# Patient Record
Sex: FEMALE | NOT HISPANIC OR LATINO | Employment: FULL TIME | URBAN - METROPOLITAN AREA
[De-identification: names, ages, dates, MRNs, and addresses within clinical notes are randomized per-mention and may not be internally consistent; named-entity substitution may affect disease eponyms.]

---

## 2023-03-23 DIAGNOSIS — Z79.4 TYPE 2 DIABETES MELLITUS WITH HYPERGLYCEMIA, WITH LONG-TERM CURRENT USE OF INSULIN (MULTI): Primary | ICD-10-CM

## 2023-03-23 DIAGNOSIS — E11.65 TYPE 2 DIABETES MELLITUS WITH HYPERGLYCEMIA, WITH LONG-TERM CURRENT USE OF INSULIN (MULTI): Primary | ICD-10-CM

## 2023-03-23 DIAGNOSIS — I10 PRIMARY HYPERTENSION: ICD-10-CM

## 2023-03-27 PROBLEM — E10.9 DIABETES TYPE 1, CONTROLLED (MULTI): Status: ACTIVE | Noted: 2023-03-27

## 2023-03-27 PROBLEM — M48.02 STENOSIS OF CERVICAL SPINE WITH MYELOPATHY (MULTI): Status: ACTIVE | Noted: 2023-03-27

## 2023-03-27 PROBLEM — B00.9 HSV-1 (HERPES SIMPLEX VIRUS 1) INFECTION: Status: ACTIVE | Noted: 2023-03-27

## 2023-03-27 PROBLEM — S32.000A LUMBAR COMPRESSION FRACTURE (MULTI): Status: ACTIVE | Noted: 2023-03-27

## 2023-03-27 PROBLEM — J40 BRONCHITIS: Status: RESOLVED | Noted: 2023-03-27 | Resolved: 2023-03-27

## 2023-03-27 PROBLEM — B34.9 VIRAL ILLNESS: Status: RESOLVED | Noted: 2023-03-27 | Resolved: 2023-03-27

## 2023-03-27 PROBLEM — E78.00 HIGH CHOLESTEROL: Status: ACTIVE | Noted: 2023-03-27

## 2023-03-27 PROBLEM — H91.92 HEARING LOSS, LEFT: Status: ACTIVE | Noted: 2023-03-27

## 2023-03-27 PROBLEM — U07.1 COVID-19 VIRUS INFECTION: Status: RESOLVED | Noted: 2023-03-27 | Resolved: 2023-03-27

## 2023-03-27 PROBLEM — R12 HEARTBURN: Status: ACTIVE | Noted: 2023-03-27

## 2023-03-27 PROBLEM — L08.9 TOE INFECTION: Status: ACTIVE | Noted: 2023-03-27

## 2023-03-27 PROBLEM — E10.29: Status: ACTIVE | Noted: 2023-03-27

## 2023-03-27 PROBLEM — B02.9 HERPES ZOSTER: Status: ACTIVE | Noted: 2023-03-27

## 2023-03-27 PROBLEM — R80.9: Status: ACTIVE | Noted: 2023-03-27

## 2023-03-27 PROBLEM — M54.14 THORACIC RADICULITIS: Status: ACTIVE | Noted: 2023-03-27

## 2023-03-27 PROBLEM — G99.2 STENOSIS OF CERVICAL SPINE WITH MYELOPATHY (MULTI): Status: ACTIVE | Noted: 2023-03-27

## 2023-03-27 PROBLEM — M54.12 CERVICAL RADICULOPATHY: Status: ACTIVE | Noted: 2023-03-27

## 2023-03-27 PROBLEM — I10 BENIGN ESSENTIAL HYPERTENSION: Status: ACTIVE | Noted: 2023-03-27

## 2023-03-27 PROBLEM — M25.50 ARTHRALGIA OF MULTIPLE SITES: Status: ACTIVE | Noted: 2023-03-27

## 2023-03-27 PROBLEM — M48.062 LUMBAR STENOSIS WITH NEUROGENIC CLAUDICATION: Status: ACTIVE | Noted: 2023-03-27

## 2023-03-27 PROBLEM — M79.7 FIBROMYALGIA: Status: ACTIVE | Noted: 2023-03-27

## 2023-03-27 PROBLEM — H90.3 BILATERAL HIGH FREQUENCY SENSORINEURAL HEARING LOSS: Status: ACTIVE | Noted: 2023-03-27

## 2023-03-27 PROBLEM — Z20.822 CLOSE EXPOSURE TO COVID-19 VIRUS: Status: RESOLVED | Noted: 2023-03-27 | Resolved: 2023-03-27

## 2023-03-27 PROBLEM — R10.11 COLICKY RUQ ABDOMINAL PAIN: Status: ACTIVE | Noted: 2023-03-27

## 2023-03-27 PROBLEM — R07.81 RIB PAIN: Status: ACTIVE | Noted: 2023-03-27

## 2023-03-27 PROBLEM — J06.9 ACUTE URI: Status: RESOLVED | Noted: 2023-03-27 | Resolved: 2023-03-27

## 2023-03-27 PROBLEM — E11.21 MICROALBUMINURIC DIABETIC NEPHROPATHY (MULTI): Status: ACTIVE | Noted: 2023-03-27

## 2023-03-27 PROBLEM — M54.6 THORACIC BACK PAIN: Status: ACTIVE | Noted: 2023-03-27

## 2023-03-27 PROBLEM — I25.10 ARTERIOSCLEROTIC CORONARY ARTERY DISEASE: Status: ACTIVE | Noted: 2023-03-27

## 2023-03-27 PROBLEM — J45.909 REACTIVE AIRWAY DISEASE WITH WHEEZING (HHS-HCC): Status: ACTIVE | Noted: 2023-03-27

## 2023-03-27 PROBLEM — E06.3 HASHIMOTO'S THYROIDITIS: Status: ACTIVE | Noted: 2023-03-27

## 2023-03-27 RX ORDER — ATENOLOL 25 MG/1
25 TABLET ORAL DAILY
Qty: 90 TABLET | Refills: 3 | Status: SHIPPED | OUTPATIENT
Start: 2023-03-27 | End: 2024-01-18 | Stop reason: SDUPTHER

## 2023-03-27 RX ORDER — FLASH GLUCOSE SENSOR
KIT MISCELLANEOUS
COMMUNITY
Start: 2022-10-19 | End: 2023-07-24

## 2023-03-27 RX ORDER — LEVOTHYROXINE SODIUM 50 UG/1
50 TABLET ORAL DAILY
COMMUNITY
End: 2024-04-23 | Stop reason: SDUPTHER

## 2023-03-27 RX ORDER — INSULIN LISPRO 100 [IU]/ML
INJECTION, SOLUTION INTRAVENOUS; SUBCUTANEOUS
Qty: 90 ML | Refills: 3 | Status: SHIPPED | OUTPATIENT
Start: 2023-03-27

## 2023-03-27 RX ORDER — ATENOLOL 25 MG/1
1 TABLET ORAL DAILY
COMMUNITY
Start: 2015-03-03 | End: 2023-03-27 | Stop reason: SDUPTHER

## 2023-03-27 RX ORDER — INSULIN LISPRO 100 [IU]/ML
INJECTION, SOLUTION INTRAVENOUS; SUBCUTANEOUS
COMMUNITY
Start: 2013-07-08 | End: 2023-03-27 | Stop reason: SDUPTHER

## 2023-03-27 RX ORDER — INSULIN GLARGINE-YFGN 100 [IU]/ML
INJECTION, SOLUTION SUBCUTANEOUS
COMMUNITY
Start: 2022-03-02

## 2023-03-27 RX ORDER — DULOXETIN HYDROCHLORIDE 30 MG/1
1 CAPSULE, DELAYED RELEASE ORAL NIGHTLY
COMMUNITY
Start: 2020-06-25 | End: 2023-10-23

## 2023-03-27 RX ORDER — NITROGLYCERIN 0.4 MG/1
TABLET SUBLINGUAL
COMMUNITY
Start: 2014-10-10 | End: 2024-05-23 | Stop reason: SDUPTHER

## 2023-03-27 RX ORDER — ACYCLOVIR 800 MG/1
TABLET ORAL
COMMUNITY
Start: 2023-02-24 | End: 2023-05-08 | Stop reason: SDUPTHER

## 2023-03-27 RX ORDER — INSULIN LISPRO 100 [IU]/ML
INJECTION, SOLUTION INTRAVENOUS; SUBCUTANEOUS
Qty: 45 ML | Refills: 0 | Status: SHIPPED | OUTPATIENT
Start: 2023-03-27 | End: 2023-10-18 | Stop reason: SDUPTHER

## 2023-03-27 RX ORDER — LOVASTATIN 20 MG/1
2 TABLET ORAL DAILY
COMMUNITY
Start: 2019-05-21 | End: 2024-02-05 | Stop reason: SDUPTHER

## 2023-03-27 RX ORDER — ASPIRIN 325 MG
1 TABLET ORAL DAILY
COMMUNITY
Start: 2014-02-21

## 2023-03-27 RX ORDER — LISINOPRIL 20 MG/1
2 TABLET ORAL DAILY
COMMUNITY
Start: 2014-10-29 | End: 2023-10-16

## 2023-03-27 RX ORDER — INSULIN LISPRO 100 [IU]/ML
INJECTION, SOLUTION INTRAVENOUS; SUBCUTANEOUS
COMMUNITY
Start: 2022-08-22 | End: 2023-03-27 | Stop reason: SDUPTHER

## 2023-03-27 NOTE — TELEPHONE ENCOUNTER
Patient called about this , says she only has 3 left     Humalog [facility] also a 3 month supply if you can

## 2023-04-25 ENCOUNTER — TELEPHONE (OUTPATIENT)
Dept: PRIMARY CARE | Facility: CLINIC | Age: 63
End: 2023-04-25

## 2023-04-25 DIAGNOSIS — M25.531 WRIST PAIN, ACUTE, RIGHT: Primary | ICD-10-CM

## 2023-04-25 NOTE — TELEPHONE ENCOUNTER
Patient is hospital employee, she is asking for a wrist xray of the right side below thumb     Please order asap

## 2023-05-03 NOTE — RESULT ENCOUNTER NOTE
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 Could see hand surgeon (Dr. Eddy) for injection, if interested.

## 2023-05-05 ENCOUNTER — TELEPHONE (OUTPATIENT)
Dept: PRIMARY CARE | Facility: CLINIC | Age: 63
End: 2023-05-05

## 2023-05-05 DIAGNOSIS — B02.9 HERPES ZOSTER WITHOUT COMPLICATION: ICD-10-CM

## 2023-05-05 DIAGNOSIS — M54.14 THORACIC RADICULITIS: Primary | ICD-10-CM

## 2023-05-05 NOTE — TELEPHONE ENCOUNTER
Patient called asking for a script of acyclovir for her internal shingles. Ramez Patel    Also looking for results of her wrist xray

## 2023-05-08 RX ORDER — ACYCLOVIR 800 MG/1
800 TABLET ORAL
Qty: 35 TABLET | Refills: 1 | Status: SHIPPED | OUTPATIENT
Start: 2023-05-08 | End: 2023-05-08 | Stop reason: SDUPTHER

## 2023-05-08 RX ORDER — ACYCLOVIR 800 MG/1
800 TABLET ORAL
Qty: 35 TABLET | Refills: 1 | Status: SHIPPED | OUTPATIENT
Start: 2023-05-08 | End: 2023-05-15

## 2023-07-17 ENCOUNTER — TELEPHONE (OUTPATIENT)
Dept: PRIMARY CARE | Facility: CLINIC | Age: 63
End: 2023-07-17

## 2023-07-17 NOTE — TELEPHONE ENCOUNTER
Patient called again, asking for an Xray if that would be easier. If you order the MRI- she wants it to be ordered STAT

## 2023-07-17 NOTE — TELEPHONE ENCOUNTER
Patient thinks she tore her rotator cuff. She states she has limited motion in her left arm. She thinks she injured in around 7/8/2023. Wants to know if she should get an MRI. Pain scale 10.

## 2023-10-18 DIAGNOSIS — E11.65 TYPE 2 DIABETES MELLITUS WITH HYPERGLYCEMIA, WITH LONG-TERM CURRENT USE OF INSULIN (MULTI): ICD-10-CM

## 2023-10-18 DIAGNOSIS — Z79.4 TYPE 2 DIABETES MELLITUS WITH HYPERGLYCEMIA, WITH LONG-TERM CURRENT USE OF INSULIN (MULTI): ICD-10-CM

## 2023-10-18 RX ORDER — INSULIN LISPRO 100 [IU]/ML
INJECTION, SOLUTION INTRAVENOUS; SUBCUTANEOUS
Qty: 45 ML | Refills: 3 | Status: SHIPPED | OUTPATIENT
Start: 2023-10-18 | End: 2024-02-05 | Stop reason: WASHOUT

## 2023-10-23 DIAGNOSIS — M54.12 CERVICAL RADICULOPATHY: Primary | ICD-10-CM

## 2023-10-23 DIAGNOSIS — B00.9 HSV-1 (HERPES SIMPLEX VIRUS 1) INFECTION: ICD-10-CM

## 2023-10-23 RX ORDER — DULOXETIN HYDROCHLORIDE 30 MG/1
30 CAPSULE, DELAYED RELEASE ORAL NIGHTLY
Qty: 90 CAPSULE | Refills: 0 | Status: SHIPPED
Start: 2023-10-23 | End: 2024-05-23 | Stop reason: ALTCHOICE

## 2023-11-01 RX ORDER — ACYCLOVIR 400 MG/1
400 TABLET ORAL 2 TIMES DAILY
Qty: 180 TABLET | Refills: 3 | Status: SHIPPED | OUTPATIENT
Start: 2023-11-01 | End: 2024-10-31

## 2023-11-13 ENCOUNTER — APPOINTMENT (OUTPATIENT)
Dept: ENDOCRINOLOGY | Facility: CLINIC | Age: 63
End: 2023-11-13

## 2023-11-24 DIAGNOSIS — E10.9 CONTROLLED DIABETES MELLITUS TYPE 1 WITHOUT COMPLICATIONS (MULTI): ICD-10-CM

## 2023-11-27 ENCOUNTER — TELEPHONE (OUTPATIENT)
Dept: PRIMARY CARE | Facility: CLINIC | Age: 63
End: 2023-11-27

## 2023-11-27 DIAGNOSIS — E10.9 CONTROLLED DIABETES MELLITUS TYPE 1 WITHOUT COMPLICATIONS (MULTI): ICD-10-CM

## 2023-11-27 RX ORDER — FLASH GLUCOSE SENSOR
KIT MISCELLANEOUS
Qty: 6 EACH | Refills: 3 | OUTPATIENT
Start: 2023-11-27

## 2023-11-27 RX ORDER — FLASH GLUCOSE SENSOR
KIT MISCELLANEOUS
Qty: 6 EACH | Refills: 0 | Status: SHIPPED | OUTPATIENT
Start: 2023-11-27 | End: 2024-01-25 | Stop reason: SDUPTHER

## 2023-11-27 NOTE — TELEPHONE ENCOUNTER
Rx Refill Request Telephone Encounter    Name:  Jeanine GISELLE Ghosh  :  [id number]  Medication Name:  FreeStyle Mando 2         3 months supply    Specific Pharmacy location:  [facility]  **Patients insurance will be changing end of month and would like a 3 month supply sent to Pharmacy

## 2024-01-18 ENCOUNTER — TELEPHONE (OUTPATIENT)
Dept: PRIMARY CARE | Facility: CLINIC | Age: 64
End: 2024-01-18
Payer: COMMERCIAL

## 2024-01-18 DIAGNOSIS — I10 PRIMARY HYPERTENSION: ICD-10-CM

## 2024-01-18 RX ORDER — ATENOLOL 25 MG/1
25 TABLET ORAL DAILY
Qty: 90 TABLET | Refills: 0 | Status: SHIPPED | OUTPATIENT
Start: 2024-01-18 | End: 2024-02-05 | Stop reason: WASHOUT

## 2024-01-18 NOTE — TELEPHONE ENCOUNTER
Med Refill   Atenolol 25mg     [facility] pharmacy     3 month supply

## 2024-01-22 ENCOUNTER — TELEPHONE (OUTPATIENT)
Dept: PRIMARY CARE | Facility: CLINIC | Age: 64
End: 2024-01-22
Payer: COMMERCIAL

## 2024-01-22 DIAGNOSIS — E10.9 CONTROLLED DIABETES MELLITUS TYPE 1 WITHOUT COMPLICATIONS (MULTI): ICD-10-CM

## 2024-01-22 NOTE — TELEPHONE ENCOUNTER
Refill on Free  Style Mando sensor system   90 day supply     #6  to [facility] pharmacy [place]

## 2024-01-25 DIAGNOSIS — E10.9 CONTROLLED DIABETES MELLITUS TYPE 1 WITHOUT COMPLICATIONS (MULTI): ICD-10-CM

## 2024-01-25 RX ORDER — FLASH GLUCOSE SENSOR
KIT MISCELLANEOUS
Qty: 6 EACH | Refills: 3 | Status: SHIPPED | OUTPATIENT
Start: 2024-01-25

## 2024-01-30 ENCOUNTER — TELEPHONE (OUTPATIENT)
Dept: ENDOCRINOLOGY | Facility: CLINIC | Age: 64
End: 2024-01-30
Payer: COMMERCIAL

## 2024-01-30 DIAGNOSIS — E10.9 TYPE 1 DIABETES MELLITUS WITHOUT COMPLICATION (MULTI): Primary | ICD-10-CM

## 2024-02-01 NOTE — TELEPHONE ENCOUNTER
Patient called to check on the status of the request.  She states that she will be out of medication soon and would like it sent in ASAP.

## 2024-02-02 RX ORDER — FLASH GLUCOSE SENSOR
KIT MISCELLANEOUS
Qty: 6 EACH | Refills: 0 | Status: SHIPPED | OUTPATIENT
Start: 2024-02-02 | End: 2024-02-08 | Stop reason: WASHOUT

## 2024-02-05 ENCOUNTER — OFFICE VISIT (OUTPATIENT)
Dept: PRIMARY CARE | Facility: CLINIC | Age: 64
End: 2024-02-05
Payer: COMMERCIAL

## 2024-02-05 ENCOUNTER — LAB (OUTPATIENT)
Dept: LAB | Facility: LAB | Age: 64
End: 2024-02-05
Payer: COMMERCIAL

## 2024-02-05 VITALS
BODY MASS INDEX: 28.52 KG/M2 | DIASTOLIC BLOOD PRESSURE: 80 MMHG | RESPIRATION RATE: 16 BRPM | SYSTOLIC BLOOD PRESSURE: 132 MMHG | WEIGHT: 155 LBS | HEIGHT: 62 IN | OXYGEN SATURATION: 98 % | HEART RATE: 66 BPM

## 2024-02-05 DIAGNOSIS — Z78.0 MENOPAUSE: ICD-10-CM

## 2024-02-05 DIAGNOSIS — G99.2 STENOSIS OF CERVICAL SPINE WITH MYELOPATHY (MULTI): ICD-10-CM

## 2024-02-05 DIAGNOSIS — I10 BENIGN ESSENTIAL HYPERTENSION: ICD-10-CM

## 2024-02-05 DIAGNOSIS — M79.7 FIBROMYALGIA: ICD-10-CM

## 2024-02-05 DIAGNOSIS — N18.31 STAGE 3A CHRONIC KIDNEY DISEASE (MULTI): ICD-10-CM

## 2024-02-05 DIAGNOSIS — Z12.11 COLON CANCER SCREENING: ICD-10-CM

## 2024-02-05 DIAGNOSIS — E10.9 CONTROLLED DIABETES MELLITUS TYPE 1 WITHOUT COMPLICATIONS (MULTI): ICD-10-CM

## 2024-02-05 DIAGNOSIS — E06.3 HASHIMOTO'S THYROIDITIS: ICD-10-CM

## 2024-02-05 DIAGNOSIS — J45.20 MILD INTERMITTENT REACTIVE AIRWAY DISEASE WITH WHEEZING WITHOUT COMPLICATION (HHS-HCC): ICD-10-CM

## 2024-02-05 DIAGNOSIS — Z97.4 HEARING AID WORN: ICD-10-CM

## 2024-02-05 DIAGNOSIS — I10 BENIGN ESSENTIAL HYPERTENSION: Primary | ICD-10-CM

## 2024-02-05 DIAGNOSIS — B00.9 HSV-1 (HERPES SIMPLEX VIRUS 1) INFECTION: ICD-10-CM

## 2024-02-05 DIAGNOSIS — M25.50 ARTHRALGIA OF MULTIPLE SITES: ICD-10-CM

## 2024-02-05 DIAGNOSIS — Z12.31 VISIT FOR SCREENING MAMMOGRAM: ICD-10-CM

## 2024-02-05 DIAGNOSIS — R80.9 TYPE 1 DIABETES MELLITUS WITH MICROALBUMINURIA, WITH LONG-TERM CURRENT USE OF INSULIN (MULTI): ICD-10-CM

## 2024-02-05 DIAGNOSIS — E11.21 MICROALBUMINURIC DIABETIC NEPHROPATHY (MULTI): ICD-10-CM

## 2024-02-05 DIAGNOSIS — M48.02 STENOSIS OF CERVICAL SPINE WITH MYELOPATHY (MULTI): ICD-10-CM

## 2024-02-05 DIAGNOSIS — E78.2 MIXED HYPERLIPIDEMIA: ICD-10-CM

## 2024-02-05 DIAGNOSIS — Z00.00 ANNUAL PHYSICAL EXAM: ICD-10-CM

## 2024-02-05 DIAGNOSIS — E10.29 TYPE 1 DIABETES MELLITUS WITH MICROALBUMINURIA, WITH LONG-TERM CURRENT USE OF INSULIN (MULTI): ICD-10-CM

## 2024-02-05 LAB
ALBUMIN SERPL BCP-MCNC: 3.6 G/DL (ref 3.4–5)
ALP SERPL-CCNC: 62 U/L (ref 33–136)
ALT SERPL W P-5'-P-CCNC: 22 U/L (ref 7–45)
ANION GAP SERPL CALC-SCNC: 10 MMOL/L (ref 10–20)
AST SERPL W P-5'-P-CCNC: 22 U/L (ref 9–39)
BASOPHILS # BLD AUTO: 0.05 X10*3/UL (ref 0–0.1)
BASOPHILS NFR BLD AUTO: 0.8 %
BILIRUB SERPL-MCNC: 0.5 MG/DL (ref 0–1.2)
BUN SERPL-MCNC: 25 MG/DL (ref 6–23)
CALCIUM SERPL-MCNC: 8.7 MG/DL (ref 8.6–10.3)
CHLORIDE SERPL-SCNC: 104 MMOL/L (ref 98–107)
CHOLEST SERPL-MCNC: 177 MG/DL (ref 0–199)
CHOLESTEROL/HDL RATIO: 2.6
CO2 SERPL-SCNC: 30 MMOL/L (ref 21–32)
CREAT SERPL-MCNC: 1.26 MG/DL (ref 0.5–1.05)
CREAT UR-MCNC: 102.3 MG/DL (ref 20–320)
EGFRCR SERPLBLD CKD-EPI 2021: 48 ML/MIN/1.73M*2
EOSINOPHIL # BLD AUTO: 0.36 X10*3/UL (ref 0–0.7)
EOSINOPHIL NFR BLD AUTO: 5.5 %
ERYTHROCYTE [DISTWIDTH] IN BLOOD BY AUTOMATED COUNT: 12.9 % (ref 11.5–14.5)
EST. AVERAGE GLUCOSE BLD GHB EST-MCNC: 214 MG/DL
GLUCOSE SERPL-MCNC: 214 MG/DL (ref 74–99)
HBA1C MFR BLD: 9.1 %
HCT VFR BLD AUTO: 42 % (ref 36–46)
HDLC SERPL-MCNC: 68.4 MG/DL
HGB BLD-MCNC: 13.6 G/DL (ref 12–16)
IMM GRANULOCYTES # BLD AUTO: 0.02 X10*3/UL (ref 0–0.7)
IMM GRANULOCYTES NFR BLD AUTO: 0.3 % (ref 0–0.9)
LDLC SERPL CALC-MCNC: 93 MG/DL
LYMPHOCYTES # BLD AUTO: 1.79 X10*3/UL (ref 1.2–4.8)
LYMPHOCYTES NFR BLD AUTO: 27.2 %
MCH RBC QN AUTO: 32.4 PG (ref 26–34)
MCHC RBC AUTO-ENTMCNC: 32.4 G/DL (ref 32–36)
MCV RBC AUTO: 100 FL (ref 80–100)
MICROALBUMIN UR-MCNC: 844 MG/L
MICROALBUMIN/CREAT UR: 825 UG/MG CREAT
MONOCYTES # BLD AUTO: 0.64 X10*3/UL (ref 0.1–1)
MONOCYTES NFR BLD AUTO: 9.7 %
NEUTROPHILS # BLD AUTO: 3.73 X10*3/UL (ref 1.2–7.7)
NEUTROPHILS NFR BLD AUTO: 56.5 %
NON HDL CHOLESTEROL: 109 MG/DL (ref 0–149)
NRBC BLD-RTO: 0 /100 WBCS (ref 0–0)
PLATELET # BLD AUTO: 315 X10*3/UL (ref 150–450)
POTASSIUM SERPL-SCNC: 5 MMOL/L (ref 3.5–5.3)
PROT SERPL-MCNC: 6.1 G/DL (ref 6.4–8.2)
RBC # BLD AUTO: 4.2 X10*6/UL (ref 4–5.2)
SODIUM SERPL-SCNC: 139 MMOL/L (ref 136–145)
T4 FREE SERPL-MCNC: 0.94 NG/DL (ref 0.61–1.12)
TRIGL SERPL-MCNC: 78 MG/DL (ref 0–149)
TSH SERPL-ACNC: 5.31 MIU/L (ref 0.44–3.98)
VLDL: 16 MG/DL (ref 0–40)
WBC # BLD AUTO: 6.6 X10*3/UL (ref 4.4–11.3)

## 2024-02-05 PROCEDURE — 36415 COLL VENOUS BLD VENIPUNCTURE: CPT

## 2024-02-05 PROCEDURE — 83036 HEMOGLOBIN GLYCOSYLATED A1C: CPT

## 2024-02-05 PROCEDURE — 3075F SYST BP GE 130 - 139MM HG: CPT | Performed by: FAMILY MEDICINE

## 2024-02-05 PROCEDURE — 85025 COMPLETE CBC W/AUTO DIFF WBC: CPT

## 2024-02-05 PROCEDURE — 84439 ASSAY OF FREE THYROXINE: CPT

## 2024-02-05 PROCEDURE — 1036F TOBACCO NON-USER: CPT | Performed by: FAMILY MEDICINE

## 2024-02-05 PROCEDURE — 99396 PREV VISIT EST AGE 40-64: CPT | Performed by: FAMILY MEDICINE

## 2024-02-05 PROCEDURE — 80053 COMPREHEN METABOLIC PANEL: CPT

## 2024-02-05 PROCEDURE — 82570 ASSAY OF URINE CREATININE: CPT

## 2024-02-05 PROCEDURE — 4010F ACE/ARB THERAPY RXD/TAKEN: CPT | Performed by: FAMILY MEDICINE

## 2024-02-05 PROCEDURE — 82043 UR ALBUMIN QUANTITATIVE: CPT

## 2024-02-05 PROCEDURE — 3066F NEPHROPATHY DOC TX: CPT | Performed by: FAMILY MEDICINE

## 2024-02-05 PROCEDURE — 3079F DIAST BP 80-89 MM HG: CPT | Performed by: FAMILY MEDICINE

## 2024-02-05 PROCEDURE — 80061 LIPID PANEL: CPT

## 2024-02-05 PROCEDURE — 84443 ASSAY THYROID STIM HORMONE: CPT

## 2024-02-05 RX ORDER — EZETIMIBE 10 MG/1
10 TABLET ORAL DAILY
Qty: 90 TABLET | Refills: 3 | Status: SHIPPED | OUTPATIENT
Start: 2024-02-05 | End: 2025-02-04

## 2024-02-05 ASSESSMENT — PATIENT HEALTH QUESTIONNAIRE - PHQ9
1. LITTLE INTEREST OR PLEASURE IN DOING THINGS: NOT AT ALL
2. FEELING DOWN, DEPRESSED OR HOPELESS: NOT AT ALL
SUM OF ALL RESPONSES TO PHQ9 QUESTIONS 1 AND 2: 0

## 2024-02-05 ASSESSMENT — ENCOUNTER SYMPTOMS
OCCASIONAL FEELINGS OF UNSTEADINESS: 0
DEPRESSION: 0
LOSS OF SENSATION IN FEET: 0

## 2024-02-05 ASSESSMENT — COLUMBIA-SUICIDE SEVERITY RATING SCALE - C-SSRS
2. HAVE YOU ACTUALLY HAD ANY THOUGHTS OF KILLING YOURSELF?: NO
1. IN THE PAST MONTH, HAVE YOU WISHED YOU WERE DEAD OR WISHED YOU COULD GO TO SLEEP AND NOT WAKE UP?: NO
6. HAVE YOU EVER DONE ANYTHING, STARTED TO DO ANYTHING, OR PREPARED TO DO ANYTHING TO END YOUR LIFE?: NO

## 2024-02-05 NOTE — PROGRESS NOTES
Subjective   Jeanine Ghosh is a 63 y.o. female and is here for a comprehensive physical exam. The patient reports no problems. She plans to see Dr. Bagley this week, was on Semglee and Lispro.   Last physical:   Changes no  Concerns no  Dentist no  Vision yes  Hearing Problems yes, wears hearing aids.   Diet yes  Exercise no  Alcohol no  Drugs no  Social History     Tobacco Use   Smoking Status Not on file   Smokeless Tobacco Not on file          Do you take any herbs or supplements that were not prescribed by a doctor? yes Vitamin D, Magnesium   Are you taking calcium supplements? no  Are you taking aspirin daily? yes 325      History:  LMP: No LMP recorded.  Menopause at 55 years  Last pap date: unsure  Abnormal pap? no  : 2  Para: 2    Do you have pain that bothers you in your daily life? yes Peripheral neuropathy.   Review of Systems   All other systems reviewed and are negative.     Weight up ten pounds   Objective   Physical Exam  Vitals reviewed.   Constitutional:       Appearance: Normal appearance.   HENT:      Head: Normocephalic and atraumatic.      Right Ear: Tympanic membrane normal.      Left Ear: Tympanic membrane normal.      Nose: Nose normal.      Mouth/Throat:      Mouth: Mucous membranes are moist.      Pharynx: Oropharynx is clear.   Eyes:      Extraocular Movements: Extraocular movements intact.      Conjunctiva/sclera: Conjunctivae normal.      Pupils: Pupils are equal, round, and reactive to light.   Cardiovascular:      Rate and Rhythm: Normal rate and regular rhythm.      Pulses: Normal pulses.      Heart sounds: Normal heart sounds.   Pulmonary:      Effort: Pulmonary effort is normal.      Breath sounds: Normal breath sounds.   Abdominal:      General: Abdomen is flat. Bowel sounds are normal.      Palpations: Abdomen is soft.   Musculoskeletal:         General: Normal range of motion.      Cervical back: Normal range of motion and neck supple.   Skin:     General: Skin is  warm and dry.      Capillary Refill: Capillary refill takes less than 2 seconds.   Neurological:      General: No focal deficit present.      Mental Status: She is alert and oriented to person, place, and time.   Psychiatric:         Mood and Affect: Mood normal.         Behavior: Behavior normal.         Assessment/Plan   Healthy female exam. She is to see endocrinology soon and will adjust insulin.      1. Needs Pap, Colonoscopy, DEXA, mammogram  2. Patient Counseling:  --Nutrition: Stressed importance of moderation in sodium/caffeine intake, saturated fat and cholesterol, caloric balance, sufficient intake of fresh fruits, vegetables, fiber, calcium, iron, and 1 mg of folate supplement per day (for females capable of pregnancy).  --Discussed the issue of estrogen replacement, calcium supplement, and the daily use of baby aspirin.  --Exercise: Stressed the importance of regular exercise.   --Substance Abuse: Discussed cessation/primary prevention of tobacco, alcohol, or other drug use; driving or other dangerous activities under the influence; availability of treatment for abuse.    --Sexuality: Discussed sexually transmitted diseases, partner selection, use of condoms, avoidance of unintended pregnancy  and contraceptive alternatives.   --Injury prevention: Discussed safety belts, safety helmets, smoke detector, smoking near bedding or upholstery.   --Dental health: Discussed importance of regular tooth brushing, flossing, and dental visits.  --Immunizations reviewed.  --Discussed benefits of screening colonoscopy.  --After hours service discussed with patient  3. Discussed the patient's BMI with her.  The BMI is above average. The patient received discussion because they have an above normal BMI.  4. Follow up in one year

## 2024-02-05 NOTE — PATIENT INSTRUCTIONS
Assessment/Plan   Healthy female exam. She is to see endocrinology soon and will adjust insulin.      1. Needs Pap, Colonoscopy, DEXA, mammogram  2. Patient Counseling:  --Nutrition: Stressed importance of moderation in sodium/caffeine intake, saturated fat and cholesterol, caloric balance, sufficient intake of fresh fruits, vegetables, fiber, calcium, iron, and 1 mg of folate supplement per day (for females capable of pregnancy).  --Discussed the issue of estrogen replacement, calcium supplement, and the daily use of baby aspirin.  --Exercise: Stressed the importance of regular exercise.   --Substance Abuse: Discussed cessation/primary prevention of tobacco, alcohol, or other drug use; driving or other dangerous activities under the influence; availability of treatment for abuse.    --Sexuality: Discussed sexually transmitted diseases, partner selection, use of condoms, avoidance of unintended pregnancy  and contraceptive alternatives.   --Injury prevention: Discussed safety belts, safety helmets, smoke detector, smoking near bedding or upholstery.   --Dental health: Discussed importance of regular tooth brushing, flossing, and dental visits.  --Immunizations reviewed.  --Discussed benefits of screening colonoscopy.  --After hours service discussed with patient  3. Discussed the patient's BMI with her.  The BMI is above average. The patient received discussion because they have an above normal BMI.  4. Follow up in one year    I ordered labwork, colonoscopy, mammogram, DEXA, and would schedule pap with gynecology.

## 2024-02-06 RX ORDER — INSULIN GLARGINE 100 [IU]/ML
INJECTION, SOLUTION SUBCUTANEOUS
Qty: 15 EACH | Refills: 2 | Status: SHIPPED | OUTPATIENT
Start: 2024-02-06 | End: 2024-02-07 | Stop reason: SDUPTHER

## 2024-02-07 DIAGNOSIS — E10.9 TYPE 1 DIABETES MELLITUS WITHOUT COMPLICATION (MULTI): ICD-10-CM

## 2024-02-07 RX ORDER — INSULIN GLARGINE 100 [IU]/ML
INJECTION, SOLUTION SUBCUTANEOUS
Qty: 15 EACH | Refills: 2 | Status: SHIPPED | OUTPATIENT
Start: 2024-02-07

## 2024-02-07 NOTE — RESULT ENCOUNTER NOTE
Urine albumin was very high meaning your kidneys aren't filtering protein that well. We could increase your lisinopril to help or refer to nephrology for more testing, lso would like to increase thyroid to 75, as you still are low! Let me know!

## 2024-02-08 ENCOUNTER — OFFICE VISIT (OUTPATIENT)
Dept: ENDOCRINOLOGY | Facility: CLINIC | Age: 64
End: 2024-02-08
Payer: COMMERCIAL

## 2024-02-08 VITALS
HEART RATE: 60 BPM | SYSTOLIC BLOOD PRESSURE: 142 MMHG | HEIGHT: 62 IN | DIASTOLIC BLOOD PRESSURE: 68 MMHG | BODY MASS INDEX: 28.16 KG/M2 | WEIGHT: 153 LBS

## 2024-02-08 DIAGNOSIS — E06.3 HASHIMOTO'S THYROIDITIS: Primary | ICD-10-CM

## 2024-02-08 DIAGNOSIS — E10.29 TYPE 1 DIABETES MELLITUS WITH MICROALBUMINURIA, WITH LONG-TERM CURRENT USE OF INSULIN (MULTI): ICD-10-CM

## 2024-02-08 DIAGNOSIS — R80.9 TYPE 1 DIABETES MELLITUS WITH MICROALBUMINURIA, WITH LONG-TERM CURRENT USE OF INSULIN (MULTI): ICD-10-CM

## 2024-02-08 PROCEDURE — 95251 CONT GLUC MNTR ANALYSIS I&R: CPT | Performed by: INTERNAL MEDICINE

## 2024-02-08 PROCEDURE — 3077F SYST BP >= 140 MM HG: CPT | Performed by: INTERNAL MEDICINE

## 2024-02-08 PROCEDURE — 3048F LDL-C <100 MG/DL: CPT | Performed by: INTERNAL MEDICINE

## 2024-02-08 PROCEDURE — 1036F TOBACCO NON-USER: CPT | Performed by: INTERNAL MEDICINE

## 2024-02-08 PROCEDURE — 3078F DIAST BP <80 MM HG: CPT | Performed by: INTERNAL MEDICINE

## 2024-02-08 PROCEDURE — 3062F POS MACROALBUMINURIA REV: CPT | Performed by: INTERNAL MEDICINE

## 2024-02-08 PROCEDURE — 99214 OFFICE O/P EST MOD 30 MIN: CPT | Performed by: INTERNAL MEDICINE

## 2024-02-08 PROCEDURE — 4010F ACE/ARB THERAPY RXD/TAKEN: CPT | Performed by: INTERNAL MEDICINE

## 2024-02-08 PROCEDURE — 3046F HEMOGLOBIN A1C LEVEL >9.0%: CPT | Performed by: INTERNAL MEDICINE

## 2024-02-08 RX ORDER — NITROGLYCERIN 0.4 MG/1
TABLET SUBLINGUAL
Qty: 90 TABLET | Status: CANCELLED | OUTPATIENT
Start: 2024-02-08

## 2024-02-08 NOTE — PATIENT INSTRUCTIONS
Thank you for choosing [facility]  for your health care needs.  If you have any questions, concerns or medical needs, please feel free to contact our office at [phone].    Please ensure you complete your blood work one week before the next scheduled appointment.    To continue Basaglar 12 units subcutaneous daily in the morning   To change Humalog 1 unit for every 13 grams of carb; will switch to Insulin Aspart   Please continue an insulin sliding scale as follows:   150-200mg/dL - 2 units   201-250mg/dL - 3 units   251-300 mg/dL - 4 untis   301-350mg/dL - 5 units   >351mg/dL - 6 units  To continue the use of your CGM for the intensive glucose monitoring due to the dynamic nature of insulin requirements, the narrow therapeutic index of insulin and the potentially fatal consequences of treatment  Record all insulin doses in the [facility]   Use Calorie Johnathan lori for carb counts   Counseled that the time in range should be above 70%  To restart Levothyroxine 50mcg po daily   Take levothyroxine on an empty stomach with water alone, 30-60 minutes before eating or taking other medications, 4 hours before any calcium or iron supplement.  See what the  cost of Synthroid will be   Take levothyroxine on an empty stomach with water alone, 30-60 minutes before eating or taking other medications, 4 hours before any calcium or iron supplement.  For follow up in 4 months

## 2024-02-08 NOTE — PROGRESS NOTES
"Subjective   Jeanine Ghosh is a 63 y.o. female who presents for a follow-up evaluation of Type 1 diabetes mellitus. The initial diagnosis of diabetes was made in 1969 .     Since her last appointment,  Lovastatin was discontinued.  She is now on Zetia.      She stopped levothyroxine as it made her fatigued    Known complications due to diabetes included   CAD s/p 2v CABG in 1997 and chronic kidney disease    Cardiovascular risk factors include diabetes mellitus and microalbuminuria. The patient is on an ACE inhibitor or angiotensin II receptor blocker.  The patient has not been previously hospitalized due to diabetic ketoacidosis.     Current symptoms/problems include none. Her clinical course has been stable.     The patient is currently checking the blood glucose multiple times per day.    Patient is using: continuous glucose monitor                                                                  Hypoglycemia frequency: 2%  Hypoglycemia awareness: Yes      Current Medication Regimen  Semglee 12 units SQ daily in the morning   Humalog 1:15g as she is afriad of hypoglycemia     MEALS:   Breakfast - toast with jelly   Lunch - soup, cereal, hamburger   Dinner - meat, vegetables  Snacks - cheetos, fruit, oatmeal with raisins and brown sugars   Beverages- seltzer water, coke zero, tea, coffee     Denies exercise regimen     Objective   /68 (BP Location: Right arm, Patient Position: Sitting, BP Cuff Size: Adult)   Pulse 60   Ht 1.575 m (5' 2\")   Wt 69.4 kg (153 lb)   BMI 27.98 kg/m²   Physical Exam  Vitals and nursing note reviewed.   Constitutional:       General: She is not in acute distress.     Appearance: Normal appearance. She is normal weight.   HENT:      Head: Normocephalic and atraumatic.      Nose: Nose normal.      Mouth/Throat:      Mouth: Mucous membranes are moist.   Eyes:      Extraocular Movements: Extraocular movements intact.   Pulmonary:      Effort: Pulmonary effort is normal. "   Musculoskeletal:         General: Normal range of motion.   Neurological:      Mental Status: She is alert and oriented to person, place, and time.   Psychiatric:         Mood and Affect: Mood normal.         Lab Review  Lab Results   Component Value Date    HGBA1C 9.1 (H) 02/05/2024     Lab Results   Component Value Date    GLUCOSE 214 (H) 02/05/2024    CALCIUM 8.7 02/05/2024     02/05/2024    K 5.0 02/05/2024    CO2 30 02/05/2024     02/05/2024    BUN 25 (H) 02/05/2024    CREATININE 1.26 (H) 02/05/2024     Lab Results   Component Value Date    CHOL 177 02/05/2024    CHOL 188 07/08/2022    CHOL 201 (H) 12/30/2021     Lab Results   Component Value Date    HDL 68.4 02/05/2024    HDL 71.1 07/08/2022    HDL 78.0 12/30/2021     Lab Results   Component Value Date    LDLCALC 93 02/05/2024     Lab Results   Component Value Date    TRIG 78 02/05/2024    TRIG 77 07/08/2022    TRIG 74 12/30/2021     Lab Results   Component Value Date    TSH 5.31 (H) 02/05/2024    THYROIDPAB 400 (A) 07/08/2022       Health Maintenance:   Foot Exam: July 2023  Eye Exam: June 2022  Urine Albumin: February 5, 2024    Assessment/Plan   63-year-old female presents for follow up for type 1 diabetes mellitus.  Her blood pressure is elevated above goal. She is noted to be on an ACE inhibitor. She is noted to be on a statin.     She was found to have Hashimoto's thyroiditis but discontinued Levothyroxine therapy.      Type 1 diabetes mellitus with microalbuminuria, with long-term current use of insulin (CMS/[facility])  To continue Basaglar 12 units subcutaneous daily in the morning   To change Humalog 1 unit for every 13 grams of carb; will switch to Insulin Aspart   Please continue an insulin sliding scale as follows:   150-200mg/dL - 2 units   201-250mg/dL - 3 units   251-300 mg/dL - 4 untis   301-350mg/dL - 5 units   >351mg/dL - 6 units  To continue the use of your CGM for the intensive glucose monitoring due to the dynamic nature of insulin  requirements, the narrow therapeutic index of insulin and the potentially fatal consequences of treatment  Counseled that the goal A1C should be 7% or less  Counseled glycemic control is warranted to prevent microvascular complications  Counseled that the time in range should be >70%  Record all insulin doses in the Mando   Use Calorie Johnathan lori for carb counts     Hashimoto's thyroiditis  To restart Levothyroxine 50mcg po daily   Take levothyroxine on an empty stomach with water alone, 30-60 minutes before eating or taking other medications, 4 hours before any calcium or iron supplement.  See what the  cost of Synthroid will be     For follow up in 4 months

## 2024-02-14 RX ORDER — INSULIN ASPART 100 [IU]/ML
INJECTION, SOLUTION INTRAVENOUS; SUBCUTANEOUS
Qty: 45 ML | Refills: 2 | Status: SHIPPED | OUTPATIENT
Start: 2024-02-14

## 2024-02-14 NOTE — ASSESSMENT & PLAN NOTE
To continue Basaglar 12 units subcutaneous daily in the morning   To change Humalog 1 unit for every 13 grams of carb; will switch to Insulin Aspart   Please continue an insulin sliding scale as follows:   150-200mg/dL - 2 units   201-250mg/dL - 3 units   251-300 mg/dL - 4 untis   301-350mg/dL - 5 units   >351mg/dL - 6 units  To continue the use of your CGM for the intensive glucose monitoring due to the dynamic nature of insulin requirements, the narrow therapeutic index of insulin and the potentially fatal consequences of treatment  Counseled that the goal A1C should be 7% or less  Counseled glycemic control is warranted to prevent microvascular complications  Counseled that the time in range should be >70%  Record all insulin doses in the Mando   Use Calorie Johnathan lori for carb counts

## 2024-02-14 NOTE — ASSESSMENT & PLAN NOTE
To restart Levothyroxine 50mcg po daily   Take levothyroxine on an empty stomach with water alone, 30-60 minutes before eating or taking other medications, 4 hours before any calcium or iron supplement.  See what the  cost of Synthroid will be     For follow up in 4 months

## 2024-02-16 ENCOUNTER — TELEPHONE (OUTPATIENT)
Dept: PRIMARY CARE | Facility: CLINIC | Age: 64
End: 2024-02-16
Payer: COMMERCIAL

## 2024-02-16 DIAGNOSIS — I10 BENIGN ESSENTIAL HYPERTENSION: ICD-10-CM

## 2024-02-16 NOTE — TELEPHONE ENCOUNTER
Needs a refill on her Lisinopril 20 mg takes it 2 times a day please send to [facility] pharmacy for 90 days.

## 2024-02-19 DIAGNOSIS — I10 BENIGN ESSENTIAL HYPERTENSION: ICD-10-CM

## 2024-02-19 RX ORDER — LISINOPRIL 20 MG/1
40 TABLET ORAL DAILY
Qty: 180 TABLET | Refills: 0 | Status: SHIPPED | OUTPATIENT
Start: 2024-02-19

## 2024-03-07 ENCOUNTER — APPOINTMENT (OUTPATIENT)
Dept: PRIMARY CARE | Facility: CLINIC | Age: 64
End: 2024-03-07
Payer: COMMERCIAL

## 2024-04-11 ENCOUNTER — APPOINTMENT (OUTPATIENT)
Dept: ENDOCRINOLOGY | Facility: CLINIC | Age: 64
End: 2024-04-11
Payer: COMMERCIAL

## 2024-04-23 DIAGNOSIS — E06.3 HASHIMOTO'S THYROIDITIS: ICD-10-CM

## 2024-04-23 DIAGNOSIS — I10 BENIGN ESSENTIAL HYPERTENSION: ICD-10-CM

## 2024-04-23 RX ORDER — LEVOTHYROXINE SODIUM 50 UG/1
50 TABLET ORAL DAILY
Qty: 90 TABLET | Refills: 0 | Status: SHIPPED | OUTPATIENT
Start: 2024-04-23

## 2024-04-23 RX ORDER — ATENOLOL 25 MG/1
25 TABLET ORAL DAILY
Qty: 90 TABLET | Refills: 0 | Status: SHIPPED | OUTPATIENT
Start: 2024-04-23

## 2024-04-23 NOTE — TELEPHONE ENCOUNTER
Med Refill   levothyroxine (Synthroid, Levoxyl) 50 mcg tablet [[id number]]   atenolol (Tenormin) 25 mg tablet [[id number]]     [facility] - [address] [address]  Phone: [phone]  Fax: [phone]

## 2024-05-23 ENCOUNTER — OFFICE VISIT (OUTPATIENT)
Dept: PRIMARY CARE | Facility: CLINIC | Age: 64
End: 2024-05-23
Payer: COMMERCIAL

## 2024-05-23 VITALS
RESPIRATION RATE: 14 BRPM | HEIGHT: 62 IN | DIASTOLIC BLOOD PRESSURE: 82 MMHG | BODY MASS INDEX: 27.31 KG/M2 | HEART RATE: 54 BPM | OXYGEN SATURATION: 94 % | WEIGHT: 148.4 LBS | SYSTOLIC BLOOD PRESSURE: 139 MMHG

## 2024-05-23 DIAGNOSIS — I25.10 ARTERIOSCLEROTIC CORONARY ARTERY DISEASE: ICD-10-CM

## 2024-05-23 DIAGNOSIS — M79.7 FIBROMYALGIA: Primary | ICD-10-CM

## 2024-05-23 DIAGNOSIS — I10 BENIGN ESSENTIAL HYPERTENSION: ICD-10-CM

## 2024-05-23 PROBLEM — Z95.1 S/P CABG X 2: Status: ACTIVE | Noted: 2017-07-05

## 2024-05-23 PROBLEM — J06.9 ACUTE URI: Status: RESOLVED | Noted: 2024-05-23 | Resolved: 2024-05-23

## 2024-05-23 PROBLEM — E78.00 PURE HYPERCHOLESTEROLEMIA: Status: RESOLVED | Noted: 2024-05-23 | Resolved: 2024-05-23

## 2024-05-23 PROBLEM — L08.9 INFECTION OF TOE: Status: RESOLVED | Noted: 2024-05-23 | Resolved: 2024-05-23

## 2024-05-23 PROBLEM — E06.3 CHRONIC LYMPHOCYTIC THYROIDITIS: Status: RESOLVED | Noted: 2024-05-23 | Resolved: 2024-05-23

## 2024-05-23 PROBLEM — S46.112A LABRAL TEAR OF LONG HEAD OF BICEPS TENDON, LEFT, INITIAL ENCOUNTER: Status: ACTIVE | Noted: 2023-08-04

## 2024-05-23 PROBLEM — R12 HEARTBURN: Status: RESOLVED | Noted: 2023-03-27 | Resolved: 2024-05-23

## 2024-05-23 PROBLEM — E78.2 MIXED HYPERLIPIDEMIA: Status: RESOLVED | Noted: 2024-05-23 | Resolved: 2024-05-23

## 2024-05-23 PROBLEM — M19.049 OSTEOARTHRITIS OF HAND: Status: ACTIVE | Noted: 2024-05-23

## 2024-05-23 PROBLEM — F41.9 ANXIETY DISORDER: Status: ACTIVE | Noted: 2024-05-23

## 2024-05-23 PROBLEM — M77.8 TENDINITIS OF SHOULDER: Status: ACTIVE | Noted: 2024-05-23

## 2024-05-23 PROBLEM — H91.92 HEARING LOSS OF LEFT EAR: Status: RESOLVED | Noted: 2024-05-23 | Resolved: 2024-05-23

## 2024-05-23 PROBLEM — E10.649: Status: RESOLVED | Noted: 2018-09-05 | Resolved: 2024-05-23

## 2024-05-23 PROBLEM — B00.9 HERPES SIMPLEX TYPE 1 INFECTION: Status: RESOLVED | Noted: 2024-05-23 | Resolved: 2024-05-23

## 2024-05-23 PROBLEM — J40 BRONCHITIS: Status: RESOLVED | Noted: 2024-05-23 | Resolved: 2024-05-23

## 2024-05-23 PROBLEM — B34.9 VIRAL ILLNESS: Status: RESOLVED | Noted: 2024-05-23 | Resolved: 2024-05-23

## 2024-05-23 PROBLEM — S32.000A COMPRESSION FRACTURE OF LUMBAR VERTEBRA (MULTI): Status: RESOLVED | Noted: 2024-05-23 | Resolved: 2024-05-23

## 2024-05-23 PROBLEM — E66.3 OVERWEIGHT WITH BODY MASS INDEX (BMI) 25.0-29.9: Status: ACTIVE | Noted: 2024-05-23

## 2024-05-23 PROBLEM — H25.099 SENILE INCIPIENT CATARACT: Status: ACTIVE | Noted: 2024-05-23

## 2024-05-23 PROBLEM — U07.1 COVID-19 VIRUS INFECTION: Status: RESOLVED | Noted: 2024-05-23 | Resolved: 2024-05-23

## 2024-05-23 PROBLEM — M67.919 DISORDER OF TENDON OF SHOULDER REGION: Status: ACTIVE | Noted: 2024-05-23

## 2024-05-23 PROBLEM — J45.909 REACTIVE AIRWAY DISEASE (HHS-HCC): Status: RESOLVED | Noted: 2024-05-23 | Resolved: 2024-05-23

## 2024-05-23 PROBLEM — J06.9 ACUTE UPPER RESPIRATORY INFECTION: Status: RESOLVED | Noted: 2024-05-23 | Resolved: 2024-05-23

## 2024-05-23 PROBLEM — Z20.822 EXPOSURE TO SEVERE ACUTE RESPIRATORY SYNDROME CORONAVIRUS 2 (SARS-COV-2): Status: RESOLVED | Noted: 2024-05-23 | Resolved: 2024-05-23

## 2024-05-23 PROBLEM — M25.50 ARTHRALGIA OF MULTIPLE JOINTS: Status: RESOLVED | Noted: 2024-05-23 | Resolved: 2024-05-23

## 2024-05-23 PROBLEM — K63.5 BENIGN COLONIC POLYP: Status: ACTIVE | Noted: 2024-05-23

## 2024-05-23 PROBLEM — L08.9 TOE INFECTION: Status: RESOLVED | Noted: 2023-03-27 | Resolved: 2024-05-23

## 2024-05-23 PROCEDURE — 3079F DIAST BP 80-89 MM HG: CPT

## 2024-05-23 PROCEDURE — 3046F HEMOGLOBIN A1C LEVEL >9.0%: CPT

## 2024-05-23 PROCEDURE — 1036F TOBACCO NON-USER: CPT

## 2024-05-23 PROCEDURE — 3062F POS MACROALBUMINURIA REV: CPT

## 2024-05-23 PROCEDURE — 3075F SYST BP GE 130 - 139MM HG: CPT

## 2024-05-23 PROCEDURE — 99213 OFFICE O/P EST LOW 20 MIN: CPT

## 2024-05-23 PROCEDURE — 3048F LDL-C <100 MG/DL: CPT

## 2024-05-23 PROCEDURE — 4010F ACE/ARB THERAPY RXD/TAKEN: CPT

## 2024-05-23 RX ORDER — DULOXETIN HYDROCHLORIDE 20 MG/1
20 CAPSULE, DELAYED RELEASE ORAL 2 TIMES DAILY
Qty: 60 CAPSULE | Refills: 5 | Status: SHIPPED | OUTPATIENT
Start: 2024-05-23 | End: 2024-11-19

## 2024-05-23 RX ORDER — NITROGLYCERIN 0.4 MG/1
0.4 TABLET SUBLINGUAL EVERY 5 MIN PRN
Qty: 90 TABLET | Refills: 1 | Status: SHIPPED | OUTPATIENT
Start: 2024-05-23

## 2024-05-23 ASSESSMENT — ENCOUNTER SYMPTOMS
HEMATOLOGIC/LYMPHATIC NEGATIVE: 1
CARDIOVASCULAR NEGATIVE: 1
RESPIRATORY NEGATIVE: 1
CONSTITUTIONAL NEGATIVE: 1
PSYCHIATRIC NEGATIVE: 1
ALLERGIC/IMMUNOLOGIC NEGATIVE: 1
EYES NEGATIVE: 1
ABDOMINAL PAIN: 1
NEUROLOGICAL NEGATIVE: 1
ENDOCRINE NEGATIVE: 1
MUSCULOSKELETAL NEGATIVE: 1

## 2024-05-23 ASSESSMENT — PAIN SCALES - GENERAL: PAINLEVEL: 4

## 2024-05-23 NOTE — PROGRESS NOTES
"Subjective   Patient ID: Jeanine Ghosh is a 63 y.o. female who presents for Abdominal Pain (Thinks internal shingles takes acyclovir sometimes now also has malaise, also has spot on her left ear, x 2 month), Nose Problem (Spot on tip), and Edema (After flight to [place] had sob and swelling in BLE).    Past Medical, Surgical, and Family History reviewed and updated in chart.     Reviewed all medications by prescribing practitioner or clinical pharmacist (such as prescriptions, OTCs, herbal therapies and supplements) and documented in the medical record.    Abdominal Pain    Edema    Associated symptoms include abdominal pain.      Patient believes she has Internal shingles a nurse practitioner at one point mentioned the pain she is having in her side is possibly from that. Under right breast skin is very sensitive to touch, at times it feels like someone is stabbing her and it is dull at time. Has numbness or tingling. Patient has history of fibromyalgia, has had testing to right upper quadrant before that were unremarkable. Has not been taking duloxetine daily as prescribed.  States takes her self off of it every so often so her body doesn't get dependent on it.     Review of Systems   Constitutional: Negative.    HENT: Negative.     Eyes: Negative.    Respiratory: Negative.     Cardiovascular: Negative.    Gastrointestinal:  Positive for abdominal pain.   Endocrine: Negative.    Genitourinary: Negative.    Musculoskeletal: Negative.    Skin: Negative.    Allergic/Immunologic: Negative.    Neurological: Negative.    Hematological: Negative.    Psychiatric/Behavioral: Negative.     All other systems reviewed and are negative.      Objective   /82 (BP Location: Right arm, Patient Position: Sitting, BP Cuff Size: Adult)   Pulse 54   Resp 14   Ht 1.575 m (5' 2\")   Wt 67.3 kg (148 lb 6.4 oz)   SpO2 94%   BMI 27.14 kg/m²     Physical Exam  Constitutional:       Appearance: Normal appearance.   HENT:      " Head: Normocephalic and atraumatic.      Nose: Nose normal.      Mouth/Throat:      Mouth: Mucous membranes are moist.      Pharynx: Oropharynx is clear.   Eyes:      Pupils: Pupils are equal, round, and reactive to light.   Cardiovascular:      Rate and Rhythm: Normal rate and regular rhythm.      Pulses: Normal pulses.      Heart sounds: Normal heart sounds.   Pulmonary:      Effort: Pulmonary effort is normal.      Breath sounds: Normal breath sounds.   Abdominal:      General: Bowel sounds are normal.      Palpations: Abdomen is soft.   Musculoskeletal:         General: Normal range of motion.      Cervical back: Normal range of motion.   Skin:     General: Skin is warm and dry.      Comments: Skin sensitive to touch right rib area under right breast.  No redness, swelling noted.  Skin clean dry and intact.    Neurological:      General: No focal deficit present.      Mental Status: She is alert and oriented to person, place, and time.   Psychiatric:         Mood and Affect: Mood normal.         Behavior: Behavior normal.         Thought Content: Thought content normal.         Judgment: Judgment normal.         Assessment/Plan   Problem List Items Addressed This Visit       Arteriosclerotic coronary artery disease    Relevant Medications    nitroglycerin (Nitrostat) 0.4 mg SL tablet    Other Relevant Orders    Referral to Cardiology    Benign essential hypertension    Relevant Medications    nitroglycerin (Nitrostat) 0.4 mg SL tablet    Other Relevant Orders    Referral to Cardiology    Fibromyalgia - Primary    Relevant Medications    DULoxetine (Cymbalta) 20 mg DR capsule        Education given regarding duloxetine and fibromyalgia symptoms.  Agreeable to change dosage of duloxetine to 20mg twice daily and to take as prescribed. Follow up to let us know if this helps.

## 2024-05-23 NOTE — PATIENT INSTRUCTIONS
Education given regarding duloxetine and fibromyalgia symptoms.  Agreeable to change dosage of duloxetine to 20mg twice daily and to take as prescribed. Follow up to let us know if this helps.   Assessment/Plan   Problem List Items Addressed This Visit       Arteriosclerotic coronary artery disease    Relevant Medications    nitroglycerin (Nitrostat) 0.4 mg SL tablet    Other Relevant Orders    Referral to Cardiology    Benign essential hypertension    Relevant Medications    nitroglycerin (Nitrostat) 0.4 mg SL tablet    Other Relevant Orders    Referral to Cardiology    Fibromyalgia - Primary    Relevant Medications    DULoxetine (Cymbalta) 20 mg DR capsule

## 2024-06-10 ENCOUNTER — TELEPHONE (OUTPATIENT)
Dept: ENDOCRINOLOGY | Facility: CLINIC | Age: 64
End: 2024-06-10
Payer: COMMERCIAL

## 2024-06-10 NOTE — TELEPHONE ENCOUNTER
Patient is asking if she need more blood work/ urine test before next appointment, appt scheduled for 6/14/2024

## 2024-06-13 ENCOUNTER — APPOINTMENT (OUTPATIENT)
Dept: PRIMARY CARE | Facility: CLINIC | Age: 64
End: 2024-06-13
Payer: COMMERCIAL

## 2024-06-14 ENCOUNTER — APPOINTMENT (OUTPATIENT)
Dept: ENDOCRINOLOGY | Facility: CLINIC | Age: 64
End: 2024-06-14
Payer: COMMERCIAL

## 2024-06-14 VITALS
BODY MASS INDEX: 27.6 KG/M2 | SYSTOLIC BLOOD PRESSURE: 146 MMHG | HEART RATE: 62 BPM | HEIGHT: 62 IN | DIASTOLIC BLOOD PRESSURE: 68 MMHG | WEIGHT: 150 LBS

## 2024-06-14 DIAGNOSIS — E10.9 TYPE 1 DIABETES MELLITUS WITHOUT COMPLICATION (MULTI): ICD-10-CM

## 2024-06-14 DIAGNOSIS — I10 BENIGN ESSENTIAL HYPERTENSION: ICD-10-CM

## 2024-06-14 DIAGNOSIS — E10.29 TYPE 1 DIABETES MELLITUS WITH MICROALBUMINURIA, WITH LONG-TERM CURRENT USE OF INSULIN (MULTI): Primary | ICD-10-CM

## 2024-06-14 DIAGNOSIS — R80.9 TYPE 1 DIABETES MELLITUS WITH MICROALBUMINURIA, WITH LONG-TERM CURRENT USE OF INSULIN (MULTI): Primary | ICD-10-CM

## 2024-06-14 LAB — POC HEMOGLOBIN A1C: 8.6 % (ref 4.2–6.5)

## 2024-06-14 PROCEDURE — 3062F POS MACROALBUMINURIA REV: CPT | Performed by: INTERNAL MEDICINE

## 2024-06-14 PROCEDURE — 3046F HEMOGLOBIN A1C LEVEL >9.0%: CPT | Performed by: INTERNAL MEDICINE

## 2024-06-14 PROCEDURE — 95251 CONT GLUC MNTR ANALYSIS I&R: CPT | Performed by: INTERNAL MEDICINE

## 2024-06-14 PROCEDURE — 83036 HEMOGLOBIN GLYCOSYLATED A1C: CPT | Performed by: INTERNAL MEDICINE

## 2024-06-14 PROCEDURE — 3077F SYST BP >= 140 MM HG: CPT | Performed by: INTERNAL MEDICINE

## 2024-06-14 PROCEDURE — 4010F ACE/ARB THERAPY RXD/TAKEN: CPT | Performed by: INTERNAL MEDICINE

## 2024-06-14 PROCEDURE — 3048F LDL-C <100 MG/DL: CPT | Performed by: INTERNAL MEDICINE

## 2024-06-14 PROCEDURE — 99214 OFFICE O/P EST MOD 30 MIN: CPT | Performed by: INTERNAL MEDICINE

## 2024-06-14 PROCEDURE — 3078F DIAST BP <80 MM HG: CPT | Performed by: INTERNAL MEDICINE

## 2024-06-14 RX ORDER — ATENOLOL 25 MG/1
25 TABLET ORAL DAILY
Qty: 90 TABLET | Refills: 0 | Status: CANCELLED | OUTPATIENT
Start: 2024-06-14

## 2024-06-14 RX ORDER — INSULIN GLARGINE 100 [IU]/ML
INJECTION, SOLUTION SUBCUTANEOUS
Qty: 15 EACH | Refills: 1 | Status: SHIPPED | OUTPATIENT
Start: 2024-06-14

## 2024-06-14 RX ORDER — LISINOPRIL 40 MG/1
40 TABLET ORAL DAILY
Qty: 90 TABLET | Refills: 1 | Status: SHIPPED | OUTPATIENT
Start: 2024-06-14 | End: 2024-12-11

## 2024-06-14 NOTE — PROGRESS NOTES
"Subjective   Jeanine Ghosh is a 63 y.o. female who presents for a follow-up evaluation of Type 1 diabetes mellitus. The initial diagnosis of diabetes was made in 1969 .     She can have RUQ abdominal pain from what she thinks is from intrenal shingles.    She is on Duloxetine an thedose was recently adjusted     Known complications due to diabetes included   CAD s/p 2v CABG in 1997 and chronic kidney disease    Cardiovascular risk factors include diabetes mellitus and microalbuminuria. The patient is on an ACE inhibitor or angiotensin II receptor blocker.  The patient has not been previously hospitalized due to diabetic ketoacidosis.     Current symptoms/problems include none. Her clinical course has been stable.     The patient is currently checking the blood glucose multiple times per day.    Patient is using: continuous glucose monitor                                                          Hypoglycemia frequency: 5% (increased from 2%)  Hypoglycemia awareness: Yes      Current Medication Regimen  Semglee 12 units SQ daily in the morning   Humalog 1:12g      MEALS:   Breakfast - toast with jelly   Lunch - sandwich   Dinner - omelettes yesterday, peaches and milk , leftovers   Snacks - cheetos, fruit, oatmeal with raisins and brown sugars   Beverages- seltzer water, coke zero, tea, coffee with steveia and regular sugar      Denies exercise regimen     Review of Systems   All other systems reviewed and are negative.    Objective   /68 (BP Location: Left arm, Patient Position: Sitting, BP Cuff Size: Adult)   Pulse 62   Ht 1.568 m (5' 1.75\")   Wt 68 kg (150 lb)   BMI 27.66 kg/m²   Physical Exam  Vitals and nursing note reviewed.   Constitutional:       General: She is not in acute distress.     Appearance: Normal appearance. She is normal weight.   HENT:      Head: Normocephalic and atraumatic.      Nose: Nose normal.      Mouth/Throat:      Mouth: Mucous membranes are moist.   Eyes:      Extraocular " Movements: Extraocular movements intact.   Cardiovascular:      Rate and Rhythm: Normal rate and regular rhythm.   Pulmonary:      Effort: Pulmonary effort is normal.      Breath sounds: Normal breath sounds.   Musculoskeletal:         General: Normal range of motion.   Skin:     General: Skin is warm and dry.   Neurological:      Mental Status: She is alert and oriented to person, place, and time.   Psychiatric:         Mood and Affect: Mood normal.         Lab Review  Lab Results   Component Value Date    HGBA1C 8.6 (A) 06/14/2024     Lab Results   Component Value Date    GLUCOSE 214 (H) 02/05/2024    CALCIUM 8.7 02/05/2024     02/05/2024    K 5.0 02/05/2024    CO2 30 02/05/2024     02/05/2024    BUN 25 (H) 02/05/2024    CREATININE 1.26 (H) 02/05/2024     Lab Results   Component Value Date    CHOL 177 02/05/2024    CHOL 188 07/08/2022    CHOL 201 (H) 12/30/2021     Lab Results   Component Value Date    HDL 68.4 02/05/2024    HDL 71.1 07/08/2022    HDL 78.0 12/30/2021     Lab Results   Component Value Date    LDLCALC 93 02/05/2024     Lab Results   Component Value Date    TRIG 78 02/05/2024    TRIG 77 07/08/2022    TRIG 74 12/30/2021     Lab Results   Component Value Date    TSH 5.31 (H) 02/05/2024    THYROIDPAB 400 (A) 07/08/2022       Health Maintenance:   Foot Exam: July 2023  Eye Exam: June 2022  Urine Albumin: February 5, 2024    CGM Interpretation/Plan   14 day CGM download was reviewed in detail as documented above and will be attached to chart.  A minimum of 72 hours of glucose data was used to inform the management plan outlined below.  Only 52% of values is within target range.  She continues to have episodic hypoglycemia.        Assessment/Plan   63-year-old female presents for follow up for type 1 diabetes mellitus.  Her blood pressure is elevated above goal.      She was found to have Hashimoto's thyroiditis.    Type 1 diabetes mellitus with microalbuminuria, with long-term current use of  insulin (Multi)  To continue Basaglar 12 units subcutaneous daily in the morning   To continue Insulin Aspart 1 unit for every 12 grams of carb  Please continue an insulin sliding scale as follows:   150-200mg/dL - 2 units   201-250mg/dL - 3 units   251-300 mg/dL - 4 untis   301-350mg/dL - 5 units   >351mg/dL - 6 units  To continue the use of your CGM for the intensive glucose monitoring due to the dynamic nature of insulin requirements, the narrow therapeutic index of insulin and the potentially fatal consequences of treatment  Record all insulin doses in the Mando   Please check glucose when low alarms occur   Counseled that the time in range should be above 70%  To look into the cost of the Omnipod insulin pump       Hashimoto's thyroiditis  To continue Levothyroxine 50mcg po daily   Take levothyroxine on an empty stomach with water alone, 30-60 minutes before eating or taking other medications, 4 hours before any calcium or iron supplement    For follow up in 4 months

## 2024-06-14 NOTE — PATIENT INSTRUCTIONS
Thank you for choosing [facility]  for your health care needs.  If you have any questions, concerns or medical needs, please feel free to contact our office at [phone].    Please ensure you complete your blood work one week before the next scheduled appointment.    To continue Basaglar 12 units subcutaneous daily in the morning   To continue Insulin Aspart 1 unit for every 12 grams of carb  Please continue an insulin sliding scale as follows:   150-200mg/dL - 2 units   201-250mg/dL - 3 units   251-300 mg/dL - 4 untis   301-350mg/dL - 5 units   >351mg/dL - 6 units  To continue the use of your CGM for the intensive glucose monitoring due to the dynamic nature of insulin requirements, the narrow therapeutic index of insulin and the potentially fatal consequences of treatment  Record all insulin doses in the Mando   Please check glucose when low alarms occur   Counseled that the time in range should be above 70%  To continue Levothyroxine 50mcg po daily   Take levothyroxine on an empty stomach with water alone, 30-60 minutes before eating or taking other medications, 4 hours before any calcium or iron supplement  To look into the cost of the Omnipod insulin pump   For follow up in 4 months

## 2024-06-16 NOTE — ASSESSMENT & PLAN NOTE
To continue Basaglar 12 units subcutaneous daily in the morning   To continue Insulin Aspart 1 unit for every 12 grams of carb  Please continue an insulin sliding scale as follows:   150-200mg/dL - 2 units   201-250mg/dL - 3 units   251-300 mg/dL - 4 untis   301-350mg/dL - 5 units   >351mg/dL - 6 units  To continue the use of your CGM for the intensive glucose monitoring due to the dynamic nature of insulin requirements, the narrow therapeutic index of insulin and the potentially fatal consequences of treatment  Record all insulin doses in the Mando   Please check glucose when low alarms occur   Counseled that the time in range should be above 70%  To look into the cost of the Omnipod insulin pump

## 2024-06-16 NOTE — ASSESSMENT & PLAN NOTE
To continue Levothyroxine 50mcg po daily   Take levothyroxine on an empty stomach with water alone, 30-60 minutes before eating or taking other medications, 4 hours before any calcium or iron supplement    For follow up in 4 months

## 2024-06-20 ENCOUNTER — APPOINTMENT (OUTPATIENT)
Dept: CARDIOLOGY | Facility: CLINIC | Age: 64
End: 2024-06-20
Payer: COMMERCIAL

## 2024-06-27 NOTE — PROGRESS NOTES
Referred by RAÚL Castle for CAD and HTN    Counseling:  The patient was counseled regarding diagnostic results, instructions for management, risk factor reductions, prognosis, patient and family education, impressions, risks and benefits of treatment options and importance of compliance with treatment.       History Of Present Illness:    Jeanine Ghosh is a 64 y.o. female patient whose PMH is significant for CAD s/p CABG x2 in 1997, hyperlipidemia, Hashimoto's thyroiditis, DM type 1, CKD stage 3, anxiety, fibromyalgia, and reactive airways disease. She presents today to establish cardiovascular care for the evaluation and management of CAD and HTN. The patient reports having CABG x2 in 1997. She states that following a flight to [place] in April/May she developed BLE edema and chest discomfort, but her symptoms resolved. She reports intermittent BLE edema, as well as occasional fatigue. She denies any CP, chest discomfort or SOB. The patient reports a history of a heart murmur, but is not aware of the etiology. BP is elevated today. The patient is compliant with her prescribed medications.     Past Surgical History:  She has a past surgical history that includes Other surgical history (06/15/2022); Other surgical history (08/26/2019); Other surgical history (08/26/2019); Other surgical history (08/26/2019); Other surgical history (08/26/2019); Coronary artery bypass graft (03/13/2018); and Other surgical history (07/07/2022).      Social History:  She reports that she has never smoked. She has never been exposed to tobacco smoke. She has never used smokeless tobacco. She reports that she does not currently use alcohol. She reports that she does not currently use drugs.    Family History:  No family history on file.     Allergies:  Animal dander and Morphine    Outpatient Medications:  Current Outpatient Medications   Medication Instructions    acyclovir (ZOVIRAX) 400 mg, oral, 2 times daily     "aspirin 325 mg tablet 1 tablet, oral, Daily    atenolol (TENORMIN) 25 mg, oral, Daily, as directed    Basaglar KwikPen U-100 Insulin 100 unit/mL (3 mL) pen Inject 12 units in the morning    DULoxetine (CYMBALTA) 20 mg, oral, 2 times daily, Do not crush or chew.    ezetimibe (ZETIA) 10 mg, oral, Daily    FreeStyle Mando sensor system (FreeStyle Mando 2 Sensor) kit For continuous glucose monitoring.  Cool every 14 days    insulin aspart (NovoLOG) 100 unit/mL (3 mL) pen Inject 1 unit for every 13 grams of carb.  Max of 40 units per day    insulin lispro (HumaLOG U-100 Insulin) 100 unit/mL injection To be used wt insulin pump;max of 100 units per day    levothyroxine (SYNTHROID, LEVOXYL) 50 mcg, oral, Daily, Take on an empty stomach.    lisinopril 40 mg, oral, Daily    Semglee,insulin glarg-yfgn,Pen 100 unit/mL (3 mL) insulin pen 10 UNITS BEFORE BREAKFAST AND 6 UNITS AT BED TIME        Last Recorded Vitals:  Vitals:    06/28/24 0849 06/28/24 0916   BP: 160/68 160/70   BP Location: Left arm    Pulse: 63    Weight: 67.6 kg (149 lb)    Height: 1.575 m (5' 2\")        Review of Systems   Constitutional: Positive for malaise/fatigue.   Cardiovascular:  Positive for leg swelling.   All other systems reviewed and are negative.     Physical Exam:  Constitutional:       Appearance: Healthy appearance. Not in distress.   Neck:      Vascular: No JVR. JVD normal.   Pulmonary:      Effort: Pulmonary effort is normal.      Breath sounds: Normal breath sounds. No wheezing. No rhonchi. No rales.   Chest:      Chest wall: Not tender to palpatation.   Cardiovascular:      PMI at left midclavicular line. Normal rate. Regular rhythm. Normal S1. Normal S2.       Murmurs: There is a grade 2/4 diastolic murmur at the URSB.      No gallop.  No click. No rub.   Pulses:     Intact distal pulses.   Edema:     Peripheral edema absent.   Abdominal:      General: Bowel sounds are normal.      Palpations: Abdomen is soft.      Tenderness: There is " no abdominal tenderness.   Musculoskeletal: Normal range of motion.         General: No tenderness. Skin:     General: Skin is warm and dry.   Neurological:      General: No focal deficit present.      Mental Status: Alert and oriented to person, place and time.            Last Labs:  CBC -  Lab Results   Component Value Date    WBC 6.6 02/05/2024    HGB 13.6 02/05/2024    HCT 42.0 02/05/2024     02/05/2024     02/05/2024       CMP -  Lab Results   Component Value Date    CALCIUM 8.7 02/05/2024    PROT 6.1 (L) 02/05/2024    ALBUMIN 3.6 02/05/2024    AST 22 02/05/2024    ALT 22 02/05/2024    ALKPHOS 62 02/05/2024    BILITOT 0.5 02/05/2024       LIPID PANEL -   Lab Results   Component Value Date    CHOL 177 02/05/2024    TRIG 78 02/05/2024    HDL 68.4 02/05/2024    CHHDL 2.6 02/05/2024    LDLF 102 (H) 07/08/2022    VLDL 16 02/05/2024    NHDL 109 02/05/2024       RENAL FUNCTION PANEL -   Lab Results   Component Value Date    GLUCOSE 214 (H) 02/05/2024     02/05/2024    K 5.0 02/05/2024     02/05/2024    CO2 30 02/05/2024    ANIONGAP 10 02/05/2024    BUN 25 (H) 02/05/2024    CREATININE 1.26 (H) 02/05/2024    CALCIUM 8.7 02/05/2024    ALBUMIN 3.6 02/05/2024        Lab Results   Component Value Date    HGBA1C 8.6 (A) 06/14/2024       Last Cardiology Tests:  09/13/2019 - Stress Test  Normal exercise stress myocardial perfusion imaging.    Lab review: I have personally reviewed the laboratory result(s).    Assessment/Plan   1) CAD s/p Remote CABG x2 in 1997  On  mg daily, atenolol 25 mg daily, Zetia 10 mg daily, lisinopril 40 mg daily   Stress 09/2019 negative for ischemia  Lipid panel 02/05/2024 with total cholesterol and LDL of 177 and 93 respectively   Developed BLE edema and chest discomfort following a flight to [place] in April/May - resolved after rest  Reports intermittent BLE edema  Reports occasional fatigue  Denies CP, chest discomfort or SOB  H/O heart murmur - unknown  etiology  2/6 diastolic murmur RUSB heard on exam today  Check echo  Check stress - patient states that she would like to try a treadmill stress as she would prefer that over a chemical stress, although she does recall having to stop her prior treadmill stress s/t hip pain.  F/U after testing    2) HTN  Elevated  On atenolol 25 mg daily, lisinopril 40 mg daily       Scribe Attestation  By signing my name below, I, Yecenia Murray Scralma delia   attest that this documentation has been prepared under the direction and in the presence of Surya Brewer MD.

## 2024-06-28 ENCOUNTER — APPOINTMENT (OUTPATIENT)
Dept: CARDIOLOGY | Facility: CLINIC | Age: 64
End: 2024-06-28
Payer: COMMERCIAL

## 2024-06-28 VITALS
WEIGHT: 149 LBS | HEIGHT: 62 IN | BODY MASS INDEX: 27.42 KG/M2 | DIASTOLIC BLOOD PRESSURE: 70 MMHG | HEART RATE: 63 BPM | SYSTOLIC BLOOD PRESSURE: 160 MMHG

## 2024-06-28 DIAGNOSIS — R01.1 MURMUR, HEART: Primary | ICD-10-CM

## 2024-06-28 DIAGNOSIS — I25.10 ARTERIOSCLEROTIC CORONARY ARTERY DISEASE: ICD-10-CM

## 2024-06-28 DIAGNOSIS — I10 BENIGN ESSENTIAL HYPERTENSION: ICD-10-CM

## 2024-06-28 PROCEDURE — 3078F DIAST BP <80 MM HG: CPT | Performed by: INTERNAL MEDICINE

## 2024-06-28 PROCEDURE — 4010F ACE/ARB THERAPY RXD/TAKEN: CPT | Performed by: INTERNAL MEDICINE

## 2024-06-28 PROCEDURE — 93000 ELECTROCARDIOGRAM COMPLETE: CPT | Performed by: INTERNAL MEDICINE

## 2024-06-28 PROCEDURE — 3048F LDL-C <100 MG/DL: CPT | Performed by: INTERNAL MEDICINE

## 2024-06-28 PROCEDURE — 99203 OFFICE O/P NEW LOW 30 MIN: CPT | Performed by: INTERNAL MEDICINE

## 2024-06-28 PROCEDURE — 1036F TOBACCO NON-USER: CPT | Performed by: INTERNAL MEDICINE

## 2024-06-28 PROCEDURE — 3046F HEMOGLOBIN A1C LEVEL >9.0%: CPT | Performed by: INTERNAL MEDICINE

## 2024-06-28 PROCEDURE — 3062F POS MACROALBUMINURIA REV: CPT | Performed by: INTERNAL MEDICINE

## 2024-06-28 PROCEDURE — 3077F SYST BP >= 140 MM HG: CPT | Performed by: INTERNAL MEDICINE

## 2024-06-28 ASSESSMENT — ENCOUNTER SYMPTOMS
DEPRESSION: 0
OCCASIONAL FEELINGS OF UNSTEADINESS: 0
LOSS OF SENSATION IN FEET: 0

## 2024-06-28 NOTE — PATIENT INSTRUCTIONS
Continue all current medications as prescribed.  Dr. Brewer has ordered an echocardiogram (ultrasound of the heart) to evaluate your heart function and structure.  Dr. Brewer has also ordered a stress test to ensure your heart is getting adequate blood flow and there is no evidence of any blockages within the heart arteries.    Followup with Dr. Brewer after the above tests.    If you have any questions or cardiac concerns, please call our office at [phone].

## 2024-07-15 DIAGNOSIS — E10.65 CONTROLLED TYPE 1 DIABETES MELLITUS WITH HYPERGLYCEMIA (MULTI): Primary | ICD-10-CM

## 2024-07-18 ENCOUNTER — TELEPHONE (OUTPATIENT)
Dept: PRIMARY CARE | Facility: CLINIC | Age: 64
End: 2024-07-18
Payer: COMMERCIAL

## 2024-07-18 ENCOUNTER — APPOINTMENT (OUTPATIENT)
Dept: CARDIOLOGY | Facility: HOSPITAL | Age: 64
End: 2024-07-18
Payer: COMMERCIAL

## 2024-07-18 DIAGNOSIS — I10 BENIGN ESSENTIAL HYPERTENSION: ICD-10-CM

## 2024-07-18 RX ORDER — ATENOLOL 25 MG/1
25 TABLET ORAL DAILY
Qty: 90 TABLET | Refills: 0 | Status: SHIPPED | OUTPATIENT
Start: 2024-07-18

## 2024-07-22 ENCOUNTER — APPOINTMENT (OUTPATIENT)
Dept: RADIOLOGY | Facility: HOSPITAL | Age: 64
End: 2024-07-22
Payer: COMMERCIAL

## 2024-07-22 ENCOUNTER — APPOINTMENT (OUTPATIENT)
Dept: CARDIOLOGY | Facility: HOSPITAL | Age: 64
End: 2024-07-22
Payer: COMMERCIAL

## 2024-07-24 ENCOUNTER — HOSPITAL ENCOUNTER (OUTPATIENT)
Dept: RADIOLOGY | Facility: HOSPITAL | Age: 64
Discharge: HOME | End: 2024-07-24
Payer: COMMERCIAL

## 2024-07-24 ENCOUNTER — APPOINTMENT (OUTPATIENT)
Dept: CARDIOLOGY | Facility: HOSPITAL | Age: 64
End: 2024-07-24
Payer: COMMERCIAL

## 2024-07-24 ENCOUNTER — HOSPITAL ENCOUNTER (OUTPATIENT)
Dept: CARDIOLOGY | Facility: HOSPITAL | Age: 64
Discharge: HOME | End: 2024-07-24
Payer: COMMERCIAL

## 2024-07-24 ENCOUNTER — TELEPHONE (OUTPATIENT)
Dept: CARDIOLOGY | Facility: CLINIC | Age: 64
End: 2024-07-24

## 2024-07-24 DIAGNOSIS — R07.9 CHEST PAIN, UNSPECIFIED: ICD-10-CM

## 2024-07-24 DIAGNOSIS — R01.1 MURMUR, HEART: ICD-10-CM

## 2024-07-24 DIAGNOSIS — I25.10 ARTERIOSCLEROTIC CORONARY ARTERY DISEASE: ICD-10-CM

## 2024-07-24 DIAGNOSIS — I10 BENIGN ESSENTIAL HYPERTENSION: ICD-10-CM

## 2024-07-24 PROCEDURE — 93018 CV STRESS TEST I&R ONLY: CPT | Performed by: INTERNAL MEDICINE

## 2024-07-24 PROCEDURE — 93016 CV STRESS TEST SUPVJ ONLY: CPT | Performed by: INTERNAL MEDICINE

## 2024-07-24 PROCEDURE — 93017 CV STRESS TEST TRACING ONLY: CPT

## 2024-07-24 PROCEDURE — 78452 HT MUSCLE IMAGE SPECT MULT: CPT | Performed by: INTERNAL MEDICINE

## 2024-07-24 PROCEDURE — 2500000004 HC RX 250 GENERAL PHARMACY W/ HCPCS (ALT 636 FOR OP/ED): Performed by: INTERNAL MEDICINE

## 2024-07-24 PROCEDURE — A9502 TC99M TETROFOSMIN: HCPCS | Performed by: INTERNAL MEDICINE

## 2024-07-24 PROCEDURE — 3430000001 HC RX 343 DIAGNOSTIC RADIOPHARMACEUTICALS: Performed by: INTERNAL MEDICINE

## 2024-07-24 PROCEDURE — 78452 HT MUSCLE IMAGE SPECT MULT: CPT

## 2024-07-24 RX ORDER — REGADENOSON 0.08 MG/ML
0.4 INJECTION, SOLUTION INTRAVENOUS ONCE
Status: COMPLETED | OUTPATIENT
Start: 2024-07-24 | End: 2024-07-24

## 2024-07-24 NOTE — TELEPHONE ENCOUNTER
Patient called SHARMIN HEBERT , was told that the note from the provider didn't indicate that the ECHO was warranted according to patient, SHARMIN HEBERT is requesting a letter of medical necessity as well as the results of patients nuclear stress test she had today     Ref # [id number]  Danny representative patient talked to  [phone]

## 2024-07-25 NOTE — TELEPHONE ENCOUNTER
7/25/24  1416  Called results to patient with patient verbalizing understanding.      ----- Message from Judith Soares sent at 7/24/2024  1:29 PM EDT -----  Regarding: abnormal stress test  Balwinder Meyer,    This patient's EKG portion of stress test was abnormal due to chest pain and borderline EKG changes. Waiting for the nuclear portion. Should be read by the day. Can you let patient know results when nuclear portion is done?    Shawna

## 2024-07-31 ENCOUNTER — APPOINTMENT (OUTPATIENT)
Dept: CARDIOLOGY | Facility: HOSPITAL | Age: 64
End: 2024-07-31
Payer: COMMERCIAL

## 2024-08-05 ENCOUNTER — TELEPHONE (OUTPATIENT)
Dept: CARDIOLOGY | Facility: HOSPITAL | Age: 64
End: 2024-08-05
Payer: COMMERCIAL

## 2024-08-06 ENCOUNTER — HOSPITAL ENCOUNTER (OUTPATIENT)
Dept: CARDIOLOGY | Facility: HOSPITAL | Age: 64
Discharge: HOME | End: 2024-08-06
Payer: COMMERCIAL

## 2024-08-06 ENCOUNTER — APPOINTMENT (OUTPATIENT)
Dept: CARDIOLOGY | Facility: CLINIC | Age: 64
End: 2024-08-06
Payer: COMMERCIAL

## 2024-08-06 VITALS
DIASTOLIC BLOOD PRESSURE: 78 MMHG | WEIGHT: 150 LBS | HEIGHT: 62 IN | OXYGEN SATURATION: 98 % | SYSTOLIC BLOOD PRESSURE: 148 MMHG | HEART RATE: 83 BPM | BODY MASS INDEX: 27.6 KG/M2

## 2024-08-06 DIAGNOSIS — I25.10 ARTERIOSCLEROTIC CORONARY ARTERY DISEASE: ICD-10-CM

## 2024-08-06 DIAGNOSIS — R06.02 SHORTNESS OF BREATH: ICD-10-CM

## 2024-08-06 DIAGNOSIS — R01.1 MURMUR, HEART: ICD-10-CM

## 2024-08-06 DIAGNOSIS — I10 BENIGN ESSENTIAL HYPERTENSION: ICD-10-CM

## 2024-08-06 LAB
AORTIC VALVE MEAN GRADIENT: 6 MMHG
AORTIC VALVE PEAK VELOCITY: 1.52 M/S
AV PEAK GRADIENT: 9.2 MMHG
AVA (PEAK VEL): 1.8 CM2
AVA (VTI): 1.81 CM2
EJECTION FRACTION APICAL 4 CHAMBER: 66.7
EJECTION FRACTION: 59 %
LEFT ATRIUM VOLUME AREA LENGTH INDEX BSA: 19.2 ML/M2
LEFT VENTRICLE INTERNAL DIMENSION DIASTOLE: 3.9 CM (ref 3.5–6)
LEFT VENTRICULAR OUTFLOW TRACT DIAMETER: 2 CM
LV EJECTION FRACTION BIPLANE: 59 %
MITRAL VALVE E/A RATIO: 1.42
MITRAL VALVE E/E' RATIO: 10.8
RIGHT VENTRICLE PEAK SYSTOLIC PRESSURE: 38.8 MMHG
TRICUSPID ANNULAR PLANE SYSTOLIC EXCURSION: 2.1 CM

## 2024-08-06 PROCEDURE — 3048F LDL-C <100 MG/DL: CPT | Performed by: INTERNAL MEDICINE

## 2024-08-06 PROCEDURE — 93306 TTE W/DOPPLER COMPLETE: CPT

## 2024-08-06 PROCEDURE — 3008F BODY MASS INDEX DOCD: CPT | Performed by: INTERNAL MEDICINE

## 2024-08-06 PROCEDURE — 3062F POS MACROALBUMINURIA REV: CPT | Performed by: INTERNAL MEDICINE

## 2024-08-06 PROCEDURE — 4010F ACE/ARB THERAPY RXD/TAKEN: CPT | Performed by: INTERNAL MEDICINE

## 2024-08-06 PROCEDURE — 3046F HEMOGLOBIN A1C LEVEL >9.0%: CPT | Performed by: INTERNAL MEDICINE

## 2024-08-06 PROCEDURE — 3077F SYST BP >= 140 MM HG: CPT | Performed by: INTERNAL MEDICINE

## 2024-08-06 PROCEDURE — 3078F DIAST BP <80 MM HG: CPT | Performed by: INTERNAL MEDICINE

## 2024-08-06 PROCEDURE — 93306 TTE W/DOPPLER COMPLETE: CPT | Performed by: INTERNAL MEDICINE

## 2024-08-06 PROCEDURE — 99212 OFFICE O/P EST SF 10 MIN: CPT | Performed by: INTERNAL MEDICINE

## 2024-08-06 PROCEDURE — 2500000004 HC RX 250 GENERAL PHARMACY W/ HCPCS (ALT 636 FOR OP/ED): Performed by: INTERNAL MEDICINE

## 2024-08-06 NOTE — LETTER
-SBP<180  -PRN hydralazine  -Increased Amlodopine to 5mg - 10mg 1/13     August 6, 2024     Judy Diggs MD  [address] [employer] [facility] [address]  [facility] [address]    Patient: Jeanine Ghosh   YOB: 1960   Date of Visit: 8/6/2024       Dear Dr. Judy Diggs MD:    Thank you for referring Jeanine Ghosh to me for evaluation. Below are my notes for this consultation.  If you have questions, please do not hesitate to call me. I look forward to following your patient along with you.       Sincerely,     Surya Brewer MD      CC: No Recipients  ______________________________________________________________________________________    Counseling:  The patient was counseled regarding diagnostic results, instructions for management, risk factor reductions, prognosis, patient and family education, impressions, risks and benefits of treatment options and importance of compliance with treatment.      Chief Complaint:  The patient presents today for 6-week followup of LE edema and fatigue s/p stress test and echocardiogram.      History Of Present Illness:    Jeanine Ghosh is a 64 y.o. female patient who presents today for 6-week followup of LE edema and fatigue s/p stress test. Her PMH is significant for CAD s/p CABG x2 in 1997, hyperlipidemia, Hashimoto's thyroiditis, DM type 1, CKD stage 3, anxiety, fibromyalgia, and reactive airways disease. Stress testing performed 07/24/2024 was negative for ischemia. Echocardiogram performed today, 08/06/2024, demonstrated normal LV systolic function and trace to mild aortic regurgitation. Today, the patient states that she is feeling well with no new cardiac complaints.      Past Surgical History:  She has a past surgical history that includes Other surgical history (06/15/2022); Other surgical history (08/26/2019); Other surgical history (08/26/2019); Other surgical history (08/26/2019); Other surgical history (08/26/2019); Coronary artery bypass graft (03/13/2018); and Other surgical history (07/07/2022).    "   Social History:  She reports that she has never smoked. She has never been exposed to tobacco smoke. She has never used smokeless tobacco. She reports that she does not currently use alcohol. She reports that she does not currently use drugs.    Family History:  No family history on file.     Allergies:  Animal dander and Morphine    Outpatient Medications:  Current Outpatient Medications   Medication Instructions   • acyclovir (ZOVIRAX) 400 mg, oral, 2 times daily   • aspirin 325 mg tablet 1 tablet, oral, Daily   • atenolol (TENORMIN) 25 mg, oral, Daily, as directed   • Basaglar KwikPen U-100 Insulin 100 unit/mL (3 mL) pen Inject 12 units in the morning   • DULoxetine (CYMBALTA) 20 mg, oral, 2 times daily, Do not crush or chew.   • ezetimibe (ZETIA) 10 mg, oral, Daily   • FreeStyle Mando sensor system (FreeStyle Mando 2 Sensor) kit For continuous glucose monitoring.  Cool every 14 days   • insulin aspart (NovoLOG) 100 unit/mL (3 mL) pen Inject 1 unit for every 13 grams of carb.  Max of 40 units per day   • insulin lispro (HumaLOG U-100 Insulin) 100 unit/mL injection To be used wtih insulin pump;max of 100 units per day   • levothyroxine (SYNTHROID, LEVOXYL) 50 mcg, oral, Daily, Take on an empty stomach.   • lisinopril 40 mg, oral, Daily   • Semglee,insulin glarg-yfgn,Pen 100 unit/mL (3 mL) insulin pen 10 UNITS BEFORE BREAKFAST AND 6 UNITS AT BED TIME        Last Recorded Vitals:  Vitals:    08/06/24 1126   BP: 148/78   BP Location: Left arm   Pulse: 83   SpO2: 98%   Weight: 68 kg (150 lb)   Height: 1.575 m (5' 2\")         Review of Systems   Constitutional: Positive for malaise/fatigue.   Cardiovascular:  Positive for leg swelling.   All other systems reviewed and are negative.     Physical Exam:  Constitutional:       Appearance: Healthy appearance. Not in distress.   Neck:      Vascular: No JVR. JVD normal.   Pulmonary:      Effort: Pulmonary effort is normal.      Breath sounds: Normal breath sounds. No " wheezing. No rhonchi. No rales.   Chest:      Chest wall: Not tender to palpatation.   Cardiovascular:      PMI at left midclavicular line. Normal rate. Regular rhythm. Normal S1. Normal S2.       Murmurs: There is a grade 2/4 diastolic murmur at the URSB.      No gallop.  No click. No rub.   Pulses:     Intact distal pulses.   Edema:     Peripheral edema absent.   Abdominal:      General: Bowel sounds are normal.      Palpations: Abdomen is soft.      Tenderness: There is no abdominal tenderness.   Musculoskeletal: Normal range of motion.         General: No tenderness. Skin:     General: Skin is warm and dry.   Neurological:      General: No focal deficit present.      Mental Status: Alert and oriented to person, place and time.            Last Labs:  CBC -  Lab Results   Component Value Date    WBC 6.6 02/05/2024    HGB 13.6 02/05/2024    HCT 42.0 02/05/2024     02/05/2024     02/05/2024       CMP -  Lab Results   Component Value Date    CALCIUM 8.7 02/05/2024    PROT 6.1 (L) 02/05/2024    ALBUMIN 3.6 02/05/2024    AST 22 02/05/2024    ALT 22 02/05/2024    ALKPHOS 62 02/05/2024    BILITOT 0.5 02/05/2024       LIPID PANEL -   Lab Results   Component Value Date    CHOL 177 02/05/2024    TRIG 78 02/05/2024    HDL 68.4 02/05/2024    CHHDL 2.6 02/05/2024    LDLF 102 (H) 07/08/2022    VLDL 16 02/05/2024    NHDL 109 02/05/2024       RENAL FUNCTION PANEL -   Lab Results   Component Value Date    GLUCOSE 214 (H) 02/05/2024     02/05/2024    K 5.0 02/05/2024     02/05/2024    CO2 30 02/05/2024    ANIONGAP 10 02/05/2024    BUN 25 (H) 02/05/2024    CREATININE 1.26 (H) 02/05/2024    CALCIUM 8.7 02/05/2024    ALBUMIN 3.6 02/05/2024        Lab Results   Component Value Date    HGBA1C 8.6 (A) 06/14/2024       Last Cardiology Tests:  08/06/2024 - TTE  1. The left ventricular systolic function is normal, with a León's biplane calculated ejection fraction of 59%.  2. There is normal right ventricular  global systolic function.    07/24/2024 - Stress Test  1. Normal stress myocardial perfusion imaging in response to pharmacologic stress. Mild attenuation of the anterolateral segments seen on the supine images that improves with prone imaging most likely soft tissue attenuation. Well-maintained left ventricular function.   2. Abnormal due to chest pain and borderline EKG abnormalities.     09/13/2019 - Stress Test  Normal exercise stress myocardial perfusion imaging.    Diagnostic review: I have personally reviewed the result(s) of the Stress Test.     Assessment/Plan  1) CAD s/p Remote CABG x2 in 1997  On  mg daily, atenolol 25 mg daily, Zetia 10 mg daily, lisinopril 40 mg daily   Stress 09/2019 negative for ischemia  Lipid panel 02/05/2024 with total cholesterol and LDL of 177 and 93 respectively   Developed BLE edema and chest discomfort following a flight to [place] in April/May - resolved after rest  Reports intermittent BLE edema  Reports occasional fatigue  Denies CP, chest discomfort or SOB  H/O heart murmur - unknown etiology  2/6 diastolic murmur RUSB   Stress 07/24/2024 negative for ischemia  TTE 08/06/2024 with LVEF 59%, trace-mild aortic regurgitation   Continue current medical Rx   F/U 1 year     2) HTN  Stable  On atenolol 25 mg daily, lisinopril 40 mg daily   Continue current medical Rx  F/U 1 year        Scribe Attestation  By signing my name below, IYecenia Scribe   attest that this documentation has been prepared under the direction and in the presence of Surya Brewer MD.

## 2024-08-06 NOTE — PROGRESS NOTES
Counseling:  The patient was counseled regarding diagnostic results, instructions for management, risk factor reductions, prognosis, patient and family education, impressions, risks and benefits of treatment options and importance of compliance with treatment.      Chief Complaint:  The patient presents today for 6-week followup of LE edema and fatigue s/p stress test and echocardiogram.      History Of Present Illness:    Jeanine Ghosh is a 64 y.o. female patient who presents today for 6-week followup of LE edema and fatigue s/p stress test. Her PMH is significant for CAD s/p CABG x2 in 1997, hyperlipidemia, Hashimoto's thyroiditis, DM type 1, CKD stage 3, anxiety, fibromyalgia, and reactive airways disease. Stress testing performed 07/24/2024 was negative for ischemia. Echocardiogram performed today, 08/06/2024, demonstrated normal LV systolic function and trace to mild aortic regurgitation. Today, the patient states that she is feeling well with no new cardiac complaints.      Past Surgical History:  She has a past surgical history that includes Other surgical history (06/15/2022); Other surgical history (08/26/2019); Other surgical history (08/26/2019); Other surgical history (08/26/2019); Other surgical history (08/26/2019); Coronary artery bypass graft (03/13/2018); and Other surgical history (07/07/2022).      Social History:  She reports that she has never smoked. She has never been exposed to tobacco smoke. She has never used smokeless tobacco. She reports that she does not currently use alcohol. She reports that she does not currently use drugs.    Family History:  No family history on file.     Allergies:  Animal dander and Morphine    Outpatient Medications:  Current Outpatient Medications   Medication Instructions    acyclovir (ZOVIRAX) 400 mg, oral, 2 times daily    aspirin 325 mg tablet 1 tablet, oral, Daily    atenolol (TENORMIN) 25 mg, oral, Daily, as directed    Basaglar KwikPen U-100 Insulin  "100 unit/mL (3 mL) pen Inject 12 units in the morning    DULoxetine (CYMBALTA) 20 mg, oral, 2 times daily, Do not crush or chew.    ezetimibe (ZETIA) 10 mg, oral, Daily    FreeStyle Mando sensor system (FreeStyle Mando 2 Sensor) kit For continuous glucose monitoring.  Cool every 14 days    insulin aspart (NovoLOG) 100 unit/mL (3 mL) pen Inject 1 unit for every 13 grams of carb.  Max of 40 units per day    insulin lispro (HumaLOG U-100 Insulin) 100 unit/mL injection To be used wt insulin pump;max of 100 units per day    levothyroxine (SYNTHROID, LEVOXYL) 50 mcg, oral, Daily, Take on an empty stomach.    lisinopril 40 mg, oral, Daily    Semglee,insulin glarg-yfgn,Pen 100 unit/mL (3 mL) insulin pen 10 UNITS BEFORE BREAKFAST AND 6 UNITS AT BED TIME        Last Recorded Vitals:  Vitals:    08/06/24 1126   BP: 148/78   BP Location: Left arm   Pulse: 83   SpO2: 98%   Weight: 68 kg (150 lb)   Height: 1.575 m (5' 2\")         Review of Systems   Constitutional: Positive for malaise/fatigue.   Cardiovascular:  Positive for leg swelling.   All other systems reviewed and are negative.     Physical Exam:  Constitutional:       Appearance: Healthy appearance. Not in distress.   Neck:      Vascular: No JVR. JVD normal.   Pulmonary:      Effort: Pulmonary effort is normal.      Breath sounds: Normal breath sounds. No wheezing. No rhonchi. No rales.   Chest:      Chest wall: Not tender to palpatation.   Cardiovascular:      PMI at left midclavicular line. Normal rate. Regular rhythm. Normal S1. Normal S2.       Murmurs: There is a grade 2/4 diastolic murmur at the URSB.      No gallop.  No click. No rub.   Pulses:     Intact distal pulses.   Edema:     Peripheral edema absent.   Abdominal:      General: Bowel sounds are normal.      Palpations: Abdomen is soft.      Tenderness: There is no abdominal tenderness.   Musculoskeletal: Normal range of motion.         General: No tenderness. Skin:     General: Skin is warm and dry. "   Neurological:      General: No focal deficit present.      Mental Status: Alert and oriented to person, place and time.            Last Labs:  CBC -  Lab Results   Component Value Date    WBC 6.6 02/05/2024    HGB 13.6 02/05/2024    HCT 42.0 02/05/2024     02/05/2024     02/05/2024       CMP -  Lab Results   Component Value Date    CALCIUM 8.7 02/05/2024    PROT 6.1 (L) 02/05/2024    ALBUMIN 3.6 02/05/2024    AST 22 02/05/2024    ALT 22 02/05/2024    ALKPHOS 62 02/05/2024    BILITOT 0.5 02/05/2024       LIPID PANEL -   Lab Results   Component Value Date    CHOL 177 02/05/2024    TRIG 78 02/05/2024    HDL 68.4 02/05/2024    CHHDL 2.6 02/05/2024    LDLF 102 (H) 07/08/2022    VLDL 16 02/05/2024    NHDL 109 02/05/2024       RENAL FUNCTION PANEL -   Lab Results   Component Value Date    GLUCOSE 214 (H) 02/05/2024     02/05/2024    K 5.0 02/05/2024     02/05/2024    CO2 30 02/05/2024    ANIONGAP 10 02/05/2024    BUN 25 (H) 02/05/2024    CREATININE 1.26 (H) 02/05/2024    CALCIUM 8.7 02/05/2024    ALBUMIN 3.6 02/05/2024        Lab Results   Component Value Date    HGBA1C 8.6 (A) 06/14/2024       Last Cardiology Tests:  08/06/2024 - TTE  1. The left ventricular systolic function is normal, with a León's biplane calculated ejection fraction of 59%.  2. There is normal right ventricular global systolic function.    07/24/2024 - Stress Test  1. Normal stress myocardial perfusion imaging in response to pharmacologic stress. Mild attenuation of the anterolateral segments seen on the supine images that improves with prone imaging most likely soft tissue attenuation. Well-maintained left ventricular function.   2. Abnormal due to chest pain and borderline EKG abnormalities.     09/13/2019 - Stress Test  Normal exercise stress myocardial perfusion imaging.    Diagnostic review: I have personally reviewed the result(s) of the Stress Test.     Assessment/Plan   1) CAD s/p Remote CABG x2 in 1997  On ASA  325 mg daily, atenolol 25 mg daily, Zetia 10 mg daily, lisinopril 40 mg daily   Stress 09/2019 negative for ischemia  Lipid panel 02/05/2024 with total cholesterol and LDL of 177 and 93 respectively   Developed BLE edema and chest discomfort following a flight to [place] in April/May - resolved after rest  Reports intermittent BLE edema  Reports occasional fatigue  Denies CP, chest discomfort or SOB  H/O heart murmur - unknown etiology  2/6 diastolic murmur RUSB   Stress 07/24/2024 negative for ischemia  TTE 08/06/2024 with LVEF 59%, trace-mild aortic regurgitation   Continue current medical Rx   F/U 1 year     2) HTN  Stable  On atenolol 25 mg daily, lisinopril 40 mg daily   Continue current medical Rx  F/U 1 year        Scribe Attestation  By signing my name below, IYecenia Scribe   attest that this documentation has been prepared under the direction and in the presence of Surya Brewer MD.

## 2024-08-06 NOTE — PATIENT INSTRUCTIONS
Continue all current medications as prescribed.  Followup with Dr. Brewer in 1 year, sooner should any issues or concerns arise before then.     If you have any questions or cardiac concerns, please call our office at [phone].

## 2024-08-12 ENCOUNTER — TELEPHONE (OUTPATIENT)
Dept: PRIMARY CARE | Facility: CLINIC | Age: 64
End: 2024-08-12
Payer: COMMERCIAL

## 2024-08-12 DIAGNOSIS — E06.3 HASHIMOTO'S THYROIDITIS: ICD-10-CM

## 2024-08-12 RX ORDER — LEVOTHYROXINE SODIUM 50 UG/1
50 TABLET ORAL DAILY
Qty: 90 TABLET | Refills: 0 | Status: SHIPPED | OUTPATIENT
Start: 2024-08-12

## 2024-08-12 NOTE — TELEPHONE ENCOUNTER
Med Refill   levothyroxine (Synthroid, Levoxyl) 50 mcg tablet [[id number]]     [facility] - [address] [address]  Phone: [phone]  Fax: [phone]  HARJIT #: --     LOV: 5/23/24    NOV: 2/5/25

## 2024-09-20 ENCOUNTER — TELEPHONE (OUTPATIENT)
Dept: PRIMARY CARE | Facility: CLINIC | Age: 64
End: 2024-09-20
Payer: COMMERCIAL

## 2024-09-20 NOTE — TELEPHONE ENCOUNTER
patient calling regarding mammogram  Would like to know if you wanted screening or diagnostic  Test done due to sensitivity from fibromyalgia

## 2024-10-14 ENCOUNTER — APPOINTMENT (OUTPATIENT)
Dept: ENDOCRINOLOGY | Facility: CLINIC | Age: 64
End: 2024-10-14
Payer: COMMERCIAL

## 2024-10-17 ENCOUNTER — TELEPHONE (OUTPATIENT)
Dept: PRIMARY CARE | Facility: CLINIC | Age: 64
End: 2024-10-17
Payer: COMMERCIAL

## 2024-10-17 NOTE — TELEPHONE ENCOUNTER
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    Cell # [phone]

## 2024-10-18 ENCOUNTER — HOSPITAL ENCOUNTER (OUTPATIENT)
Dept: RADIOLOGY | Facility: HOSPITAL | Age: 64
Discharge: HOME | End: 2024-10-18
Payer: COMMERCIAL

## 2024-10-18 VITALS — WEIGHT: 148 LBS | BODY MASS INDEX: 27.23 KG/M2 | HEIGHT: 62 IN

## 2024-10-18 DIAGNOSIS — Z12.31 SCREENING MAMMOGRAM FOR BREAST CANCER: ICD-10-CM

## 2024-10-18 PROCEDURE — 77067 SCR MAMMO BI INCL CAD: CPT

## 2024-10-21 DIAGNOSIS — E10.9 CONTROLLED DIABETES MELLITUS TYPE 1 WITHOUT COMPLICATIONS: ICD-10-CM

## 2024-10-21 RX ORDER — FLASH GLUCOSE SENSOR
KIT MISCELLANEOUS
Qty: 6 EACH | Refills: 3 | Status: SHIPPED | OUTPATIENT
Start: 2024-10-21

## 2024-10-21 NOTE — TELEPHONE ENCOUNTER
Patient requesting refill on Mando sensors to [facility]. She has rescheduled her appointment for 2/3/2025.  Pended for approval.

## 2024-10-22 DIAGNOSIS — M79.7 FIBROMYALGIA: ICD-10-CM

## 2024-10-22 DIAGNOSIS — I10 BENIGN ESSENTIAL HYPERTENSION: ICD-10-CM

## 2024-10-22 RX ORDER — ATENOLOL 25 MG/1
25 TABLET ORAL DAILY
Qty: 90 TABLET | Refills: 3 | Status: SHIPPED | OUTPATIENT
Start: 2024-10-22

## 2024-10-22 RX ORDER — DULOXETIN HYDROCHLORIDE 20 MG/1
20 CAPSULE, DELAYED RELEASE ORAL 2 TIMES DAILY
Qty: 90 CAPSULE | Refills: 3 | Status: SHIPPED | OUTPATIENT
Start: 2024-10-22 | End: 2025-04-20

## 2024-10-22 NOTE — TELEPHONE ENCOUNTER
Patient called to request medication refill.    Last appointment with our providers: 05/23/2024  Next appointment with our providers: 02/05/2025  Name of Medication: atenolol (Tenormin) 25 mg tablet     Pharmacy: [facility]

## 2024-11-04 DIAGNOSIS — E06.3 HASHIMOTO'S THYROIDITIS: ICD-10-CM

## 2024-11-04 RX ORDER — LEVOTHYROXINE SODIUM 50 UG/1
50 TABLET ORAL DAILY
Qty: 90 TABLET | Refills: 3 | Status: SHIPPED | OUTPATIENT
Start: 2024-11-04

## 2024-12-02 DIAGNOSIS — R80.9 TYPE 1 DIABETES MELLITUS WITH MICROALBUMINURIA, WITH LONG-TERM CURRENT USE OF INSULIN (MULTI): ICD-10-CM

## 2024-12-02 DIAGNOSIS — E10.29 TYPE 1 DIABETES MELLITUS WITH MICROALBUMINURIA, WITH LONG-TERM CURRENT USE OF INSULIN (MULTI): ICD-10-CM

## 2024-12-02 RX ORDER — LISINOPRIL 40 MG/1
40 TABLET ORAL DAILY
Qty: 90 TABLET | Refills: 1 | Status: SHIPPED | OUTPATIENT
Start: 2024-12-02

## 2024-12-06 ENCOUNTER — LAB (OUTPATIENT)
Dept: LAB | Facility: LAB | Age: 64
End: 2024-12-06
Payer: COMMERCIAL

## 2024-12-06 DIAGNOSIS — E10.65 CONTROLLED TYPE 1 DIABETES MELLITUS WITH HYPERGLYCEMIA: ICD-10-CM

## 2024-12-06 LAB
CREAT UR-MCNC: 82.9 MG/DL (ref 20–320)
MICROALBUMIN UR-MCNC: 498.2 MG/L
MICROALBUMIN/CREAT UR: 601 UG/MG CREAT

## 2024-12-06 PROCEDURE — 82043 UR ALBUMIN QUANTITATIVE: CPT

## 2024-12-06 PROCEDURE — 82570 ASSAY OF URINE CREATININE: CPT

## 2024-12-13 ENCOUNTER — TELEPHONE (OUTPATIENT)
Dept: PRIMARY CARE | Facility: CLINIC | Age: 64
End: 2024-12-13
Payer: COMMERCIAL

## 2024-12-13 DIAGNOSIS — M79.7 FIBROMYALGIA: ICD-10-CM

## 2024-12-13 NOTE — TELEPHONE ENCOUNTER
Her Duloxetine was increased to 2 daily & it is still being sent in for #90 ---should be #180 for 3 month supply    To [facility]

## 2024-12-14 RX ORDER — DULOXETIN HYDROCHLORIDE 20 MG/1
20 CAPSULE, DELAYED RELEASE ORAL 2 TIMES DAILY
Qty: 180 CAPSULE | Refills: 0 | Status: SHIPPED | OUTPATIENT
Start: 2024-12-14 | End: 2025-03-14

## 2024-12-23 DIAGNOSIS — E10.29 TYPE 1 DIABETES MELLITUS WITH MICROALBUMINURIA, WITH LONG-TERM CURRENT USE OF INSULIN (MULTI): Primary | ICD-10-CM

## 2024-12-23 DIAGNOSIS — R80.9 TYPE 1 DIABETES MELLITUS WITH MICROALBUMINURIA, WITH LONG-TERM CURRENT USE OF INSULIN (MULTI): Primary | ICD-10-CM

## 2024-12-23 RX ORDER — DAPAGLIFLOZIN 5 MG/1
5 TABLET, FILM COATED ORAL DAILY
Qty: 90 TABLET | Refills: 3 | Status: SHIPPED | OUTPATIENT
Start: 2024-12-23 | End: 2025-12-23

## 2025-01-07 ENCOUNTER — TELEPHONE (OUTPATIENT)
Dept: CARDIOLOGY | Facility: HOSPITAL | Age: 65
End: 2025-01-07
Payer: COMMERCIAL

## 2025-01-07 NOTE — TELEPHONE ENCOUNTER
"Patient called in with symptoms of \"squeezing\" routed call to nurse to assess care plan of when to schedule.     Thank you!  Miller   "

## 2025-01-07 NOTE — TELEPHONE ENCOUNTER
"Rn called pt at this time regarding gets right tight and \"squeezing\" on her left side chest. Pt states it happens \"more than not\". No other symptoms noted and goes away at rest. RN recommended if symptoms return and are lasting more than a minute and not going away, you develop any other symptoms as well like SOB/nausea/fainting. Pt verbalized understanding and RN tasked the front to get her scheduled.  "

## 2025-01-08 ENCOUNTER — TELEPHONE (OUTPATIENT)
Dept: CARDIOLOGY | Facility: HOSPITAL | Age: 65
End: 2025-01-08
Payer: COMMERCIAL

## 2025-01-08 NOTE — TELEPHONE ENCOUNTER
Patient called office noting feelings of tightness in her side. Patient spoke with nurse MARVIN Moreau and was advised to be seen. Contacted patient and a f/up was booked.

## 2025-01-09 NOTE — TELEPHONE ENCOUNTER
Pt stopped by the  with reoccurring symptoms of squeezing in her chest and shoulder that was noticeable to her when walking into work this morning. RN advised her to go to the ER due to her history of bypass and pt refused. RN went over risks and just wanted an appointment with SARA tomorrow that she was offered.

## 2025-01-10 ENCOUNTER — OFFICE VISIT (OUTPATIENT)
Dept: CARDIOLOGY | Facility: HOSPITAL | Age: 65
End: 2025-01-10
Payer: COMMERCIAL

## 2025-01-10 VITALS
WEIGHT: 154 LBS | HEIGHT: 62 IN | SYSTOLIC BLOOD PRESSURE: 122 MMHG | DIASTOLIC BLOOD PRESSURE: 68 MMHG | HEART RATE: 74 BPM | OXYGEN SATURATION: 98 % | BODY MASS INDEX: 28.34 KG/M2

## 2025-01-10 DIAGNOSIS — I10 BENIGN ESSENTIAL HYPERTENSION: ICD-10-CM

## 2025-01-10 DIAGNOSIS — I25.10 ARTERIOSCLEROTIC CORONARY ARTERY DISEASE: ICD-10-CM

## 2025-01-10 DIAGNOSIS — R07.89 OTHER CHEST PAIN: Primary | ICD-10-CM

## 2025-01-10 DIAGNOSIS — Z95.1 S/P CABG X 2: ICD-10-CM

## 2025-01-10 LAB
ATRIAL RATE: 63 BPM
P AXIS: 59 DEGREES
P OFFSET: 202 MS
P ONSET: 141 MS
PR INTERVAL: 170 MS
Q ONSET: 226 MS
QRS COUNT: 10 BEATS
QRS DURATION: 54 MS
QT INTERVAL: 398 MS
QTC CALCULATION(BAZETT): 407 MS
QTC FREDERICIA: 404 MS
R AXIS: -10 DEGREES
T AXIS: 29 DEGREES
T OFFSET: 425 MS
VENTRICULAR RATE: 63 BPM

## 2025-01-10 PROCEDURE — 99214 OFFICE O/P EST MOD 30 MIN: CPT | Performed by: PHYSICIAN ASSISTANT

## 2025-01-10 PROCEDURE — 1036F TOBACCO NON-USER: CPT | Performed by: PHYSICIAN ASSISTANT

## 2025-01-10 PROCEDURE — 3074F SYST BP LT 130 MM HG: CPT | Performed by: PHYSICIAN ASSISTANT

## 2025-01-10 PROCEDURE — 3078F DIAST BP <80 MM HG: CPT | Performed by: PHYSICIAN ASSISTANT

## 2025-01-10 PROCEDURE — 93005 ELECTROCARDIOGRAM TRACING: CPT | Performed by: PHYSICIAN ASSISTANT

## 2025-01-10 PROCEDURE — 3008F BODY MASS INDEX DOCD: CPT | Performed by: PHYSICIAN ASSISTANT

## 2025-01-10 PROCEDURE — 4010F ACE/ARB THERAPY RXD/TAKEN: CPT | Performed by: PHYSICIAN ASSISTANT

## 2025-01-10 RX ORDER — ISOSORBIDE MONONITRATE 30 MG/1
30 TABLET, EXTENDED RELEASE ORAL DAILY
Qty: 30 TABLET | Refills: 1 | Status: SHIPPED | OUTPATIENT
Start: 2025-01-10 | End: 2025-03-11

## 2025-01-10 RX ORDER — ATENOLOL 50 MG/1
50 TABLET ORAL DAILY
Qty: 30 TABLET | Refills: 1 | Status: SHIPPED | OUTPATIENT
Start: 2025-01-10 | End: 2025-03-11

## 2025-01-10 NOTE — PROGRESS NOTES
Cardiology Follow Up  Chief Complaint:   Complaints of CP.  Was advised to go to ER but refused.     History Of Present Illness:    Jeanine Ghosh is a 64 y.o. female patient who presents today for 6-week followup of LE edema and fatigue s/p stress test. Her PMH is significant for CAD s/p CABG x2 in 1997, hyperlipidemia, Hashimoto's thyroiditis, DM type 1, CKD stage 3, anxiety, fibromyalgia, and reactive airways disease. Stress testing performed 07/24/2024 was negative for ischemia. Echocardiogram performed today, 08/06/2024, demonstrated normal LV systolic function and trace to mild aortic regurgitation. Today, the patient states that she is feeling well with no new cardiac complaints.    1-10-25: This is a 64-year-old female patient known to Dr. Brewer.  Presents with her  today for follow-up of ongoing symptoms of exertional chest pain.  She had called the office and was referred to the emergency department but did not want to go so she presents today for follow-up.  Most of her episodes are tightness in her upper chest toward the left shoulder similar to what she had had prior to having bypass surgery and had disclosed this to Dr. Brewer even back at a visit earlier this year.  Stress testing was done and during maximal infusion of regadenoson she did have some EKG changes and the same type of chest pain however her perfusion imaging did not show any obvious ischemia.  Her echocardiogram has shown normal LV systolic function.  I discussed with her the possibility of proceeding with a heart catheterization but she does have chronic kidney disease and this was concerning to her so at this time she is agreeable to increase medical therapy with doubling of her atenolol dosing and we will add isosorbide.  Patient does understand that if she has intractable discomfort not related to any particular activity to please call 911 or go to the emergency department and she verbalizes understanding.     Last Recorded  "Vitals:  Vitals:    01/10/25 1422   BP: 122/68   BP Location: Left arm   Patient Position: Sitting   BP Cuff Size: Adult   Pulse: 74   SpO2: 98%   Weight: 69.9 kg (154 lb)   Height: 1.575 m (5' 2\")       Past Medical History:  She has a past medical history of Acute URI (03/27/2023), Acute URI (05/23/2024), Anxiety disorder, unspecified (11/24/2013), Arteriosclerosis of coronary artery (05/23/2024), Atherosclerosis of coronary artery (07/20/2009), Bronchitis (05/23/2024), Bronchitis (05/23/2024), Chronic lymphocytic thyroiditis (05/23/2024), COVID-19 virus infection (03/27/2023), COVID-19 virus infection (05/23/2024), Encounter for screening for malignant neoplasm of colon (06/03/2016), Exposure to severe acute respiratory syndrome coronavirus 2 (SARS-CoV-2) (05/23/2024), Hearing loss of left ear (05/23/2024), Herpesviral infection, unspecified (12/07/2021), Left upper quadrant pain (02/06/2017), Low back pain, unspecified (11/24/2013), Mixed hyperlipidemia (05/23/2024), Other age-related incipient cataract, unspecified eye, Other enthesopathies, not elsewhere classified (11/24/2013), Pain in unspecified joint (12/09/2021), Pain in unspecified joint (01/06/2017), Polyp of colon (11/24/2013), Primary osteoarthritis, unspecified hand (11/24/2013), Proteinuria, unspecified (02/21/2014), Pure hypercholesterolemia (05/23/2024), Radiculopathy, cervical region (07/30/2015), Reactive airway disease ([facility]) (05/23/2024), Spinal stenosis, cervical region (07/30/2015), Type 1 diabetes mellitus without complications (08/22/2022), Viral illness (03/27/2023), and Viral illness (05/23/2024).    Past Surgical History:  She has a past surgical history that includes Other surgical history (06/15/2022); Other surgical history (08/26/2019); Other surgical history (08/26/2019); Other surgical history (08/26/2019); Other surgical history (08/26/2019); Coronary artery bypass graft (03/13/2018); and Other surgical history (07/07/2022).   "    Social History:  She reports that she has never smoked. She has never been exposed to tobacco smoke. She has never used smokeless tobacco. She reports that she does not currently use alcohol. She reports that she does not currently use drugs.    Family History:  No family history on file.     Allergies:  Animal dander and Morphine    Outpatient Medications:  Current Outpatient Medications   Medication Instructions    aspirin 325 mg tablet 1 tablet, Daily    atenolol (TENORMIN) 25 mg, oral, Daily, as directed    Basaglar KwikPen U-100 Insulin 100 unit/mL (3 mL) pen Inject 12 units in the morning    dapagliflozin propanediol (FARXIGA) 5 mg, oral, Daily    DULoxetine (CYMBALTA) 20 mg, oral, 2 times daily, Do not crush or chew.    ezetimibe (ZETIA) 10 mg, oral, Daily    flash glucose sensor kit (FreeStyle Mando 2 Sensor) kit For continuous glucose monitoring.  Cool every 14 days    insulin aspart (NovoLOG) 100 unit/mL (3 mL) pen Inject 1 unit for every 13 grams of carb.  Max of 40 units per day    insulin lispro (HumaLOG U-100 Insulin) 100 unit/mL injection To be used wt insulin pump;max of 100 units per day    levothyroxine (SYNTHROID, LEVOXYL) 50 mcg, oral, Daily, Take on an empty stomach.    lisinopril 40 mg, oral, Daily    Semglee,insulin glarg-yfgn,Pen 100 unit/mL (3 mL) insulin pen 10 UNITS BEFORE BREAKFAST AND 6 UNITS AT BED TIME     ROS   Physical Exam  Constitutional:       General: She is not in acute distress.     Appearance: Normal appearance.   HENT:      Mouth/Throat:      Mouth: Mucous membranes are moist.   Neck:      Comments: Flat neck veins  Cardiovascular:      Rate and Rhythm: Normal rate and regular rhythm.      Heart sounds: Murmur heard.      Gallop: faint AI murmur.   Pulmonary:      Effort: Pulmonary effort is normal.      Breath sounds: Normal breath sounds.   Abdominal:      General: Bowel sounds are normal.      Palpations: Abdomen is soft.      Tenderness: There is no abdominal  tenderness.   Musculoskeletal:      Right lower leg: No edema.      Left lower leg: No edema.   Skin:     General: Skin is warm and dry.   Neurological:      Mental Status: She is alert and oriented to person, place, and time.   Psychiatric:         Behavior: Behavior is cooperative.           Last Labs:  CBC -  Lab Results   Component Value Date    WBC 6.6 02/05/2024    HGB 13.6 02/05/2024    HCT 42.0 02/05/2024     02/05/2024     02/05/2024       CMP -  Lab Results   Component Value Date    CALCIUM 8.7 02/05/2024    PROT 6.1 (L) 02/05/2024    ALBUMIN 3.6 02/05/2024    AST 22 02/05/2024    ALT 22 02/05/2024    ALKPHOS 62 02/05/2024    BILITOT 0.5 02/05/2024       LIPID PANEL -   Lab Results   Component Value Date    CHOL 177 02/05/2024    TRIG 78 02/05/2024    HDL 68.4 02/05/2024    CHHDL 2.6 02/05/2024    LDLF 102 (H) 07/08/2022    VLDL 16 02/05/2024    NHDL 109 02/05/2024       RENAL FUNCTION PANEL -   Lab Results   Component Value Date    GLUCOSE 214 (H) 02/05/2024     02/05/2024    K 5.0 02/05/2024     02/05/2024    CO2 30 02/05/2024    ANIONGAP 10 02/05/2024    BUN 25 (H) 02/05/2024    CREATININE 1.26 (H) 02/05/2024    CALCIUM 8.7 02/05/2024    ALBUMIN 3.6 02/05/2024        Lab Results   Component Value Date    HGBA1C 8.6 (A) 06/14/2024       Last Cardiology Tests:    Echo:  Transthoracic Echo (TTE) Complete 08/06/2024--CONCLUSIONS:   1. The left ventricular systolic function is normal, with a León's biplane calculated ejection fraction of 59%.   2. There is normal right ventricular global systolic function.  Ejection Fractions:  EF   Date/Time Value Ref Range Status   08/06/2024 08:18 AM 59 %      Stress Test:  Nuclear Stress Test 07/24/2024--IMPRESSION:  1. Normal stress myocardial perfusion imaging in response to  pharmacologic stress. Mild attenuation of the anterolateral segments  seen on the supine images that improves with prone imaging most  likely soft tissue  attenuation.  2. Well-maintained left ventricular function.      Lab review: I have personally reviewed the laboratory result(s).    Assessment/Plan   Problem List Items Addressed This Visit             ICD-10-CM       Cardiac and Vasculature    Arteriosclerotic coronary artery disease I25.10    Benign essential hypertension I10    S/P CABG x 2 Z95.1    Other chest pain - Primary R07.89    Relevant Orders    ECG 12 lead (Clinic Performed) (Completed)   Chest pain--history of bypass surgery in 1997 by Dr. Dwo at  in [place].  She had a LIMA graft and a left radial artery graft.  Again recent stress testing showed some EKG changes and did have chest discomfort but her nuclear imaging did not show any large areas of ischemia.  Her echocardiogram is normal with no specific wall motion abnormalities.  At this time considering her CKD she would like to be conservative and maximize medical therapy so we will increase her atenolol to 50 mg daily and add isosorbide.  She will continue normal activities but if ongoing episodes of chest discomfort that are exertional in nature she will contact the office and at that point time we will likely have to proceed with a heart catheterization.  Will review with Dr. Brewer.  Her EKG today shows sinus rhythm with nonspecific T wave flattening which is really unchanged from her last EKG.  Hypertension--she tells me that her systolic blood pressures at home have been elevated so increase in the atenolol and isosorbide should help her blood pressure as well.  Hyperlipidemia--patient intolerant to statins and does admit that she had stopped taking the Zetia with an LDL cholesterol of 93.  Will get her back on the Zetia and she is to resume that.  Overall patient is stable but having these concerning episodes of exertional chest discomfort with prior history of coronary disease.  Although her nuclear imaging did not show obvious ischemia she did have EKG changes during maximal infusion  of the regadenoson.  She is agreeable to maximize medical therapies we will double her beta-blocker and add isosorbide.  If she continues to have symptoms she will contact the office and at that point in time she will likely need heart catheterization.  Will review with Dr. Brewer.    Addendum--Case and EKG reviewed with Dr. Brewer.  At this time he is in agreement of increasing medical therapy with the understanding that if we have failure medical therapy that she would need a heart catheterization.      Mckinley Cortes PA-C  1/10/2025  2:30 PM

## 2025-01-10 NOTE — PATIENT INSTRUCTIONS
Due to her complaints and concerns with chest discomfort we will try increasing medical therapy including increasing beta-blockers which is your atenolol to 50 mg once daily and add long-acting nitro which is called Imdur 30 mg once a day.  Okay to take the atenolol and nighttime but prefer the isosorbide in the morning with food as it may cause a little bit of a headache but we wanted in your system through the daytime when you are more active.  Continue your other cardiac medications.  Although your EKG shows nothing acute today if you continue to have exertional concerns and chest pain after addition and adjustment of these medications please call Dr. Brewer's office as we would likely need to proceed with a heart catheterization.

## 2025-01-13 ENCOUNTER — TELEPHONE (OUTPATIENT)
Dept: ENDOCRINOLOGY | Facility: CLINIC | Age: 65
End: 2025-01-13
Payer: COMMERCIAL

## 2025-01-14 ENCOUNTER — TELEPHONE (OUTPATIENT)
Dept: CARDIOLOGY | Facility: HOSPITAL | Age: 65
End: 2025-01-14
Payer: COMMERCIAL

## 2025-01-14 ENCOUNTER — APPOINTMENT (OUTPATIENT)
Dept: CARDIOLOGY | Facility: HOSPITAL | Age: 65
End: 2025-01-14
Payer: COMMERCIAL

## 2025-01-14 NOTE — TELEPHONE ENCOUNTER
Patient called into office asking to send a message to her care team that her newly prescribed isosorbide mononitrate ER (Imdur) 30 mg 24 hr tablet [[id number]] is working very well for her. Patient does state that she is retaining water and would like to talk with a provider. Patient confirmed number on file is correct.

## 2025-01-14 NOTE — TELEPHONE ENCOUNTER
1/14/25  1153  Returned call to patient who requested a call back to inform her she was tolerating her Imdur and to discuss water retention.    Patient reports that her hands and feet were retaining water; patient also made reference to diuretics.    Recommended to patient to:  1) Watch sodium intake  2)  Elevated affected extremities when not up  3) Ensure adequate hydration.    Patient verbalized understanding.

## 2025-01-24 ENCOUNTER — TELEPHONE (OUTPATIENT)
Dept: CARDIOLOGY | Facility: HOSPITAL | Age: 65
End: 2025-01-24
Payer: COMMERCIAL

## 2025-01-27 ENCOUNTER — TELEPHONE (OUTPATIENT)
Dept: CARDIOLOGY | Facility: HOSPITAL | Age: 65
End: 2025-01-27
Payer: COMMERCIAL

## 2025-01-27 DIAGNOSIS — R07.9 CHEST PAIN, UNSPECIFIED TYPE: Primary | ICD-10-CM

## 2025-01-27 NOTE — TELEPHONE ENCOUNTER
RN called and notified patient of Dr. Brewer's order for heart cath, patient verbalized understanding and agreeable to cath

## 2025-01-27 NOTE — TELEPHONE ENCOUNTER
RN called and spoke with patient, per patient she is feeling 80% better. Patient states when bending over and placing the sheets on her bed she did have an episode of chest discomfort, she also had similar episodes when bending down and looking under the couch. Patient reports being winded at times, no shoulder pain, but mild chest tightness at times. Since last appointment reports 6 episodes. Patient also concerned with low blood sugars since starting the medications.

## 2025-01-28 ENCOUNTER — TELEPHONE (OUTPATIENT)
Dept: CARDIOLOGY | Facility: HOSPITAL | Age: 65
End: 2025-01-28
Payer: COMMERCIAL

## 2025-01-28 PROBLEM — R07.9 CHEST PAIN: Status: ACTIVE | Noted: 2025-01-27

## 2025-01-28 NOTE — TELEPHONE ENCOUNTER
Spoke with patient and scheduled their LHC on 2/11/2025 arrival time of 8:30 am and a procedure time of 9:30 am.     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     Routed to the Lizzeth RN for instructions.      Thank you!   Miller MCGINNIS

## 2025-01-29 ENCOUNTER — TELEPHONE (OUTPATIENT)
Dept: CARDIOLOGY | Facility: HOSPITAL | Age: 65
End: 2025-01-29
Payer: COMMERCIAL

## 2025-01-29 NOTE — TELEPHONE ENCOUNTER
Patient LVM about needing to reschedule their LHC.    Rescheduled and spoke with patient.  LHC procedure is 7:30 and arrival is 6:30.    Routing to RN for support     Thank you!  Miller MCGINNIS

## 2025-01-30 DIAGNOSIS — R80.9 TYPE 1 DIABETES MELLITUS WITH MICROALBUMINURIA, WITH LONG-TERM CURRENT USE OF INSULIN (MULTI): Primary | ICD-10-CM

## 2025-01-30 DIAGNOSIS — E10.29 TYPE 1 DIABETES MELLITUS WITH MICROALBUMINURIA, WITH LONG-TERM CURRENT USE OF INSULIN (MULTI): Primary | ICD-10-CM

## 2025-01-30 NOTE — TELEPHONE ENCOUNTER
(Next appt 2/3/2025)    Patient insurance no longer cover novolog aspart, preferred is insulin lispro humalog

## 2025-01-31 ENCOUNTER — TELEPHONE (OUTPATIENT)
Dept: CARDIOLOGY | Facility: HOSPITAL | Age: 65
End: 2025-01-31
Payer: COMMERCIAL

## 2025-01-31 NOTE — TELEPHONE ENCOUNTER
PT requested I call back Monday for instructions.        2/14/2025 @ 7:30 am procedure time and arrival time of 6:30 am.     Called cath instructions to patient including review of date and arrival time.  Verified no need for dye prep.  Labs reviewed.  Nothing to eat or drink after midnight except atenolol/ASA with a sip of water the morning of the cath.  You will need a .  Bring your ID, insurance cards and either a perfect list of the medications you are swallowing or the medications in original bottles.  Wear comfortable clothing.  There is a possibility of staying over night for observation so pack a bag with necessities for that.  Patient correctly repeated instructions, verbalized understanding and is agreeable to the plan.

## 2025-01-31 NOTE — TELEPHONE ENCOUNTER
----- Message from Thao MOLINA sent at 1/29/2025  3:40 PM EST -----  Regarding: ## AG [facility] 2/14/2025 PRE CATH INSTRUCTIONS ##  Hello,     This patient called in and needed to reschedule her 2/11 [facility].     She has Ambetter and they are rescheduled for 2/14/2025 @ 7:30 am procedure time and arrival time of 6:30 am.    She stated that no one had called her with pre cath instructions yet.  (she is also a diabetic)     Can you please call her with her pre cath instructions.    Thank you!  Miller MCGINNIS

## 2025-02-03 ENCOUNTER — APPOINTMENT (OUTPATIENT)
Dept: ENDOCRINOLOGY | Facility: CLINIC | Age: 65
End: 2025-02-03
Payer: COMMERCIAL

## 2025-02-03 VITALS
WEIGHT: 154.8 LBS | DIASTOLIC BLOOD PRESSURE: 64 MMHG | BODY MASS INDEX: 28.49 KG/M2 | SYSTOLIC BLOOD PRESSURE: 122 MMHG | HEART RATE: 77 BPM | HEIGHT: 62 IN

## 2025-02-03 DIAGNOSIS — E10.29 TYPE 1 DIABETES MELLITUS WITH MICROALBUMINURIA, WITH LONG-TERM CURRENT USE OF INSULIN (MULTI): Primary | ICD-10-CM

## 2025-02-03 DIAGNOSIS — E06.3 HASHIMOTO'S THYROIDITIS: ICD-10-CM

## 2025-02-03 DIAGNOSIS — R80.9 TYPE 1 DIABETES MELLITUS WITH MICROALBUMINURIA, WITH LONG-TERM CURRENT USE OF INSULIN (MULTI): Primary | ICD-10-CM

## 2025-02-03 PROCEDURE — 4010F ACE/ARB THERAPY RXD/TAKEN: CPT | Performed by: INTERNAL MEDICINE

## 2025-02-03 PROCEDURE — 1036F TOBACCO NON-USER: CPT | Performed by: INTERNAL MEDICINE

## 2025-02-03 PROCEDURE — 3078F DIAST BP <80 MM HG: CPT | Performed by: INTERNAL MEDICINE

## 2025-02-03 PROCEDURE — 95251 CONT GLUC MNTR ANALYSIS I&R: CPT | Performed by: INTERNAL MEDICINE

## 2025-02-03 PROCEDURE — 99214 OFFICE O/P EST MOD 30 MIN: CPT | Performed by: INTERNAL MEDICINE

## 2025-02-03 PROCEDURE — 3074F SYST BP LT 130 MM HG: CPT | Performed by: INTERNAL MEDICINE

## 2025-02-03 PROCEDURE — 3008F BODY MASS INDEX DOCD: CPT | Performed by: INTERNAL MEDICINE

## 2025-02-03 RX ORDER — INSULIN GLARGINE-YFGN 100 [IU]/ML
12 INJECTION, SOLUTION SUBCUTANEOUS EVERY MORNING
Qty: 15 ML | Refills: 5 | Status: SHIPPED | OUTPATIENT
Start: 2025-02-03 | End: 2025-08-02

## 2025-02-03 NOTE — PATIENT INSTRUCTIONS
Thank you for choosing [facility]  for your health care needs.  If you have any questions, concerns or medical needs, please feel free to contact our office at [phone].    Please ensure you complete your blood work one week before the next scheduled appointment.    To change Semglee 12 units subcutaneous daily in the morning   To change Insulin Aspart to 1 unit for every 10 grams of carb  Please change an insulin sliding scale as follows:   150-200mg/dL - 2 units   201-250mg/dL - 4 units   251-300 mg/dL - 6 untis   301-350mg/dL - 8 units   >351mg/dL - 10 units  To continue the use of your CGM for the intensive glucose monitoring due to the dynamic nature of insulin requirements, the narrow therapeutic index of insulin and the potentially fatal consequences of treatment  Counseled that the time in range should be above 70%  To continue Levothyroxine 50mcg po daily   Take levothyroxine on an empty stomach with water alone, 30-60 minutes before eating or taking other medications, 4 hours before any calcium or iron supplement  Farxiga can lead to DKA in a type 1 diabetic   For follow up in 4 months

## 2025-02-03 NOTE — PROGRESS NOTES
"Subjective   Jeanine Ghosh is a 64 y.o. female who presents for a follow-up evaluation of Type 1 diabetes mellitus. The initial diagnosis of diabetes was made in 1969 .     She has been having left shoulder pain and a squeezing sensation to her chest.  She is for a cardiac cath on 2/14.      Known complications due to diabetes included   CAD s/p 2v CABG in 1997 and chronic kidney disease    Cardiovascular risk factors include diabetes mellitus and microalbuminuria. The patient is on an ACE inhibitor or angiotensin II receptor blocker.  The patient has not been previously hospitalized due to diabetic ketoacidosis.     Current symptoms/problems include none. Her clinical course has been stable.     The patient is currently checking the blood glucose multiple times per day.    Patient is using: continuous glucose monitor                                                                Hypoglycemia frequency: 3% (decreased from 5%)  Hypoglycemia awareness: Yes     Current Medication Regimen  Semglee 12 units SQ daily in the morning   Novolog 1:15g, though she was recommended to do 1:12g      Exercise regimen - trampoline     Review of Systems   Cardiovascular:  Positive for chest pain.   Musculoskeletal:  Positive for arthralgias.   All other systems reviewed and are negative.    Objective   /64   Pulse 77   Ht 1.575 m (5' 2\")   Wt 70.2 kg (154 lb 12.8 oz)   BMI 28.31 kg/m²   Physical Exam  Vitals and nursing note reviewed.   Constitutional:       General: She is not in acute distress.     Appearance: Normal appearance. She is normal weight.   HENT:      Head: Normocephalic and atraumatic.      Nose: Nose normal.      Mouth/Throat:      Mouth: Mucous membranes are moist.   Eyes:      Extraocular Movements: Extraocular movements intact.   Cardiovascular:      Rate and Rhythm: Normal rate and regular rhythm.      Comments: Healed sternotomy scar   Pulmonary:      Effort: Pulmonary effort is normal.      Breath " sounds: Normal breath sounds.   Musculoskeletal:         General: Normal range of motion.   Skin:     General: Skin is warm and dry.   Neurological:      Mental Status: She is alert and oriented to person, place, and time.   Psychiatric:         Mood and Affect: Mood normal.         Lab Review  Lab Results   Component Value Date    HGBA1C 8.6 (A) 06/14/2024     Lab Results   Component Value Date    GLUCOSE 214 (H) 02/05/2024    CALCIUM 8.7 02/05/2024     02/05/2024    K 5.0 02/05/2024    CO2 30 02/05/2024     02/05/2024    BUN 25 (H) 02/05/2024    CREATININE 1.26 (H) 02/05/2024     Lab Results   Component Value Date    CHOL 177 02/05/2024    CHOL 188 07/08/2022    CHOL 201 (H) 12/30/2021     Lab Results   Component Value Date    HDL 68.4 02/05/2024    HDL 71.1 07/08/2022    HDL 78.0 12/30/2021     Lab Results   Component Value Date    LDLCALC 93 02/05/2024     Lab Results   Component Value Date    TRIG 78 02/05/2024    TRIG 77 07/08/2022    TRIG 74 12/30/2021     Lab Results   Component Value Date    TSH 5.31 (H) 02/05/2024    THYROIDPAB 400 (A) 07/08/2022       Health Maintenance:   Foot Exam: July 2023  Eye Exam: June 2022  Urine Albumin: February 5, 2024    CGM Interpretation  14 day CGM download was reviewed in detail as documented above and will be attached to chart.  A minimum of 72 hours of glucose data was used to inform the management plan outlined below.    Sufficient data to analyze:  Yes; CGM active 94% of the time  57% of values is above target range, which is above goal.    40% of values is spent in target range, and thus is below goal   3% of values is spent with hypoglycemia, and thus at goal   She is hyperglycemic from 9am until midnight       Assessment/Plan   64-year-old female presents for follow up for type 1 diabetes mellitus.  Her blood pressure is at goal.      She was found to have Hashimoto's thyroiditis.    Type 1 diabetes mellitus with microalbuminuria, with long-term current  use of insulin (Multi)  To change Semglee 12 units subcutaneous daily in the morning   To change Insulin Aspart to 1 unit for every 10 grams of carb  Please change an insulin sliding scale as follows:   150-200mg/dL - 2 units   201-250mg/dL - 4 units   251-300 mg/dL - 6 untis   301-350mg/dL - 8 units   >351mg/dL - 10 units  To continue the use of your CGM for the intensive glucose monitoring due to the dynamic nature of insulin requirements, the narrow therapeutic index of insulin and the potentially fatal consequences of treatment  Counseled that the time in range should be above 70%  Farxiga can lead to DKA in a type 1 diabetic (5mg ordered by PCP)  To obtain blood tests     Hashimoto's thyroiditis  To continue Levothyroxine 50mcg po daily   Take levothyroxine on an empty stomach with water alone, 30-60 minutes before eating or taking other medications, 4 hours before any calcium or iron supplement  To obtain blood tests      For follow up in 4 months

## 2025-02-04 ENCOUNTER — TELEPHONE (OUTPATIENT)
Dept: CARDIOLOGY | Facility: HOSPITAL | Age: 65
End: 2025-02-04
Payer: COMMERCIAL

## 2025-02-04 DIAGNOSIS — I25.10 ARTERIOSCLEROTIC CORONARY ARTERY DISEASE: ICD-10-CM

## 2025-02-04 DIAGNOSIS — I10 BENIGN ESSENTIAL HYPERTENSION: ICD-10-CM

## 2025-02-04 DIAGNOSIS — R07.9 CHEST PAIN, UNSPECIFIED TYPE: Primary | ICD-10-CM

## 2025-02-04 NOTE — TELEPHONE ENCOUNTER
Heart Cath  2/14/2025     Arrival time: 0630  Procedure time: 7:30       Called cath instructions to patient including review of date and arrival time.  Verified no need for dye prep.  Labs ordered to be drawn.  Nothing to eat or drink after midnight except atenolol and aspirin (per patient takes at HS)  with a sip of water the morning of the cath.  Please hold your farxiga for 3 days prior to your cath (Patient states not taking). Have your ordering provider adjust your insulin for the procedure. You will need a .  Bring your ID, insurance cards and either a perfect list of the medications you are swallowing or the medications in original bottles.  Wear comfortable clothing.  There is a possibility of staying over night for observation so pack a bag with necessities for that.  Patient verbalized understanding and is agreeable to the plan. Questions answered.

## 2025-02-04 NOTE — TELEPHONE ENCOUNTER
RN called patient, no answer, left message for patient to return call at [phone] to review heart cath instructions.

## 2025-02-05 ENCOUNTER — APPOINTMENT (OUTPATIENT)
Dept: PRIMARY CARE | Facility: CLINIC | Age: 65
End: 2025-02-05
Payer: COMMERCIAL

## 2025-02-05 VITALS
DIASTOLIC BLOOD PRESSURE: 72 MMHG | BODY MASS INDEX: 28.85 KG/M2 | WEIGHT: 156.8 LBS | HEART RATE: 84 BPM | OXYGEN SATURATION: 98 % | RESPIRATION RATE: 16 BRPM | HEIGHT: 62 IN | SYSTOLIC BLOOD PRESSURE: 122 MMHG

## 2025-02-05 DIAGNOSIS — R80.9 TYPE 1 DIABETES MELLITUS WITH MICROALBUMINURIA, WITH LONG-TERM CURRENT USE OF INSULIN (MULTI): ICD-10-CM

## 2025-02-05 DIAGNOSIS — I25.10 ARTERIOSCLEROTIC CORONARY ARTERY DISEASE: ICD-10-CM

## 2025-02-05 DIAGNOSIS — Z00.00 ANNUAL PHYSICAL EXAM: ICD-10-CM

## 2025-02-05 DIAGNOSIS — G99.2 STENOSIS OF CERVICAL SPINE WITH MYELOPATHY: ICD-10-CM

## 2025-02-05 DIAGNOSIS — E10.29 TYPE 1 DIABETES MELLITUS WITH MICROALBUMINURIA, WITH LONG-TERM CURRENT USE OF INSULIN (MULTI): ICD-10-CM

## 2025-02-05 DIAGNOSIS — E06.3 HASHIMOTO'S THYROIDITIS: ICD-10-CM

## 2025-02-05 DIAGNOSIS — J45.20 MILD INTERMITTENT REACTIVE AIRWAY DISEASE WITH WHEEZING WITHOUT COMPLICATION (HHS-HCC): ICD-10-CM

## 2025-02-05 DIAGNOSIS — Z95.1 S/P CABG X 2: ICD-10-CM

## 2025-02-05 DIAGNOSIS — N18.31 STAGE 3A CHRONIC KIDNEY DISEASE (MULTI): Primary | ICD-10-CM

## 2025-02-05 DIAGNOSIS — M48.02 STENOSIS OF CERVICAL SPINE WITH MYELOPATHY: ICD-10-CM

## 2025-02-05 DIAGNOSIS — M79.7 FIBROMYALGIA: ICD-10-CM

## 2025-02-05 DIAGNOSIS — I10 BENIGN ESSENTIAL HYPERTENSION: ICD-10-CM

## 2025-02-05 PROCEDURE — 3008F BODY MASS INDEX DOCD: CPT | Performed by: FAMILY MEDICINE

## 2025-02-05 PROCEDURE — 3078F DIAST BP <80 MM HG: CPT | Performed by: FAMILY MEDICINE

## 2025-02-05 PROCEDURE — 3074F SYST BP LT 130 MM HG: CPT | Performed by: FAMILY MEDICINE

## 2025-02-05 PROCEDURE — 4010F ACE/ARB THERAPY RXD/TAKEN: CPT | Performed by: FAMILY MEDICINE

## 2025-02-05 PROCEDURE — 99396 PREV VISIT EST AGE 40-64: CPT | Performed by: FAMILY MEDICINE

## 2025-02-05 PROCEDURE — 1036F TOBACCO NON-USER: CPT | Performed by: FAMILY MEDICINE

## 2025-02-05 RX ORDER — DULOXETIN HYDROCHLORIDE 20 MG/1
20 CAPSULE, DELAYED RELEASE ORAL 2 TIMES DAILY
Qty: 180 CAPSULE | Refills: 3 | Status: SHIPPED | OUTPATIENT
Start: 2025-02-05 | End: 2026-01-31

## 2025-02-05 RX ORDER — INSULIN ASPART 100 [IU]/ML
INJECTION, SOLUTION INTRAVENOUS; SUBCUTANEOUS
Qty: 30 ML | Refills: 5 | Status: SHIPPED | OUTPATIENT
Start: 2025-02-05

## 2025-02-05 ASSESSMENT — PATIENT HEALTH QUESTIONNAIRE - PHQ9
SUM OF ALL RESPONSES TO PHQ9 QUESTIONS 1 AND 2: 0
2. FEELING DOWN, DEPRESSED OR HOPELESS: NOT AT ALL
1. LITTLE INTEREST OR PLEASURE IN DOING THINGS: NOT AT ALL

## 2025-02-05 ASSESSMENT — ANXIETY QUESTIONNAIRES
6. BECOMING EASILY ANNOYED OR IRRITABLE: NOT AT ALL
3. WORRYING TOO MUCH ABOUT DIFFERENT THINGS: NOT AT ALL
1. FEELING NERVOUS, ANXIOUS, OR ON EDGE: NOT AT ALL
4. TROUBLE RELAXING: NOT AT ALL
GAD7 TOTAL SCORE: 0
IF YOU CHECKED OFF ANY PROBLEMS ON THIS QUESTIONNAIRE, HOW DIFFICULT HAVE THESE PROBLEMS MADE IT FOR YOU TO DO YOUR WORK, TAKE CARE OF THINGS AT HOME, OR GET ALONG WITH OTHER PEOPLE: NOT DIFFICULT AT ALL
5. BEING SO RESTLESS THAT IT IS HARD TO SIT STILL: NOT AT ALL
2. NOT BEING ABLE TO STOP OR CONTROL WORRYING: NOT AT ALL
7. FEELING AFRAID AS IF SOMETHING AWFUL MIGHT HAPPEN: NOT AT ALL

## 2025-02-05 ASSESSMENT — ENCOUNTER SYMPTOMS
LOSS OF SENSATION IN FEET: 0
OCCASIONAL FEELINGS OF UNSTEADINESS: 0
ARTHRALGIAS: 1
DEPRESSION: 0

## 2025-02-05 ASSESSMENT — COLUMBIA-SUICIDE SEVERITY RATING SCALE - C-SSRS
6. HAVE YOU EVER DONE ANYTHING, STARTED TO DO ANYTHING, OR PREPARED TO DO ANYTHING TO END YOUR LIFE?: NO
1. IN THE PAST MONTH, HAVE YOU WISHED YOU WERE DEAD OR WISHED YOU COULD GO TO SLEEP AND NOT WAKE UP?: NO
2. HAVE YOU ACTUALLY HAD ANY THOUGHTS OF KILLING YOURSELF?: NO

## 2025-02-05 NOTE — PATIENT INSTRUCTIONS
It was good to see you  Will await results of cath and labwork that has been ordered.   Go see the eye doctor.

## 2025-02-05 NOTE — PROGRESS NOTES
Subjective   Jeanine Ghosh is a 64 y.o. female and is here for a comprehensive physical exam. The patient reports no problems.  Last physical: one year. She has seen cardiology and had stress test, was having some heart issues, started Imdur, with improvement, and they then recommended a heart cath.   Sees Cardiology, [facility], and Endo Dr. Bagley. Last A1C 8.6.   Plans to   Changes no  Concerns no  Dentist yes  Vision no  Hearing Problems no  Diet yes  Exercise no  Alcohol no  Drugs no  Social History     Tobacco Use   Smoking Status Never    Passive exposure: Never   Smokeless Tobacco Never          Do you take any herbs or supplements that were not prescribed by a doctor? yes  Are you taking calcium supplements? no  Are you taking aspirin daily? yes 325      History:  LMP: No LMP recorded. Patient is postmenopausal.  Menopause at 55 years    Do you have pain that bothers you in your daily life? no  Review of Systems   HENT:  Positive for hearing loss.         Wears hearing aids.         Objective   Physical Exam  Vitals reviewed.   Constitutional:       Appearance: Normal appearance.   HENT:      Head: Normocephalic and atraumatic.      Right Ear: Tympanic membrane normal.      Left Ear: Tympanic membrane normal.      Nose: Nose normal.      Mouth/Throat:      Mouth: Mucous membranes are moist.      Pharynx: Oropharynx is clear.   Eyes:      Extraocular Movements: Extraocular movements intact.      Conjunctiva/sclera: Conjunctivae normal.      Pupils: Pupils are equal, round, and reactive to light.   Cardiovascular:      Rate and Rhythm: Normal rate and regular rhythm.      Pulses: Normal pulses.      Heart sounds: Normal heart sounds.   Pulmonary:      Effort: Pulmonary effort is normal.      Breath sounds: Normal breath sounds.   Abdominal:      General: Abdomen is flat. Bowel sounds are normal.      Palpations: Abdomen is soft.   Musculoskeletal:         General: Normal range of motion.      Cervical back:  Normal range of motion and neck supple.   Skin:     General: Skin is warm and dry.      Capillary Refill: Capillary refill takes less than 2 seconds.   Neurological:      General: No focal deficit present.      Mental Status: She is alert and oriented to person, place, and time.   Psychiatric:         Mood and Affect: Mood normal.         Behavior: Behavior normal.         Assessment/Plan   Healthy female exam. She is managing diabetes and coronary artery disease. To have cath 2/14/2025. Due for labwork now, will watch for results.     1. Diabetes- seeing endo, cardiology, and doing well.     2. Patient Counseling:  --Nutrition: Stressed importance of moderation in sodium/caffeine intake, saturated fat and cholesterol, caloric balance, sufficient intake of fresh fruits, vegetables, fiber, calcium, iron, and 1 mg of folate supplement per day (for females capable of pregnancy).  --Discussed the issue of estrogen replacement, calcium supplement, and the daily use of baby aspirin.  --Exercise: Stressed the importance of regular exercise.   --Substance Abuse: Discussed cessation/primary prevention of tobacco, alcohol, or other drug use; driving or other dangerous activities under the influence; availability of treatment for abuse.    --Sexuality: Discussed sexually transmitted diseases, partner selection, use of condoms, avoidance of unintended pregnancy  and contraceptive alternatives.   --Injury prevention: Discussed safety belts, safety helmets, smoke detector, smoking near bedding or upholstery.   --Dental health: Discussed importance of regular tooth brushing, flossing, and dental visits.  --Immunizations reviewed.  --Discussed benefits of screening colonoscopy.  --After hours service discussed with patient  3. Discussed the patient's BMI with her.  The BMI is in the acceptable range.  4. Follow up in one year

## 2025-02-06 NOTE — ASSESSMENT & PLAN NOTE
To change Semglee 12 units subcutaneous daily in the morning   To change Insulin Aspart to 1 unit for every 10 grams of carb  Please change an insulin sliding scale as follows:   150-200mg/dL - 2 units   201-250mg/dL - 4 units   251-300 mg/dL - 6 untis   301-350mg/dL - 8 units   >351mg/dL - 10 units  To continue the use of your CGM for the intensive glucose monitoring due to the dynamic nature of insulin requirements, the narrow therapeutic index of insulin and the potentially fatal consequences of treatment  Counseled that the time in range should be above 70%  Farxiga can lead to DKA in a type 1 diabetic (5mg ordered by PCP)  To obtain blood tests

## 2025-02-06 NOTE — ASSESSMENT & PLAN NOTE
To continue Levothyroxine 50mcg po daily   Take levothyroxine on an empty stomach with water alone, 30-60 minutes before eating or taking other medications, 4 hours before any calcium or iron supplement  To obtain blood tests

## 2025-02-06 NOTE — TELEPHONE ENCOUNTER
(Next appt 6/27/2025)    Patient is asking for a 90 day supply of semglee vials to be sent to [facility] pharmacy, she state she is now taking 15 units.

## 2025-02-11 ENCOUNTER — TELEPHONE (OUTPATIENT)
Dept: CARDIOLOGY | Facility: HOSPITAL | Age: 65
End: 2025-02-11
Payer: COMMERCIAL

## 2025-02-11 LAB
ALBUMIN SERPL-MCNC: 3.7 G/DL (ref 3.6–5.1)
ALP SERPL-CCNC: 60 U/L (ref 37–153)
ALT SERPL-CCNC: 17 U/L (ref 6–29)
ANION GAP SERPL CALCULATED.4IONS-SCNC: 6 MMOL/L (CALC) (ref 7–17)
ANION GAP SERPL CALCULATED.4IONS-SCNC: 8 MMOL/L (CALC) (ref 7–17)
AST SERPL-CCNC: 16 U/L (ref 10–35)
BILIRUB SERPL-MCNC: 0.5 MG/DL (ref 0.2–1.2)
BUN SERPL-MCNC: 39 MG/DL (ref 7–25)
BUN SERPL-MCNC: 40 MG/DL (ref 7–25)
BUN/CREAT SERPL: 29 (CALC) (ref 6–22)
CALCIUM SERPL-MCNC: 8.7 MG/DL (ref 8.6–10.4)
CALCIUM SERPL-MCNC: 9 MG/DL (ref 8.6–10.4)
CHLORIDE SERPL-SCNC: 107 MMOL/L (ref 98–110)
CHLORIDE SERPL-SCNC: 107 MMOL/L (ref 98–110)
CHOLEST SERPL-MCNC: 213 MG/DL
CHOLEST/HDLC SERPL: 3.2 (CALC)
CO2 SERPL-SCNC: 24 MMOL/L (ref 20–32)
CO2 SERPL-SCNC: 27 MMOL/L (ref 20–32)
CREAT SERPL-MCNC: 1.24 MG/DL (ref 0.5–1.05)
CREAT SERPL-MCNC: 1.38 MG/DL (ref 0.5–1.05)
EGFRCR SERPLBLD CKD-EPI 2021: 43 ML/MIN/1.73M2
EGFRCR SERPLBLD CKD-EPI 2021: 49 ML/MIN/1.73M2
ERYTHROCYTE [DISTWIDTH] IN BLOOD BY AUTOMATED COUNT: 12.5 % (ref 11–15)
EST. AVERAGE GLUCOSE BLD GHB EST-MCNC: 214 MG/DL
EST. AVERAGE GLUCOSE BLD GHB EST-SCNC: 11.9 MMOL/L
GLUCOSE SERPL-MCNC: 130 MG/DL (ref 65–99)
GLUCOSE SERPL-MCNC: 131 MG/DL (ref 65–99)
HBA1C MFR BLD: 9.1 % OF TOTAL HGB
HCT VFR BLD AUTO: 40.5 % (ref 35–45)
HDLC SERPL-MCNC: 67 MG/DL
HGB BLD-MCNC: 13.2 G/DL (ref 11.7–15.5)
LDLC SERPL CALC-MCNC: 127 MG/DL (CALC)
MCH RBC QN AUTO: 31.2 PG (ref 27–33)
MCHC RBC AUTO-ENTMCNC: 32.6 G/DL (ref 32–36)
MCV RBC AUTO: 95.7 FL (ref 80–100)
NONHDLC SERPL-MCNC: 146 MG/DL (CALC)
PLATELET # BLD AUTO: 362 THOUSAND/UL (ref 140–400)
PMV BLD REES-ECKER: 9.9 FL (ref 7.5–12.5)
POTASSIUM SERPL-SCNC: 5 MMOL/L (ref 3.5–5.3)
POTASSIUM SERPL-SCNC: 5 MMOL/L (ref 3.5–5.3)
PROT SERPL-MCNC: 6.1 G/DL (ref 6.1–8.1)
RBC # BLD AUTO: 4.23 MILLION/UL (ref 3.8–5.1)
SODIUM SERPL-SCNC: 139 MMOL/L (ref 135–146)
SODIUM SERPL-SCNC: 140 MMOL/L (ref 135–146)
TRIGL SERPL-MCNC: 90 MG/DL
TSH SERPL-ACNC: 2.75 MIU/L (ref 0.4–4.5)
VIT B12 SERPL-MCNC: 589 PG/ML (ref 200–1100)
WBC # BLD AUTO: 4.6 THOUSAND/UL (ref 3.8–10.8)

## 2025-02-11 NOTE — TELEPHONE ENCOUNTER
----- Message from Surya Brewer sent at 2/11/2025  3:32 PM EST -----  Needs 3 hr hydration pre cath

## 2025-02-13 ENCOUNTER — TELEPHONE (OUTPATIENT)
Dept: CARDIOLOGY | Facility: HOSPITAL | Age: 65
End: 2025-02-13
Payer: COMMERCIAL

## 2025-02-13 RX ORDER — INSULIN GLARGINE-YFGN 100 [IU]/ML
INJECTION, SOLUTION SUBCUTANEOUS EVERY 24 HOURS
Status: ON HOLD | COMMUNITY
End: 2025-03-05 | Stop reason: SDUPTHER

## 2025-02-13 NOTE — TELEPHONE ENCOUNTER
Called her back to let her know to bring an extra glucose monitoring button to apply post procedure.  She verbalized understanding.

## 2025-02-13 NOTE — TELEPHONE ENCOUNTER
She is calling to verify her instructions for upcoming procedure: left heart catheterization    She is concerned about having such a late procedure and having an issue with hypoglycemia.  Discussed with cath lab nurses.  They will monitor her glucose and can give her IV glucose.  Reviewed complete instructions again.    Reviewed date 2/14/25 and arrival time 9:30.        Labs up to date? Labs have been reviewed - will be getting 3 hours hydration.    Should hydration be considered or has it been ordered? (GFR <50, elderly, DDM)    Does patient require dye prep?  Patient does not require dye prep.  Has had dye in the past with no reaction.    Check for medications that need to be held:    Anticoagulation: Patient denies taking.    Diabetic medications:   Jardiance is on her medication list but she says she has not started that and does not intend to until consulting with nephrology.  She is asking to have this taken off her list.  I did let her know that would cancel her prescription and she would need to get a new on e if she is going to start taking it.    Insulin - take half dose of your long-acting insulin - she takes it in the morning.   Hold your short acting insulin the morning of the procedure.    Weight loss medications: Patient denies taking.    Nothing to eat or drink after midnight except isosorbide and Cymbalta with a sip of water the morning of the cath.    You will need a responsible adult .    Bring your photo ID, insurance cards and either a perfect list of the medications you are swallowing or the medications in original bottles.    Wear loose, comfortable clothing.    There is a possibility of staying over night for observation so make arrangements and pack a bag with necessities for that just incase.      Patient correctly repeated instructions, verbalized understanding and is agreeable to the plan.

## 2025-02-13 NOTE — TELEPHONE ENCOUNTER
Called patient at this time and LVM about procedure time of 12:30 am and an arrival time of 9:30am due to three hour hydration for 2/14.      Thank you!  Miller MCGINNIS

## 2025-02-14 ENCOUNTER — APPOINTMENT (OUTPATIENT)
Dept: CARDIOLOGY | Facility: HOSPITAL | Age: 65
End: 2025-02-14
Payer: COMMERCIAL

## 2025-02-14 ENCOUNTER — HOSPITAL ENCOUNTER (OUTPATIENT)
Facility: HOSPITAL | Age: 65
Setting detail: OBSERVATION
Discharge: HOME | End: 2025-02-15
Attending: INTERNAL MEDICINE | Admitting: INTERNAL MEDICINE
Payer: COMMERCIAL

## 2025-02-14 DIAGNOSIS — R07.9 CHEST PAIN, UNSPECIFIED TYPE: Primary | ICD-10-CM

## 2025-02-14 DIAGNOSIS — I20.9 ANGINA PECTORIS, UNSPECIFIED: ICD-10-CM

## 2025-02-14 PROBLEM — I20.89 STABLE ANGINA (CMS-HCC): Status: ACTIVE | Noted: 2025-02-14

## 2025-02-14 LAB — GLUCOSE BLD MANUAL STRIP-MCNC: 203 MG/DL (ref 74–99)

## 2025-02-14 PROCEDURE — 93459 L HRT ART/GRFT ANGIO: CPT | Performed by: INTERNAL MEDICINE

## 2025-02-14 PROCEDURE — 2780000003 HC OR 278 NO HCPCS: Performed by: INTERNAL MEDICINE

## 2025-02-14 PROCEDURE — C1894 INTRO/SHEATH, NON-LASER: HCPCS | Performed by: INTERNAL MEDICINE

## 2025-02-14 PROCEDURE — 99153 MOD SED SAME PHYS/QHP EA: CPT | Performed by: INTERNAL MEDICINE

## 2025-02-14 PROCEDURE — 99152 MOD SED SAME PHYS/QHP 5/>YRS: CPT | Performed by: INTERNAL MEDICINE

## 2025-02-14 PROCEDURE — 2500000002 HC RX 250 W HCPCS SELF ADMINISTERED DRUGS (ALT 637 FOR MEDICARE OP, ALT 636 FOR OP/ED): Performed by: INTERNAL MEDICINE

## 2025-02-14 PROCEDURE — 96374 THER/PROPH/DIAG INJ IV PUSH: CPT | Mod: 59

## 2025-02-14 PROCEDURE — 2500000004 HC RX 250 GENERAL PHARMACY W/ HCPCS (ALT 636 FOR OP/ED): Performed by: INTERNAL MEDICINE

## 2025-02-14 PROCEDURE — 2500000004 HC RX 250 GENERAL PHARMACY W/ HCPCS (ALT 636 FOR OP/ED): Performed by: NURSE PRACTITIONER

## 2025-02-14 PROCEDURE — 93005 ELECTROCARDIOGRAM TRACING: CPT | Mod: 59

## 2025-02-14 PROCEDURE — 82947 ASSAY GLUCOSE BLOOD QUANT: CPT

## 2025-02-14 PROCEDURE — 99222 1ST HOSP IP/OBS MODERATE 55: CPT | Performed by: STUDENT IN AN ORGANIZED HEALTH CARE EDUCATION/TRAINING PROGRAM

## 2025-02-14 PROCEDURE — 96361 HYDRATE IV INFUSION ADD-ON: CPT | Mod: 59

## 2025-02-14 PROCEDURE — C1760 CLOSURE DEV, VASC: HCPCS | Performed by: INTERNAL MEDICINE

## 2025-02-14 PROCEDURE — 2720000007 HC OR 272 NO HCPCS: Performed by: INTERNAL MEDICINE

## 2025-02-14 PROCEDURE — G0378 HOSPITAL OBSERVATION PER HR: HCPCS

## 2025-02-14 PROCEDURE — 2550000001 HC RX 255 CONTRASTS: Performed by: INTERNAL MEDICINE

## 2025-02-14 PROCEDURE — 2500000002 HC RX 250 W HCPCS SELF ADMINISTERED DRUGS (ALT 637 FOR MEDICARE OP, ALT 636 FOR OP/ED): Performed by: STUDENT IN AN ORGANIZED HEALTH CARE EDUCATION/TRAINING PROGRAM

## 2025-02-14 PROCEDURE — 2500000001 HC RX 250 WO HCPCS SELF ADMINISTERED DRUGS (ALT 637 FOR MEDICARE OP)

## 2025-02-14 PROCEDURE — 2500000001 HC RX 250 WO HCPCS SELF ADMINISTERED DRUGS (ALT 637 FOR MEDICARE OP): Performed by: INTERNAL MEDICINE

## 2025-02-14 RX ORDER — ATENOLOL 25 MG/1
50 TABLET ORAL DAILY
Status: DISCONTINUED | OUTPATIENT
Start: 2025-02-15 | End: 2025-02-15 | Stop reason: HOSPADM

## 2025-02-14 RX ORDER — DEXTROSE 50 % IN WATER (D50W) INTRAVENOUS SYRINGE
25
Status: DISCONTINUED | OUTPATIENT
Start: 2025-02-14 | End: 2025-02-15 | Stop reason: HOSPADM

## 2025-02-14 RX ORDER — DULOXETIN HYDROCHLORIDE 20 MG/1
20 CAPSULE, DELAYED RELEASE ORAL 2 TIMES DAILY
Status: DISCONTINUED | OUTPATIENT
Start: 2025-02-14 | End: 2025-02-15 | Stop reason: HOSPADM

## 2025-02-14 RX ORDER — ACETAMINOPHEN 160 MG/5ML
650 SOLUTION ORAL EVERY 6 HOURS PRN
Status: DISCONTINUED | OUTPATIENT
Start: 2025-02-14 | End: 2025-02-14

## 2025-02-14 RX ORDER — LEVOTHYROXINE SODIUM 50 UG/1
50 TABLET ORAL DAILY
Status: DISCONTINUED | OUTPATIENT
Start: 2025-02-14 | End: 2025-02-15 | Stop reason: HOSPADM

## 2025-02-14 RX ORDER — SODIUM CHLORIDE 9 MG/ML
100 INJECTION, SOLUTION INTRAVENOUS CONTINUOUS
Status: ACTIVE | OUTPATIENT
Start: 2025-02-14 | End: 2025-02-14

## 2025-02-14 RX ORDER — INSULIN GLARGINE 100 [IU]/ML
12 INJECTION, SOLUTION SUBCUTANEOUS EVERY MORNING
Status: DISCONTINUED | OUTPATIENT
Start: 2025-02-15 | End: 2025-02-15 | Stop reason: HOSPADM

## 2025-02-14 RX ORDER — ONDANSETRON HYDROCHLORIDE 2 MG/ML
4 INJECTION, SOLUTION INTRAVENOUS EVERY 4 HOURS PRN
Status: DISCONTINUED | OUTPATIENT
Start: 2025-02-14 | End: 2025-02-15 | Stop reason: HOSPADM

## 2025-02-14 RX ORDER — LISINOPRIL 20 MG/1
40 TABLET ORAL DAILY
Status: DISCONTINUED | OUTPATIENT
Start: 2025-02-14 | End: 2025-02-15 | Stop reason: HOSPADM

## 2025-02-14 RX ORDER — ISOSORBIDE MONONITRATE 30 MG/1
30 TABLET, EXTENDED RELEASE ORAL DAILY
Status: DISCONTINUED | OUTPATIENT
Start: 2025-02-15 | End: 2025-02-15 | Stop reason: HOSPADM

## 2025-02-14 RX ORDER — LIDOCAINE HYDROCHLORIDE 20 MG/ML
INJECTION, SOLUTION INFILTRATION; PERINEURAL AS NEEDED
Status: DISCONTINUED | OUTPATIENT
Start: 2025-02-14 | End: 2025-02-14 | Stop reason: HOSPADM

## 2025-02-14 RX ORDER — ASPIRIN 325 MG
325 TABLET ORAL DAILY
Status: DISCONTINUED | OUTPATIENT
Start: 2025-02-14 | End: 2025-02-15 | Stop reason: HOSPADM

## 2025-02-14 RX ORDER — HYDRALAZINE HYDROCHLORIDE 20 MG/ML
INJECTION INTRAMUSCULAR; INTRAVENOUS AS NEEDED
Status: DISCONTINUED | OUTPATIENT
Start: 2025-02-14 | End: 2025-02-14 | Stop reason: HOSPADM

## 2025-02-14 RX ORDER — INSULIN LISPRO 100 [IU]/ML
0-10 INJECTION, SOLUTION INTRAVENOUS; SUBCUTANEOUS
Status: DISCONTINUED | OUTPATIENT
Start: 2025-02-14 | End: 2025-02-15 | Stop reason: HOSPADM

## 2025-02-14 RX ORDER — FENTANYL CITRATE 50 UG/ML
INJECTION, SOLUTION INTRAMUSCULAR; INTRAVENOUS AS NEEDED
Status: DISCONTINUED | OUTPATIENT
Start: 2025-02-14 | End: 2025-02-14 | Stop reason: HOSPADM

## 2025-02-14 RX ORDER — MIDAZOLAM HYDROCHLORIDE 1 MG/ML
INJECTION, SOLUTION INTRAMUSCULAR; INTRAVENOUS AS NEEDED
Status: DISCONTINUED | OUTPATIENT
Start: 2025-02-14 | End: 2025-02-14 | Stop reason: HOSPADM

## 2025-02-14 RX ORDER — SODIUM CHLORIDE 9 MG/ML
100 INJECTION, SOLUTION INTRAVENOUS CONTINUOUS
Status: DISCONTINUED | OUTPATIENT
Start: 2025-02-14 | End: 2025-02-15 | Stop reason: HOSPADM

## 2025-02-14 RX ORDER — ACETAMINOPHEN 650 MG/1
650 SUPPOSITORY RECTAL EVERY 6 HOURS PRN
Status: DISCONTINUED | OUTPATIENT
Start: 2025-02-14 | End: 2025-02-14

## 2025-02-14 RX ORDER — ATENOLOL 25 MG/1
50 TABLET ORAL DAILY
Status: DISCONTINUED | OUTPATIENT
Start: 2025-02-14 | End: 2025-02-14

## 2025-02-14 RX ORDER — EZETIMIBE 10 MG/1
10 TABLET ORAL DAILY
Status: DISCONTINUED | OUTPATIENT
Start: 2025-02-14 | End: 2025-02-15 | Stop reason: HOSPADM

## 2025-02-14 RX ORDER — ACETAMINOPHEN 325 MG/1
650 TABLET ORAL EVERY 4 HOURS PRN
Status: DISCONTINUED | OUTPATIENT
Start: 2025-02-14 | End: 2025-02-15 | Stop reason: HOSPADM

## 2025-02-14 RX ORDER — DEXTROSE 50 % IN WATER (D50W) INTRAVENOUS SYRINGE
12.5
Status: DISCONTINUED | OUTPATIENT
Start: 2025-02-14 | End: 2025-02-15 | Stop reason: HOSPADM

## 2025-02-14 RX ORDER — ACETAMINOPHEN 325 MG/1
650 TABLET ORAL EVERY 6 HOURS PRN
Status: DISCONTINUED | OUTPATIENT
Start: 2025-02-14 | End: 2025-02-14

## 2025-02-14 RX ADMIN — ASPIRIN 325 MG: 325 TABLET ORAL at 21:19

## 2025-02-14 RX ADMIN — ACETAMINOPHEN 650 MG: 325 TABLET ORAL at 18:44

## 2025-02-14 RX ADMIN — SODIUM CHLORIDE 100 ML/HR: 9 INJECTION, SOLUTION INTRAVENOUS at 10:16

## 2025-02-14 RX ADMIN — INSULIN LISPRO 4 UNITS: 100 INJECTION, SOLUTION INTRAVENOUS; SUBCUTANEOUS at 21:28

## 2025-02-14 RX ADMIN — LISINOPRIL 40 MG: 20 TABLET ORAL at 21:19

## 2025-02-14 RX ADMIN — SODIUM CHLORIDE 100 ML/HR: 9 INJECTION, SOLUTION INTRAVENOUS at 22:45

## 2025-02-14 RX ADMIN — ONDANSETRON 4 MG: 2 INJECTION INTRAMUSCULAR; INTRAVENOUS at 18:50

## 2025-02-14 RX ADMIN — LEVOTHYROXINE SODIUM 50 MCG: 0.05 TABLET ORAL at 21:19

## 2025-02-14 RX ADMIN — DULOXETINE 20 MG: 20 CAPSULE, DELAYED RELEASE ORAL at 21:19

## 2025-02-14 RX ADMIN — EZETIMIBE 10 MG: 10 TABLET ORAL at 21:19

## 2025-02-14 SDOH — SOCIAL STABILITY: SOCIAL INSECURITY
WITHIN THE LAST YEAR, HAVE YOU BEEN RAPED OR FORCED TO HAVE ANY KIND OF SEXUAL ACTIVITY BY YOUR PARTNER OR EX-PARTNER?: NO

## 2025-02-14 SDOH — HEALTH STABILITY: PHYSICAL HEALTH
HOW OFTEN DO YOU NEED TO HAVE SOMEONE HELP YOU WHEN YOU READ INSTRUCTIONS, PAMPHLETS, OR OTHER WRITTEN MATERIAL FROM YOUR DOCTOR OR PHARMACY?: NEVER

## 2025-02-14 SDOH — SOCIAL STABILITY: SOCIAL INSECURITY: ARE THERE ANY APPARENT SIGNS OF INJURIES/BEHAVIORS THAT COULD BE RELATED TO ABUSE/NEGLECT?: NO

## 2025-02-14 SDOH — SOCIAL STABILITY: SOCIAL INSECURITY: DOES ANYONE TRY TO KEEP YOU FROM HAVING/CONTACTING OTHER FRIENDS OR DOING THINGS OUTSIDE YOUR HOME?: NO

## 2025-02-14 SDOH — ECONOMIC STABILITY: HOUSING INSECURITY: DO YOU FEEL UNSAFE GOING BACK TO THE PLACE WHERE YOU LIVE?: NO

## 2025-02-14 SDOH — SOCIAL STABILITY: SOCIAL INSECURITY: HAVE YOU HAD ANY THOUGHTS OF HARMING ANYONE ELSE?: NO

## 2025-02-14 SDOH — SOCIAL STABILITY: SOCIAL INSECURITY: HAS ANYONE EVER THREATENED TO HURT YOUR FAMILY OR YOUR PETS?: NO

## 2025-02-14 SDOH — ECONOMIC STABILITY: FOOD INSECURITY: WITHIN THE PAST 12 MONTHS, THE FOOD YOU BOUGHT JUST DIDN'T LAST AND YOU DIDN'T HAVE MONEY TO GET MORE.: NEVER TRUE

## 2025-02-14 SDOH — SOCIAL STABILITY: SOCIAL INSECURITY: HAVE YOU HAD THOUGHTS OF HARMING ANYONE ELSE?: NO

## 2025-02-14 SDOH — ECONOMIC STABILITY: INCOME INSECURITY: IN THE PAST 12 MONTHS HAS THE ELECTRIC, GAS, OIL, OR WATER COMPANY THREATENED TO SHUT OFF SERVICES IN YOUR HOME?: NO

## 2025-02-14 SDOH — SOCIAL STABILITY: SOCIAL INSECURITY: DO YOU FEEL UNSAFE GOING BACK TO THE PLACE WHERE YOU ARE LIVING?: NO

## 2025-02-14 SDOH — ECONOMIC STABILITY: FOOD INSECURITY: WITHIN THE PAST 12 MONTHS, YOU WORRIED THAT YOUR FOOD WOULD RUN OUT BEFORE YOU GOT THE MONEY TO BUY MORE.: NEVER TRUE

## 2025-02-14 SDOH — SOCIAL STABILITY: SOCIAL INSECURITY: DO YOU FEEL ANYONE HAS EXPLOITED OR TAKEN ADVANTAGE OF YOU FINANCIALLY OR OF YOUR PERSONAL PROPERTY?: NO

## 2025-02-14 SDOH — ECONOMIC STABILITY: HOUSING INSECURITY: AT ANY TIME IN THE PAST 12 MONTHS, WERE YOU HOMELESS OR LIVING IN A SHELTER (INCLUDING NOW)?: NO

## 2025-02-14 SDOH — ECONOMIC STABILITY: TRANSPORTATION INSECURITY: IN THE PAST 12 MONTHS, HAS LACK OF TRANSPORTATION KEPT YOU FROM MEDICAL APPOINTMENTS OR FROM GETTING MEDICATIONS?: NO

## 2025-02-14 SDOH — SOCIAL STABILITY: SOCIAL INSECURITY: WITHIN THE LAST YEAR, HAVE YOU BEEN AFRAID OF YOUR PARTNER OR EX-PARTNER?: NO

## 2025-02-14 SDOH — SOCIAL STABILITY: SOCIAL INSECURITY: WITHIN THE LAST YEAR, HAVE YOU BEEN HUMILIATED OR EMOTIONALLY ABUSED IN OTHER WAYS BY YOUR PARTNER OR EX-PARTNER?: NO

## 2025-02-14 SDOH — SOCIAL STABILITY: SOCIAL INSECURITY
WITHIN THE LAST YEAR, HAVE YOU BEEN KICKED, HIT, SLAPPED, OR OTHERWISE PHYSICALLY HURT BY YOUR PARTNER OR EX-PARTNER?: NO

## 2025-02-14 SDOH — SOCIAL STABILITY: SOCIAL INSECURITY
ASK PARENT OR GUARDIAN: ARE THERE TIMES WHEN YOU, YOUR CHILD(REN), OR ANY MEMBER OF YOUR HOUSEHOLD FEEL UNSAFE, HARMED, OR THREATENED AROUND PERSONS WITH WHOM YOU KNOW OR LIVE?: NO

## 2025-02-14 SDOH — ECONOMIC STABILITY: FOOD INSECURITY: HOW HARD IS IT FOR YOU TO PAY FOR THE VERY BASICS LIKE FOOD, HOUSING, MEDICAL CARE, AND HEATING?: NOT HARD AT ALL

## 2025-02-14 SDOH — ECONOMIC STABILITY: HOUSING INSECURITY: IN THE LAST 12 MONTHS, WAS THERE A TIME WHEN YOU WERE NOT ABLE TO PAY THE MORTGAGE OR RENT ON TIME?: NO

## 2025-02-14 SDOH — SOCIAL STABILITY: SOCIAL INSECURITY: ABUSE: ADULT

## 2025-02-14 SDOH — ECONOMIC STABILITY: HOUSING INSECURITY: IN THE PAST 12 MONTHS, HOW MANY TIMES HAVE YOU MOVED WHERE YOU WERE LIVING?: 1

## 2025-02-14 SDOH — SOCIAL STABILITY: SOCIAL INSECURITY: ARE YOU OR HAVE YOU BEEN THREATENED OR ABUSED PHYSICALLY, EMOTIONALLY, OR SEXUALLY BY ANYONE?: NO

## 2025-02-14 SDOH — SOCIAL STABILITY: SOCIAL INSECURITY: WERE YOU ABLE TO COMPLETE ALL THE BEHAVIORAL HEALTH SCREENINGS?: YES

## 2025-02-14 ASSESSMENT — ACTIVITIES OF DAILY LIVING (ADL)
DRESSING YOURSELF: INDEPENDENT
PATIENT'S MEMORY ADEQUATE TO SAFELY COMPLETE DAILY ACTIVITIES?: YES
HEARING - RIGHT EAR: FUNCTIONAL
LACK_OF_TRANSPORTATION: NO
GROOMING: INDEPENDENT
HEARING - LEFT EAR: FUNCTIONAL
JUDGMENT_ADEQUATE_SAFELY_COMPLETE_DAILY_ACTIVITIES: YES
FEEDING YOURSELF: INDEPENDENT
LACK_OF_TRANSPORTATION: NO
TOILETING: INDEPENDENT
ADEQUATE_TO_COMPLETE_ADL: YES
WALKS IN HOME: INDEPENDENT
BATHING: INDEPENDENT

## 2025-02-14 ASSESSMENT — COGNITIVE AND FUNCTIONAL STATUS - GENERAL
DAILY ACTIVITIY SCORE: 24
PATIENT BASELINE BEDBOUND: NO
MOBILITY SCORE: 24

## 2025-02-14 ASSESSMENT — PAIN SCALES - GENERAL
PAINLEVEL_OUTOF10: 0 - NO PAIN
PAINLEVEL_OUTOF10: 2

## 2025-02-14 ASSESSMENT — LIFESTYLE VARIABLES
HOW MANY STANDARD DRINKS CONTAINING ALCOHOL DO YOU HAVE ON A TYPICAL DAY: PATIENT DOES NOT DRINK
HOW OFTEN DO YOU HAVE A DRINK CONTAINING ALCOHOL: NEVER
HOW OFTEN DO YOU HAVE 6 OR MORE DRINKS ON ONE OCCASION: NEVER
AUDIT-C TOTAL SCORE: 0
AUDIT-C TOTAL SCORE: 0
SKIP TO QUESTIONS 9-10: 1

## 2025-02-14 ASSESSMENT — PATIENT HEALTH QUESTIONNAIRE - PHQ9
2. FEELING DOWN, DEPRESSED OR HOPELESS: NOT AT ALL
SUM OF ALL RESPONSES TO PHQ9 QUESTIONS 1 & 2: 0
1. LITTLE INTEREST OR PLEASURE IN DOING THINGS: NOT AT ALL

## 2025-02-14 ASSESSMENT — PAIN - FUNCTIONAL ASSESSMENT
PAIN_FUNCTIONAL_ASSESSMENT: 0-10
PAIN_FUNCTIONAL_ASSESSMENT: 0-10

## 2025-02-14 NOTE — PRE-SEDATION DOCUMENTATION
Patient: Jeanine Ghosh  MRN: [id number]    Pre-sedation Evaluation:  History of Present Illness:      Past Medical History:   Diagnosis Date    Acute URI 03/27/2023    Acute URI 05/23/2024    Anxiety disorder, unspecified 11/24/2013    Anxiety disorder    Arteriosclerosis of coronary artery 05/23/2024    Atherosclerosis of coronary artery 07/20/2009    Bronchitis 05/23/2024    Bronchitis 05/23/2024    Chronic lymphocytic thyroiditis 05/23/2024    COVID-19 virus infection 03/27/2023    COVID-19 virus infection 05/23/2024    Encounter for screening for malignant neoplasm of colon 06/03/2016    Screening for colorectal cancer    Exposure to severe acute respiratory syndrome coronavirus 2 (SARS-CoV-2) 05/23/2024    Hearing loss of left ear 05/23/2024    Herpesviral infection, unspecified 12/07/2021    HSV-1 (herpes simplex virus 1) infection    Left upper quadrant pain 02/06/2017    Left upper quadrant abdominal pain of unknown etiology    Low back pain, unspecified 11/24/2013    Lumbago    Mixed hyperlipidemia 05/23/2024    Other age-related incipient cataract, unspecified eye     Other age-related incipient cataract    Other enthesopathies, not elsewhere classified 11/24/2013    Tendonitis of shoulder    Pain in unspecified joint 12/09/2021    Arthralgia of multiple sites    Pain in unspecified joint 01/06/2017    Arthralgia, unspecified joint    Polyp of colon 11/24/2013    Benign colonic polyp    Primary osteoarthritis, unspecified hand 11/24/2013    Osteoarthritis, hand    Proteinuria, unspecified 02/21/2014    Proteinuria    Pure hypercholesterolemia 05/23/2024    Comment on above: Added by Problem List Migration; 2013-11-24;    Radiculopathy, cervical region 07/30/2015    Cervical radiculopathy    Reactive airway disease ([facility]-HCC) 05/23/2024    Spinal stenosis, cervical region 07/30/2015    Stenosis of cervical spine with myelopathy    Type 1 diabetes mellitus without complications 08/22/2022    Diabetes type 1,  controlled    Viral illness 03/27/2023    Viral illness 05/23/2024       Principle problems:  Patient Active Problem List    Diagnosis Date Noted    Other chest pain 01/10/2025    Anxiety disorder 05/23/2024    Benign colonic polyp 05/23/2024    Osteoarthritis of hand 05/23/2024    Overweight with body mass index (BMI) of 28 to 28.9 in adult 05/23/2024    Senile incipient cataract 05/23/2024    Disorder of tendon of shoulder region 05/23/2024    Tendinitis of shoulder 05/23/2024    Stage 3a chronic kidney disease (Multi) 02/05/2024    Labral tear of long head of biceps tendon, left, initial encounter 08/04/2023    Arteriosclerotic coronary artery disease 03/27/2023    Arthralgia of multiple sites 03/27/2023    Benign essential hypertension 03/27/2023    Bilateral high frequency sensorineural hearing loss 03/27/2023    Cervical radiculopathy 03/27/2023    Stenosis of cervical spine with myelopathy 03/27/2023    Lumbar stenosis with neurogenic claudication 03/27/2023    Colicky RUQ abdominal pain 03/27/2023    Fibromyalgia 03/27/2023    Hashimoto's thyroiditis 03/27/2023    Hearing loss, left 03/27/2023    Herpes zoster 03/27/2023    High cholesterol 03/27/2023    HSV-1 (herpes simplex virus 1) infection 03/27/2023    Lumbar compression fracture (Multi) 03/27/2023    Microalbuminuric diabetic nephropathy (Multi) 03/27/2023    Rib pain 03/27/2023    Reactive airway disease with wheezing ([facility]) 03/27/2023    Thoracic radiculitis 03/27/2023    Thoracic back pain 03/27/2023    Diabetes type 1, controlled (Multi) 03/27/2023    Type 1 diabetes mellitus with microalbuminuria, with long-term current use of insulin (Multi) 03/27/2023    S/P CABG x 2 07/05/2017    Chest pain 01/27/2025     Allergies:  Allergies   Allergen Reactions    Animal Dander Shortness of breath     especially cats    Morphine Itching and Swelling     PTA/Current Medications:  Medications Prior to Admission   Medication Sig Dispense Refill Last  Dose/Taking    aspirin 325 mg tablet Take 1 tablet (325 mg) by mouth once daily.   2/13/2025    atenolol (Tenormin) 50 mg tablet Take 1 tablet (50 mg) by mouth once daily. as directed 30 tablet 1 2/13/2025    DULoxetine (Cymbalta) 20 mg DR capsule Take 1 capsule (20 mg) by mouth 2 times a day. Do not crush or chew. 180 capsule 3 2/13/2025    ezetimibe (Zetia) 10 mg tablet Take 1 tablet (10 mg) by mouth once daily. 90 tablet 3 2/13/2025    flash glucose sensor kit (FreeStyle Mando 2 Sensor) kit For continuous glucose monitoring.  Cool every 14 days 6 each 3 2/14/2025 Morning    insulin aspart (NovoLOG) 100 unit/mL (3 mL) pen Inject 1 unit for every 10 grams of carb.  Max of 60 units per day 30 mL 5 2/13/2025    insulin glargine-yfgn (Semglee,insulin glargine-yfgn,) 100 unit/mL vial Inject under the skin once every 24 hours. Take as directed per insulin instructions.   2/14/2025 Morning    isosorbide mononitrate ER (Imdur) 30 mg 24 hr tablet Take 1 tablet (30 mg) by mouth once daily. Do not crush or chew. 30 tablet 1 2/14/2025 Morning    levothyroxine (Synthroid, Levoxyl) 50 mcg tablet Take 1 tablet (50 mcg) by mouth once daily. Take on an empty stomach. 90 tablet 3 2/13/2025    lisinopril 40 mg tablet TAKE ONE TABLET BY MOUTH EVERY DAY 90 tablet 1 2/13/2025    Semglee,insulin glarg-yfgn,Pen 100 unit/mL (3 mL) Pen Inject 12 Units under the skin once daily in the morning. 15 mL 5 2/13/2025     Current Facility-Administered Medications   Medication Dose Route Frequency Provider Last Rate Last Admin    oxygen (O2) therapy   inhalation Continuous PRN - O2/gases Judith Soares, APRN-CNP         Past Surgical History:   has a past surgical history that includes Other surgical history (06/15/2022); Other surgical history (08/26/2019); Other surgical history (08/26/2019); Other surgical history (08/26/2019); Other surgical history (08/26/2019); Coronary artery bypass graft (03/13/2018); and Other surgical history  (07/07/2022).    Physical Exam    Airway  Mallampati: II     Cardiovascular    Dental    Pulmonary        Plan

## 2025-02-14 NOTE — POST-PROCEDURE NOTE
Physician Transition of Care Summary  Invasive Cardiovascular Lab    Procedure Date: 2/14/2025  Attending:    * Surya Brewer - Primary  Resident/Fellow/Other Assistant: Surgeons and Role:  * No surgeons found with a matching role *    Indications:   Pre-op Diagnosis      * Chest pain, unspecified type [R07.9]    Post-procedure diagnosis:   Post-op Diagnosis     * Chest pain, unspecified type [R07.9]    Procedure(s):   Left Heart Cath  22392 - ID CATH PLMT L HRT & ARTS W/NJX & ANGIO IMG S&I        Procedure Findings:   Left heart cath  LVEDP - 18mmHg  Right dominant system  Severe calcific distal right stenosis - would need rotablation - for staged PCI in the main campus   Diffuse LAD stenosis, Lcx- diffuse moderate disease    Grafts  LIMA - occluded  Radial graft to OM - patent     Description of the Procedure:   RFA  6Fr angioseal     Complications:   Nil    Stents/Implants:   Implants       No implant documentation for this case.            Anticoagulation/Antiplatelet Plan:   Nil     Estimated Blood Loss:   5 mL    Anesthesia: Moderate Sedation Anesthesia Staff: No anesthesia staff entered.    Any Specimen(s) Removed:   No specimens collected during this procedure.    Disposition:   [facility]  Dc tomorrow      Electronically signed by: Moises Aranda MD, 2/14/2025 4:43 PM

## 2025-02-14 NOTE — CONSULTS
"Inpatient consult to Medicine  Consult performed by: Radha Marquez PA-C  Consult ordered by: Surya Brewer MD        [facility] General Medicine Consultation Note    ASSESSMENT and PLAN:     Jeanine Ghosh is a 64 y.o. female with past medical history s/f HTN, HLD, CAD s/p CABG (1997), T1DM, CKD, hypothyroidism, anxiety, hearing loss (wears hearing aids), who was admitted after undergoing heart cath for evaluation of chest pain on 02/14/2025. Medicine was consulted for medical management.     Chest pain with h/o CABG s/p CABG (1997) now s/p heart cath (02/14/2025)   -Cath findings:    \"LVEDP - 18mmHg  Right dominant system  Severe calcific distal right stenosis - would need rotablation - for staged PCI in the main campus   Diffuse LAD stenosis, Lcx- diffuse moderate disease     Grafts  LIMA - occluded  Radial graft to OM - patent\"   -NVS: Intact  -Continue current therapies   -Monitor on tele   -DVT Prophylaxis: as per primary service    HTN, HLD   -Continued on home therapies   -Adjust as needed    IDDM   -Continue home basal insulin at current dosage (takes in AM)   -Continue with SSI   -Continue with accucheks, hypoglycemic protocol   -Monitor and adjust as needed      CKD   -On admission, near/at baseline   -Continue to monitor while admitted      Hypothyroidism   -Continue home synthroid     Anxiety   -Continue home duloxetine     Other:   -I have reviewed the patient's chart and documentation/notes from primary care, cardiology, and endocrinology in the inpatient and/or outpatient setting   -I have reviewed the patient's most recent lab values in the chart  -I have reconciled the patient's outside information and chart during this consultation, including: allergies, medications (confirmed at bedside as well), prior diagnoses, and immunizations.        Radha Marquez PA-C  General Inpatient Medicine (\"IMS\") / Hospitalist Service  Available via "[employer]"t [phone]. Outside of these hours, please " contact providers assigned to the team and/or via the Medicine Acute Pager.  I spent a total of 65 minutes in the overall professional care of this patient on this date of service.    Dragon dictation software was used to dictate this note and thus there may be minor errors in translation/transcription including garbled speech or misspellings. Please contact for clarification if needed.    HISTORY OF PRESENT ILLNESS:     History Of Present Illness:    Jeanine Ghosh is a 64 y.o. female with a significant past medical history of HTN, HLD, CAD s/p CABG (1997), T1DM, CKD, hypothyroidism, anxiety, hearing loss (wears hearing aids), who was admitted after undergoing heart cath for evaluation of chest pain on 02/14/2025. Medicine was consulted for medical management. Patient seen and examined, overall no specific complaints presently. Plan is for discharge in the morning per cardiology and continued home therapies (atenolol, Imdur) with further follow-up as directed (?CMC). Overall doing well post-cath. Denies current cp/pressure, palpitations, diaphoresis, SOB, MARCOS, dizziness / lightheadedness, syncope or near syncope, HA, vision changes, f/c/n/v/d/abd pain. Aware of hospitalist service presence in house overnight if questions or concerns arise.     Review of Systems:   I performed a complete 12 point review of systems and it is negative except as noted in HPI or above. All other systems have been reviewed and are negative.    PAST HISTORIES:     Past Medical History:  She has a past medical history of Acute URI (03/27/2023), Acute URI (05/23/2024), Anxiety disorder, unspecified (11/24/2013), Arteriosclerosis of coronary artery (05/23/2024), Atherosclerosis of coronary artery (07/20/2009), Bronchitis (05/23/2024), Bronchitis (05/23/2024), Chronic lymphocytic thyroiditis (05/23/2024), COVID-19 virus infection (03/27/2023), COVID-19 virus infection (05/23/2024), Encounter for screening for malignant neoplasm of colon  "(06/03/2016), Exposure to severe acute respiratory syndrome coronavirus 2 (SARS-CoV-2) (05/23/2024), Hearing loss of left ear (05/23/2024), Herpesviral infection, unspecified (12/07/2021), Left upper quadrant pain (02/06/2017), Low back pain, unspecified (11/24/2013), Mixed hyperlipidemia (05/23/2024), Other age-related incipient cataract, unspecified eye, Other enthesopathies, not elsewhere classified (11/24/2013), Pain in unspecified joint (12/09/2021), Pain in unspecified joint (01/06/2017), Polyp of colon (11/24/2013), Primary osteoarthritis, unspecified hand (11/24/2013), Proteinuria, unspecified (02/21/2014), Pure hypercholesterolemia (05/23/2024), Radiculopathy, cervical region (07/30/2015), Reactive airway disease ([facility]) (05/23/2024), Spinal stenosis, cervical region (07/30/2015), Type 1 diabetes mellitus without complications (08/22/2022), Viral illness (03/27/2023), and Viral illness (05/23/2024).    Past Surgical History:  She has a past surgical history that includes Other surgical history (06/15/2022); Other surgical history (08/26/2019); Other surgical history (08/26/2019); Other surgical history (08/26/2019); Other surgical history (08/26/2019); Coronary artery bypass graft (03/13/2018); and Other surgical history (07/07/2022).      Social History:  She reports that she has never smoked. She has never been exposed to tobacco smoke. She has never used smokeless tobacco. She reports that she does not currently use alcohol. She reports that she does not currently use drugs.    Family History:  Family History   Problem Relation Name Age of Onset    COPD Brother          Allergies:  Animal dander and Morphine    OBJECTIVE:     Last Recorded Vitals:  Vitals:    02/14/25 1019 02/14/25 1602 02/14/25 1638   BP: 151/71     Pulse: 56     Resp: 18     Temp: 36.9 °C (98.4 °F)     TempSrc: Temporal     SpO2: 98% 100% 100%   Weight: 70.3 kg (155 lb)     Height: 1.575 m (5' 2.01\")       /71   Pulse 56   " "Temp 36.9 °C (98.4 °F) (Temporal)   Resp 18   Ht 1.575 m (5' 2.01\")   Wt 70.3 kg (155 lb)   SpO2 100%   BMI 28.34 kg/m²      Physical Exam:  Vital signs and nursing notes reviewed.   Constitutional: Pleasant and cooperative. Laying in bed in no acute distress. Conversant.   Skin: Warm and dry; no obvious lesions, rashes, pallor, or jaundice.   Eyes: EOMI. Anicteric sclera.   ENT: Mucous membranes moist; no obvious injury or deformity appreciated.   Head and Neck: Normocephalic, atraumatic. ROM preserved. Trachea midline.   Respiratory: Nonlabored on RA. Lungs clear to auscultation bilaterally without obvious adventitious sounds. Chest rise is equal.  Cardiovascular: RRR. No gross murmur, gallop, or rub. Extremities are warm and well-perfused. No chest wall tenderness.   GI: Abdomen soft, nontender, nondistended.   : No CVA tenderness.   MSK: No gross abnormalities appreciated. No limitations to AROM/PROM appreciated.   Extremities: No cyanosis, edema, or clubbing evident. Neurovascularly intact.   Neuro: A&Ox3. CN 2-12 grossly intact. Able to respond to questions appropriately and clearly. No acute focal neurologic deficits appreciated.  Psych: Appropriate mood and behavior.    Inpatient Medications:  aspirin, 325 mg, oral, Daily  [START ON 2/15/2025] atenolol, 50 mg, oral, Daily  DULoxetine, 20 mg, oral, BID  ezetimibe, 10 mg, oral, Daily  [START ON 2/15/2025] insulin glargine, 12 Units, subcutaneous, q AM  insulin lispro, 0-10 Units, subcutaneous, Before meals & nightly  [START ON 2/15/2025] isosorbide mononitrate ER, 30 mg, oral, Daily  levothyroxine, 50 mcg, oral, Daily  lisinopril, 40 mg, oral, Daily  oxygen, , inhalation, Continuous - 02/gases      PRN medications: acetaminophen, dextrose, dextrose, glucagon, glucagon, ondansetron, oxygen    Outpatient Medications:  Prior to Admission medications    Medication Sig Start Date End Date Taking? Authorizing Provider   aspirin 325 mg tablet Take 1 tablet " (325 mg) by mouth once daily. 2/21/14  Yes Historical Provider, MD   atenolol (Tenormin) 50 mg tablet Take 1 tablet (50 mg) by mouth once daily. as directed 1/10/25 3/11/25 Yes Mckinley Cortes PA-C   DULoxetine (Cymbalta) 20 mg DR capsule Take 1 capsule (20 mg) by mouth 2 times a day. Do not crush or chew. 2/5/25 1/31/26 Yes Judy Diggs MD   ezetimibe (Zetia) 10 mg tablet Take 1 tablet (10 mg) by mouth once daily. 2/5/24 2/14/25 Yes Judy Diggs MD   flash glucose sensor kit (FreeStyle Mando 2 Sensor) kit For continuous glucose monitoring.  Cool every 14 days 10/21/24  Yes Alfredito Bagley MD   insulin aspart (NovoLOG) 100 unit/mL (3 mL) pen Inject 1 unit for every 10 grams of carb.  Max of 60 units per day 2/5/25  Yes Alfredito Bagley MD   insulin glargine-yfgn (Semglee,insulin glargine-yfgn,) 100 unit/mL vial Inject under the skin once every 24 hours. Take as directed per insulin instructions.   Yes Historical Provider, MD   isosorbide mononitrate ER (Imdur) 30 mg 24 hr tablet Take 1 tablet (30 mg) by mouth once daily. Do not crush or chew. 1/10/25 3/11/25 Yes Mckinley Cortes PA-C   levothyroxine (Synthroid, Levoxyl) 50 mcg tablet Take 1 tablet (50 mcg) by mouth once daily. Take on an empty stomach. 11/4/24  Yes Judy Diggs MD   lisinopril 40 mg tablet TAKE ONE TABLET BY MOUTH EVERY DAY 12/2/24  Yes Alfredito Bagley MD   Semglee,insulin glarg-yfgn,Pen 100 unit/mL (3 mL) Pen Inject 12 Units under the skin once daily in the morning. 2/3/25 8/2/25 Yes Alfredito Bagley MD   dapagliflozin propanediol (Farxiga) 5 mg Take 1 tablet (5 mg) by mouth once daily. 12/23/24 2/13/25  Judy Diggs MD       LABS AND IMAGING:     Labs:  Recent labs reviewed in the EMR.    Results from last 7 days   Lab Units 02/10/25  1026   QUEST WBC AUTO Thousand/uL 4.6   QUEST HEMOGLOBIN g/dL 13.2   QUEST HEMATOCRIT % 40.5   [facility] PLATELETS AUTO Thousand/uL 362      Results  from last 7 days   Lab Units 02/10/25  1026 02/10/25  1023   QUEST SODIUM mmol/L 139 140   QUEST POTASSIUM mmol/L 5.0 5.0   QUEST CHLORIDE mmol/L 107 107   QUEST CO2 mmol/L 24 27   QUEST BUN mg/dL 40* 39*   QUEST CREATININE mg/dL 1.38* 1.24*   QUEST GLUCOSE mg/dL 131* 130*   QUEST CALCIUM mg/dL 9.0 8.7      Results from last 7 days   Lab Units 02/10/25  1026 02/10/25  1023   QUEST SODIUM mmol/L 139 140   QUEST POTASSIUM mmol/L 5.0 5.0   QUEST CHLORIDE mmol/L 107 107   QUEST CO2 mmol/L 24 27   QUEST BUN mg/dL 40* 39*   QUEST CREATININE mg/dL 1.38* 1.24*   QUEST CALCIUM mg/dL 9.0 8.7   QUEST PROTEIN TOTAL g/dL  --  6.1   QUEST BILIRUBIN TOTAL mg/dL  --  0.5   QUEST ALK PHOS U/L  --  60   QUEST ALT U/L  --  17   QUEST AST U/L  --  16   QUEST GLUCOSE mg/dL 131* 130*             Imaging:  No results found.

## 2025-02-14 NOTE — CARE PLAN
The patient's goals for the shift include  pt will be HDS throughout shift.    The clinical goals for the shift include Pt will be pain free throughout shift.    Over the shift, the patient did make progress toward the following goals. Barriers to progression include understand. Recommendations to address these barriers include education.

## 2025-02-15 VITALS
TEMPERATURE: 98.1 F | HEIGHT: 62 IN | HEART RATE: 81 BPM | RESPIRATION RATE: 18 BRPM | WEIGHT: 155 LBS | SYSTOLIC BLOOD PRESSURE: 131 MMHG | OXYGEN SATURATION: 96 % | DIASTOLIC BLOOD PRESSURE: 71 MMHG | BODY MASS INDEX: 28.52 KG/M2

## 2025-02-15 PROBLEM — R07.9 CHEST PAIN: Status: RESOLVED | Noted: 2025-01-27 | Resolved: 2025-02-15

## 2025-02-15 LAB
ANION GAP SERPL CALC-SCNC: 12 MMOL/L (ref 10–20)
BASOPHILS # BLD AUTO: 0.04 X10*3/UL (ref 0–0.1)
BASOPHILS NFR BLD AUTO: 0.7 %
BUN SERPL-MCNC: 40 MG/DL (ref 6–23)
CALCIUM SERPL-MCNC: 8.3 MG/DL (ref 8.6–10.3)
CHLORIDE SERPL-SCNC: 105 MMOL/L (ref 98–107)
CO2 SERPL-SCNC: 23 MMOL/L (ref 21–32)
CREAT SERPL-MCNC: 1.41 MG/DL (ref 0.5–1.05)
EGFRCR SERPLBLD CKD-EPI 2021: 42 ML/MIN/1.73M*2
EOSINOPHIL # BLD AUTO: 0.11 X10*3/UL (ref 0–0.7)
EOSINOPHIL NFR BLD AUTO: 2 %
ERYTHROCYTE [DISTWIDTH] IN BLOOD BY AUTOMATED COUNT: 13.4 % (ref 11.5–14.5)
GLUCOSE SERPL-MCNC: 327 MG/DL (ref 74–99)
HCT VFR BLD AUTO: 34.6 % (ref 36–46)
HGB BLD-MCNC: 11.3 G/DL (ref 12–16)
IMM GRANULOCYTES # BLD AUTO: 0.01 X10*3/UL (ref 0–0.7)
IMM GRANULOCYTES NFR BLD AUTO: 0.2 % (ref 0–0.9)
LYMPHOCYTES # BLD AUTO: 1.5 X10*3/UL (ref 1.2–4.8)
LYMPHOCYTES NFR BLD AUTO: 27.3 %
MCH RBC QN AUTO: 31.7 PG (ref 26–34)
MCHC RBC AUTO-ENTMCNC: 32.7 G/DL (ref 32–36)
MCV RBC AUTO: 97 FL (ref 80–100)
MONOCYTES # BLD AUTO: 0.6 X10*3/UL (ref 0.1–1)
MONOCYTES NFR BLD AUTO: 10.9 %
NEUTROPHILS # BLD AUTO: 3.23 X10*3/UL (ref 1.2–7.7)
NEUTROPHILS NFR BLD AUTO: 58.9 %
NRBC BLD-RTO: 0 /100 WBCS (ref 0–0)
PLATELET # BLD AUTO: 285 X10*3/UL (ref 150–450)
POTASSIUM SERPL-SCNC: 4.4 MMOL/L (ref 3.5–5.3)
RBC # BLD AUTO: 3.56 X10*6/UL (ref 4–5.2)
SODIUM SERPL-SCNC: 136 MMOL/L (ref 136–145)
WBC # BLD AUTO: 5.5 X10*3/UL (ref 4.4–11.3)

## 2025-02-15 PROCEDURE — G0378 HOSPITAL OBSERVATION PER HR: HCPCS

## 2025-02-15 PROCEDURE — 36415 COLL VENOUS BLD VENIPUNCTURE: CPT

## 2025-02-15 PROCEDURE — 2500000002 HC RX 250 W HCPCS SELF ADMINISTERED DRUGS (ALT 637 FOR MEDICARE OP, ALT 636 FOR OP/ED): Performed by: STUDENT IN AN ORGANIZED HEALTH CARE EDUCATION/TRAINING PROGRAM

## 2025-02-15 PROCEDURE — 80048 BASIC METABOLIC PNL TOTAL CA: CPT

## 2025-02-15 PROCEDURE — 2500000001 HC RX 250 WO HCPCS SELF ADMINISTERED DRUGS (ALT 637 FOR MEDICARE OP): Performed by: INTERNAL MEDICINE

## 2025-02-15 PROCEDURE — 99239 HOSP IP/OBS DSCHRG MGMT >30: CPT | Performed by: NURSE PRACTITIONER

## 2025-02-15 PROCEDURE — 85025 COMPLETE CBC W/AUTO DIFF WBC: CPT

## 2025-02-15 PROCEDURE — 99233 SBSQ HOSP IP/OBS HIGH 50: CPT | Performed by: FAMILY MEDICINE

## 2025-02-15 RX ADMIN — ISOSORBIDE MONONITRATE 30 MG: 30 TABLET, EXTENDED RELEASE ORAL at 09:23

## 2025-02-15 RX ADMIN — INSULIN GLARGINE 12 UNITS: 100 INJECTION, SOLUTION SUBCUTANEOUS at 09:23

## 2025-02-15 ASSESSMENT — ENCOUNTER SYMPTOMS
FALLS: 0
DECREASED APPETITE: 0
BLURRED VISION: 0
SYNCOPE: 0
IRREGULAR HEARTBEAT: 0
RESPIRATORY NEGATIVE: 1
FOCAL WEAKNESS: 0
MEMORY LOSS: 0
DEPRESSION: 0
GASTROINTESTINAL NEGATIVE: 1
ORTHOPNEA: 0
DYSPNEA ON EXERTION: 0
COUGH: 0
LIGHT-HEADEDNESS: 0
SHORTNESS OF BREATH: 0
CONSTITUTIONAL NEGATIVE: 1
PALPITATIONS: 0

## 2025-02-15 ASSESSMENT — COGNITIVE AND FUNCTIONAL STATUS - GENERAL
MOBILITY SCORE: 24
DAILY ACTIVITIY SCORE: 24

## 2025-02-15 ASSESSMENT — PAIN SCALES - GENERAL
PAINLEVEL_OUTOF10: 0 - NO PAIN
PAINLEVEL_OUTOF10: 0 - NO PAIN

## 2025-02-15 ASSESSMENT — PAIN - FUNCTIONAL ASSESSMENT
PAIN_FUNCTIONAL_ASSESSMENT: 0-10
PAIN_FUNCTIONAL_ASSESSMENT: 0-10

## 2025-02-15 NOTE — PROGRESS NOTES
Jeanine Ghosh is a 64 y.o. female on day 0 of admission presenting with Chest pain.      Subjective   64 y.o. female with a significant past medical history of HTN, HLD, CAD s/p CABG (1997), T1DM, CKD, hypothyroidism, anxiety, hearing loss (wears hearing aids), who was admitted after undergoing heart cath for evaluation of chest pain on 02/14/2025. Medicine was consulted for medical management. Patient seen and examined, overall no specific complaints presently. Plan is for discharge in the morning per cardiology and continued home therapies (atenolol, Imdur) with further follow-up as directed (?CMC). Overall doing well post-cath.     2/15:                Today the patient is found awake alert and interactive appropriately.  Voices no specific complaints and no acute events overnight.  Cardiology planned for discharge today but was concerned regarding her hyperglycemia.  Long discussion with the patient regarding this and she feels related to being n.p.o. status through the day and not getting any insulin yesterday and at the time of the morning blood sugar had really not received insulin yet.  She feels she is very capable of managing her blood sugars at home and is very aware of hypoglycemic symptoms.  She desires discharge home.  Feel after discussion with the patient that she will be able to manage her blood sugars and agrees to call her endocrinologist if any concerns.  Otherwise denies interval new symptoms or complaints including chest pain palpitations pleuritic type pain unusual shortness of breath cough sputum nausea abdominal pain flank pain dysuria diarrhea headache visual disturbances fever or chills.       Objective     Last Recorded Vitals  /71   Pulse 81   Temp 36.7 °C (98.1 °F) (Temporal)   Resp 18   Wt 70.3 kg (155 lb)   SpO2 96%   Intake/Output last 3 Shifts:    Intake/Output Summary (Last 24 hours) at 2/15/2025 1650  Last data filed at 2/15/2025 0445  Gross per 24 hour   Intake 1300  ml   Output --   Net 1300 ml       Admission Weight  Weight: 70.3 kg (155 lb) (02/14/25 1019)    Daily Weight  02/14/25 : 70.3 kg (155 lb)    Image Results  Cardiac Catheterization Procedure         [facility], Cath Lab  [address] [place] [address]     Phone [phone] Fax [phone]    Cardiovascular Catheterization Report    Patient Name:     JANES CRUZ    Performing Physician:  Anirudh Brewer MD  Study Date:       2/14/2025           Verifying Physician:   Anirudh Brewer MD  MRN/PID:          [id number]            Cardiologist/Co-Scrub:  Accession#:       [id number]        Ordering Provider:     Anirudh BREWER  Date of           1960 / 64      Cardiologist:  Birth/Age:        years  Gender:           F                   Fellow:                [id number] Moises Aranda MD  Encounter#:       [id number]          Surgeon:       Study: Left Heart Cath       Indications:  JANES CRUZ is a 65 year old female who presents with dyslipidemia, hypertension, prior coronary artery bypass graft surgery and renal failure, Angina. JANES CRUZ is a 65 year old female who presents with coronary artery disease, dyslipidemia, hypertension, metabolic syndrome, prior myocardial infarction, prior coronary artery bypass graft surgery, diabetes and a chest pain assessment of typical angina Angina. New onset angina <=2 months.  Stress test performed: No. CTA performed: No. Agatston accessed: No. LVEF  Assessed: Yes.  Cardiac arrest: No.  Cardiac surgical consult: No.  Cardiovascular Instability: No  Frailty status of patient entering lab: 5 = Mildly frail.       Procedure Description:  After infiltration with 1% Lidocaine, the right was cannulated with a modified Seldinger technique. Subsequently a 6  Estonian sheath was placed retrograde in the right. Multiple injections of contrast were made into the left and right coronary arteries with angiograms recorded in multiple projections.     Coronary Angiography:  The coronary circulation is right dominant.     Left Main Coronary Artery:  The left main coronary artery is a normal caliber vessel. The left main arises normally from the left coronary sinus of Valsalva and bifurcates into the LAD and circumflex coronary arteries. The left main coronary artery showed mild atherosclerotic disease.     Left Anterior Descending Coronary Artery Distribution:  The left anterior descending coronary artery is a normal caliber vessel. The LAD arises normally from the left main coronary artery. The LAD demonstrated diffuse moderate atherosclerotic disease. The 1st diagonal branch is a large caliber vessel. The 1st diagonal branch showed no significant disease or stenosis greater than 30%. The entire 1st diagonal branch revealed 50% stenosis.     Circumflex Coronary Artery Distribution:  The circumflex coronary artery is a normal caliber vessel. The circumflex arises normally from the left main coronary artery and terminates in the AV groove. The circumflex revealed moderate mid to distal atherosclerotic disease.     Right Coronary Artery Distribution:    The right coronary artery is a normal caliber vessel. The RCA arises normally from the right sinus of Valsalva. The RCA showed severe mid to distal atherosclerotic disease. The mid to distal right coronary artery showed 90% stenosis. An additional RCA lesion, located at the distal right coronary artery, revealed 90% stenosis. This second lesion was calcified. The acute marginal branch showed no significant disease or stenosis greater than 30%. The right posterolateral branch showed no significant disease or stenosis greater than 30%. The right posterior descending artery showed no significant disease or stenosis greater than 30%.      Coronary Lesion Summary:  Vessel            Stenosis      Vessel Segment  Left Main    10 to 30% stenosis  LAD             50% stenosis  1st Diagonal    50% stenosis        entire  Circumflex      50% stenosis  RCA             90% stenosis    mid to distal  RCA             90% stenosis        distal  RPL          10 to 30% stenosis  RPDA         10 to 30% stenosis       Kwesio Personnel:  +---------------+---------+  Name           Duty       +---------------+---------+  Surya Brewer MD 1  +---------------+---------+       Hemodynamic Pressures:     +----+-------------------+---------+------------+-------------+------+---------+  Site     Date Time       Phase    Systolic    Diastolic    ED  Mean mmHg                           Name       mmHg        mmHg      mmHg            +----+-------------------+---------+------------+-------------+------+---------+    AO  2/14/2025 4:15:46 AIR REST         179           68            112                       PM                                                   +----+-------------------+---------+------------+-------------+------+---------+    LV  2/14/2025 4:19:44 AIR REST         185            0    18                                PM                                                   +----+-------------------+---------+------------+-------------+------+---------+   LVp  2/14/2025 4:19:53 AIR REST         187            1    18                                PM                                                   +----+-------------------+---------+------------+-------------+------+---------+   AOp  2/14/2025 4:20:00 AIR REST         177           66            112                       PM                                                   +----+-------------------+---------+------------+-------------+------+---------+    AO  2/14/2025 4:20:53 AIR REST         182           69             114                       PM                                                   +----+-------------------+---------+------------+-------------+------+---------+         Oxygen Saturation %:  +-----------+------------+  Sample SiteHB (g/100ml)  +-----------+------------+      SYS ART        13.2  +-----------+------------+      SYS MARY        13.2  +-----------+------------+      PUL ART        13.2  +-----------+------------+      PUL MARY        13.2  +-----------+------------+       Cardiac Cath Post Procedure Notes:  Post Procedure Diagnosis: Single vessel disease.  Blood Loss:               Estimated blood loss during the procedure was 10 mls.  Specimens Removed:        Number of specimen(s) removed: none.       Recommendations:  Maximize medical therapy.  Agressive risk factor modification efforts.  Lipid lowering agent or Statin therapy.  Percutaneous coronary intervention.    ____________________________________________________________________________________  CONCLUSIONS:   1. Single vessel coronary artery disease without proximal left anterior descending involvement.   2. Severe sequential calcific stenosis of mid to distal RCA.   3. Staged PCI using atherectomy of the RCA in the following weeks.   4. Aggressive secodary prevention.    ICD 10 Codes:  Angina pectoris, unspecified-I20.9     CPT Codes:  Left Heart Cath Bypass Graft w ventriculography and coronary angio(L[facility])-35164; Moderate Sedation Services 1st additional 15 minutes patient >5 years-77854; Moderate Sedation Services 2nd additional 15 minutes patient >5 years-[id number]     [id number] Surya Brewer MD  Performing Physician  Electronically signed by [id number] Surya Brewer MD on 2/14/2025 at 7:00:27 PM         ** Final **      Physical Exam  Constitutional: Pleasant and cooperative. Laying in bed in no acute distress. Conversant.   Skin: Warm and dry; no obvious lesions, rashes, pallor, or jaundice.   Eyes: EOMI. Anicteric sclera.  "  ENT: Mucous membranes moist; no obvious injury or deformity appreciated.   Head and Neck: Normocephalic, atraumatic. ROM preserved. Trachea midline.   Respiratory: Nonlabored on RA. Lungs clear to auscultation bilaterally without obvious adventitious sounds. Chest rise is equal.  Cardiovascular: RRR. No gross murmur, gallop, or rub. Extremities are warm and well-perfused. No chest wall tenderness.   GI: Abdomen soft, nontender, nondistended.   : No CVA tenderness.   MSK: No gross abnormalities appreciated. No limitations to AROM/PROM appreciated.   Extremities: No cyanosis, edema, or clubbing evident. Neurovascularly intact.   Neuro: A&Ox3. CN 2-12 grossly intact. Able to respond to questions appropriately and clearly. No acute focal neurologic deficits appreciated.  Psych: Appropriate mood and behavior.  Relevant Results               Assessment/Plan                  Assessment & Plan  Stable angina (CMS-HCC)    Chest pain with h/o CABG s/p CABG (1997) now s/p heart cath (02/14/2025)   -Cath findings:               \"LVEDP - 18mmHg  Right dominant system  Severe calcific distal right stenosis - would need rotablation - for staged PCI in the main campus   Diffuse LAD stenosis, Lcx- diffuse moderate disease     Grafts  LIMA - occluded  Radial graft to OM - patent\"   -NVS: Intact  -Continue current therapies   -Monitor on tele   -DVT Prophylaxis: as per primary service     HTN, HLD   -Continued on home therapies   -Adjust as needed     IDDM   -Continue home basal insulin at current dosage (takes in AM)   -Continue with SSI   -Continue with accucheks, hypoglycemic protocol   -Monitor and adjust as needed    2/15: Significant hyperglycemia this morning likely related to being off her usual insulin regimen throughout the day yesterday and had not received insulin yet today.  Discussed at length with the patient and she feels very comfortable managing her blood sugars at home and feel she can safely be discharged at " this time.  Agrees to call her endocrinologist if any concern with her blood sugars.     CKD   -On admission, near/at baseline   -Continue to monitor while admitted       Hypothyroidism   -Continue home synthroid      Anxiety   -Continue home duloxetine               Mckinley Navarro MD

## 2025-02-15 NOTE — DISCHARGE SUMMARY
CARDIOLOGY DISCHARGE SUMMARY  Discharge Diagnosis  Chest pain    HPI:  Jeanine Ghosh is a 64 y.o. female patient who presents today for 6-week followup of LE edema and fatigue s/p stress test. Her PMH is significant for CAD s/p CABG x2 in 1997, hyperlipidemia, Hashimoto's thyroiditis, DM type 1, CKD stage 3, anxiety, fibromyalgia, and reactive airways disease. Stress testing performed 07/24/2024 was negative for ischemia. Echocardiogram performed today, 08/06/2024, demonstrated normal LV systolic function and trace to mild aortic regurgitation. Today, the patient states that she is feeling well with no new cardiac complaints.    1-10-25: This is a 64-year-old female patient known to Dr. Brewer.  Presents with her  today for follow-up of ongoing symptoms of exertional chest pain.  She had called the office and was referred to the emergency department but did not want to go so she presents today for follow-up.  Most of her episodes are tightness in her upper chest toward the left shoulder similar to what she had had prior to having bypass surgery and had disclosed this to Dr. Brewer even back at a visit earlier this year.  Stress testing was done and during maximal infusion of regadenoson she did have some EKG changes and the same type of chest pain however her perfusion imaging did not show any obvious ischemia.  Her echocardiogram has shown normal LV systolic function.  I discussed with her the possibility of proceeding with a heart catheterization but she does have chronic kidney disease and this was concerning to her so at this time she is agreeable to increase medical therapy with doubling of her atenolol dosing and we will add isosorbide.  Patient does understand that if she has intractable discomfort not related to any particular activity to please call 911 or go to the emergency department and she verbalizes understanding.  Patient had ongoing chest discomfort and was scheduled for [facility].     Subjective  Data:  Feels well today.  Has been up in room without lightheadedness/CP/dyspnea.  She understands that we are planning on Rotablator assisted PCI at [facility] in the next 1-2 weeks.  Groin site looks good, HR BP controlled.  Creatinine 1.41.  BS is elevated and I d/w Dr Navarro who will review with patient and get her back on her usual diabetic meds. Monitor: SR    Last Labs:  Results from last 7 days   Lab Units 02/15/25  0502 02/10/25  1026 02/10/25  1023   [facility] SODIUM mmol/L  --  139 140   SODIUM mmol/L 136  --   --    [facility] POTASSIUM mmol/L  --  5.0 5.0   POTASSIUM mmol/L 4.4  --   --    [facility] CHLORIDE mmol/L  --  107 107   CHLORIDE mmol/L 105  --   --    [facility] CO2 mmol/L  --  24 27   CO2 mmol/L 23  --   --    BUN mg/dL 40*  --   --    [facility] BUN mg/dL  --  40* 39*   CREATININE mg/dL 1.41*  --   --    [facility] CREATININE mg/dL  --  1.38* 1.24*   [facility] GLUCOSE mg/dL  --  131* 130*   GLUCOSE mg/dL 327*  --   --    [facility] CALCIUM mg/dL  --  9.0 8.7   CALCIUM mg/dL 8.3*  --   --      Results from last 7 days   Lab Units 02/15/25  0502   WBC AUTO x10*3/uL 5.5   HEMOGLOBIN g/dL 11.3*   HEMATOCRIT % 34.6*   PLATELETS AUTO x10*3/uL 285         Hospital Course  LHC done yesterday and she will need Rotablator assisted PCI of the RCA at [facility] in the near future. Medical tx for now.  IMS consulted for medical management yesterday.  Monitored overnight, no issues.       Cath:  Cardiac Catheterization Procedure 02/14/2025  Recommendations:  Maximize medical therapy.  Agressive risk factor modification efforts.  Lipid lowering agent or Statin therapy.  Percutaneous coronary intervention.     ____________________________________________________________________________________  CONCLUSIONS:   1. Single vessel coronary artery disease without proximal left anterior descending involvement.   2. Severe sequential calcific stenosis of mid to distal RCA.   3. Staged PCI using atherectomy of the RCA in the following weeks.   4. Aggressive secodary  prevention.      Review of Systems   Constitutional: Negative. Negative for decreased appetite and malaise/fatigue.   HENT: Negative.     Eyes:  Negative for blurred vision and visual disturbance.   Cardiovascular:  Negative for chest pain, dyspnea on exertion, irregular heartbeat, leg swelling, orthopnea, palpitations and syncope.   Respiratory: Negative.  Negative for cough and shortness of breath.    Musculoskeletal:  Negative for arthritis and falls.   Gastrointestinal: Negative.    Neurological:  Negative for focal weakness and light-headedness.   Psychiatric/Behavioral:  Negative for depression and memory loss.         Pertinent Physical Exam At Time of Discharge  Physical Exam  Constitutional:       Appearance: Normal appearance.   HENT:      Head: Normocephalic.   Eyes:      Conjunctiva/sclera: Conjunctivae normal.   Cardiovascular:      Rate and Rhythm: Normal rate and regular rhythm.      Pulses: Normal pulses.      Heart sounds: S1 normal and S2 normal. No murmur heard.     No friction rub. No gallop.   Pulmonary:      Effort: Pulmonary effort is normal.      Breath sounds: Normal breath sounds.   Abdominal:      General: Bowel sounds are normal.      Palpations: Abdomen is soft.      Tenderness: There is no abdominal tenderness.   Musculoskeletal:      Cervical back: Neck supple.      Right lower leg: No edema.      Left lower leg: No edema.      Comments: Right femoral site without ecchymosis or hematoma.  Good distal pulses.   Skin:     General: Skin is warm and dry.   Neurological:      General: No focal deficit present.      Mental Status: She is alert and oriented to person, place, and time.   Psychiatric:         Attention and Perception: Attention normal.         Mood and Affect: Mood normal.          ASSESSMENT/PLAN  Chest pain with h/o CABG s/p CABG (1997) now s/p heart cath (02/14/2025)   [facility] with severe RCA disease.  Will plan for PCI with atherectomy of the RCA at [facility] in 1-2 weeks, patient is  aware. Continue on ASA, atenolol, Zetia, Imdur, and lisinopril.  No CP/pressure today, groin site looks good.     HTN  BP table.      IDDM   -Continue home basal insulin at current dosage (takes in AM)   -Continue with SSI   -Continue with accucheks, hypoglycemic protocol   -Monitor and adjust as needed   2-15-25: IMS following.  Should be OK for d/c home today.       CKD   Stable       Home Medications     Medication List      CONTINUE taking these medications     FreeStyle Mando 2 Sensor kit; Generic drug: flash glucose sensor kit;   For continuous glucose monitoring.  Cool every 14 days     ASK your doctor about these medications     aspirin 325 mg tablet   atenolol 50 mg tablet; Commonly known as: Tenormin; Take 1 tablet (50   mg) by mouth once daily. as directed   DULoxetine 20 mg DR capsule; Commonly known as: Cymbalta; Take 1 capsule   (20 mg) by mouth 2 times a day. Do not crush or chew.   ezetimibe 10 mg tablet; Commonly known as: Zetia; Take 1 tablet (10 mg)   by mouth once daily.   insulin aspart 100 unit/mL (3 mL) pen; Commonly known as: NovoLOG;   Inject 1 unit for every 10 grams of carb.  Max of 60 units per day   isosorbide mononitrate ER 30 mg 24 hr tablet; Commonly known as: Imdur;   Take 1 tablet (30 mg) by mouth once daily. Do not crush or chew.   levothyroxine 50 mcg tablet; Commonly known as: Synthroid, Levoxyl; Take   1 tablet (50 mcg) by mouth once daily. Take on an empty stomach.   lisinopril 40 mg tablet; TAKE ONE TABLET BY MOUTH EVERY DAY   * Semglee(insulin glargine-yfgn) 100 unit/mL vial; Generic drug: insulin   glargine-yfgn   * Semglee(insulin glarg-yfgn)Pen 100 unit/mL (3 mL) Pen; Generic drug:   insulin glargine-yfgn; Inject 12 Units under the skin once daily in the   morning.  * This list has 2 medication(s) that are the same as other medications   prescribed for you. Read the directions carefully, and ask your doctor or   other care provider to review them with you.     Follow up  at  [facility] to be arranged s/p PCI at [facility]    Outpatient Follow-Up  Future Appointments   Date Time Provider Department Center   6/27/2025  9:45 AM Alfredito Bagley MD [id number] [facility]   8/7/2025 10:15 AM Surya Brewer MD [id number] [facility]   2/12/2026  8:00 AM Judy Diggs MD [id number] [facility]     Total d/c time was > 35 minutes.         Mariah Sanchez, APRN-CNP  2/15/2025  8:38 AM

## 2025-02-15 NOTE — DISCHARGE INSTRUCTIONS
Cath Lab Interventional:   Activity:  You are advised to go directly home from the hospital.    Wound Care:  If slight bleeding should occur at site, lie down and have someone apply firm pressure just above the puncture site for 5 minutes.  If it continues or is profuse, call 911. Always notify your doctor if bleeding occurs.     Keep site clean and dry. Let air dry or you may use a simple bandaid.     Gently cleanse the puncture site in your groin with soap and water only.     You may experience some tenderness, bruising or minimal inflammation.  If you have any concerns, you may contact the Cath Lab or if any of these symptoms become excessive, contact your cardiologist or go to the emergency room.     No tub baths, soaking, or swimming for one week.     May shower the next day after your procedure.    Diet:  Heart healthy, low sodium diet.    Other Instructions:  If you develop diffficulty breathing, rash, hives, severe nausea, vomiting, light-headedness or any signs of infection, immediately contact your doctor and go to the nearest emergency room.     If your doctor has prescribed aspirin and/or clopidogrel (Plavix) or prasugrel (Effient) or ticagrelor (Brilinta) and you have a stent in your heart arteries, DO NOT STOP THESE MEDICATIONS for any reason without talking first to your cardiologist.     You must take your aspirin, clopidogrel (Plavix), prasugrel (Effient), or ticagrelor (Brilinta) every day without missing a single dose.  If you are getting low on these medications, contact your physician immediately for a refill.

## 2025-02-17 ENCOUNTER — TELEPHONE (OUTPATIENT)
Dept: CARDIOLOGY | Facility: HOSPITAL | Age: 65
End: 2025-02-17
Payer: COMMERCIAL

## 2025-02-17 DIAGNOSIS — I25.110 CORONARY ARTERY DISEASE INVOLVING NATIVE CORONARY ARTERY OF NATIVE HEART WITH UNSTABLE ANGINA PECTORIS: ICD-10-CM

## 2025-02-17 DIAGNOSIS — I25.10 CORONARY ARTERY STENOSIS: ICD-10-CM

## 2025-02-17 NOTE — PROGRESS NOTES
Counseling:  The patient was counseled regarding diagnostic results, instructions for management, risk factor reductions, prognosis, patient and family education, impressions, risks and benefits of treatment options and importance of compliance with treatment.      Chief Complaint:  The patient presents today for 1-month followup of chest pain s/p LHC.     History Of Present Illness:    Jeanine Ghosh is a 64 y.o. female patient who presents today in the company of her daughter for 1-month followup of chest pain s/p LHC. Her PMH is significant for CAD s/p CABG x2 in 1997, hyperlipidemia, Hashimoto's thyroiditis, DM type 1, CKD stage 3, anxiety, fibromyalgia, and reactive airways disease. LHC performed 02/14/2025 revealed revealing single vessel CAD with severe sequential calcific stenosis of mid to distal RCA. The plan is for staged PCI using atherectomy of the RCA in the following weeks. Today, the patient states that she is feeling relatively well, having recovered from her heart cath.     Past Surgical History:  She has a past surgical history that includes Other surgical history (06/15/2022); Other surgical history (08/26/2019); Other surgical history (08/26/2019); Other surgical history (08/26/2019); Other surgical history (08/26/2019); Coronary artery bypass graft (03/13/2018); Other surgical history (07/07/2022); and Cardiac catheterization (N/A, 2/14/2025).      Social History:  She reports that she has never smoked. She has never been exposed to tobacco smoke. She has never used smokeless tobacco. She reports that she does not currently use alcohol. She reports that she does not currently use drugs.    Family History:  Family History   Problem Relation Name Age of Onset    COPD Brother          Allergies:  Animal dander and Morphine    Outpatient Medications:  Current Outpatient Medications   Medication Instructions    aspirin 325 mg tablet 1 tablet, Daily    atenolol (TENORMIN) 50 mg, oral, Daily, as  "directed    DULoxetine (CYMBALTA) 20 mg, oral, 2 times daily, Do not crush or chew.    ezetimibe (ZETIA) 10 mg, oral, Daily    flash glucose sensor kit (FreeStyle Mando 2 Sensor) kit For continuous glucose monitoring.  Cool every 14 days    insulin aspart (NovoLOG) 100 unit/mL (3 mL) pen Inject 1 unit for every 10 grams of carb.  Max of 60 units per day    insulin glargine-yfgn (Semglee,insulin glargine-yfgn,) 100 unit/mL vial Every 24 hours    isosorbide mononitrate ER (IMDUR) 30 mg, oral, Daily, Do not crush or chew.    levothyroxine (SYNTHROID, LEVOXYL) 50 mcg, oral, Daily, Take on an empty stomach.    lisinopril 40 mg, oral, Daily    Semglee(insulin glarg-yfgn)Pen 12 Units, subcutaneous, Every morning        Last Recorded Vitals:  Vitals:    02/18/25 1511   BP: 118/66   BP Location: Right arm   Pulse: 65   Weight: 70.3 kg (155 lb)   Height: 1.575 m (5' 2\")       Review of Systems   All other systems reviewed and are negative.     Physical Exam:  Constitutional:       Appearance: Healthy appearance. Not in distress.   Neck:      Vascular: No JVR. JVD normal.   Pulmonary:      Effort: Pulmonary effort is normal.      Breath sounds: Normal breath sounds. No wheezing. No rhonchi. No rales.   Chest:      Chest wall: Not tender to palpatation.   Cardiovascular:      PMI at left midclavicular line. Normal rate. Regular rhythm. Normal S1. Normal S2.       Murmurs: There is a grade 2/4 diastolic murmur at the URSB.      No gallop.  No click. No rub.   Pulses:     Intact distal pulses.   Edema:     Peripheral edema absent.   Abdominal:      General: Bowel sounds are normal.      Palpations: Abdomen is soft.      Tenderness: There is no abdominal tenderness.   Musculoskeletal: Normal range of motion.         General: No tenderness. Skin:     General: Skin is warm and dry.   Neurological:      General: No focal deficit present.      Mental Status: Alert and oriented to person, place and time.            Last Labs:  CBC " -  Lab Results   Component Value Date    WBC 5.5 02/15/2025    WBC 4.6 02/10/2025    HGB 11.3 (L) 02/15/2025    HGB 13.2 02/10/2025    HCT 34.6 (L) 02/15/2025    HCT 40.5 02/10/2025    MCV 97 02/15/2025    MCV 95.7 02/10/2025     02/15/2025     02/10/2025       CMP -  Lab Results   Component Value Date    CALCIUM 8.3 (L) 02/15/2025    CALCIUM 9.0 02/10/2025    PROT 6.1 02/10/2025    ALBUMIN 3.7 02/10/2025    AST 16 02/10/2025    ALT 17 02/10/2025    ALKPHOS 60 02/10/2025    BILITOT 0.5 02/10/2025       LIPID PANEL -   Lab Results   Component Value Date    CHOL 213 (H) 02/10/2025    TRIG 90 02/10/2025    HDL 67 02/10/2025    CHHDL 3.2 02/10/2025    LDLF 102 (H) 07/08/2022    VLDL 16 02/05/2024    NHDL 146 (H) 02/10/2025       RENAL FUNCTION PANEL -   Lab Results   Component Value Date    GLUCOSE 327 (H) 02/15/2025    GLUCOSE 131 (H) 02/10/2025     02/15/2025     02/10/2025    K 4.4 02/15/2025    K 5.0 02/10/2025     02/15/2025     02/10/2025    CO2 23 02/15/2025    CO2 24 02/10/2025    ANIONGAP 12 02/15/2025    ANIONGAP 8 02/10/2025    BUN 40 (H) 02/15/2025    BUN 40 (H) 02/10/2025    CREATININE 1.41 (H) 02/15/2025    CREATININE 1.38 (H) 02/10/2025    CALCIUM 8.3 (L) 02/15/2025    CALCIUM 9.0 02/10/2025    ALBUMIN 3.7 02/10/2025        Lab Results   Component Value Date    HGBA1C 9.1 (H) 02/10/2025    HGBA1C 8.6 (A) 06/14/2024       Last Cardiology Tests:  02/14/2025 - Cardiac Catheterization (LH)  1. Single vessel coronary artery disease without proximal left anterior descending involvement.  2. Severe sequential calcific stenosis of mid to distal RCA.  3. Staged PCI using atherectomy of the RCA in the following weeks.  4. Aggressive secodary prevention.    08/06/2024 - TTE  1. The left ventricular systolic function is normal, with a León's biplane calculated ejection fraction of 59%.  2. There is normal right ventricular global systolic function.    07/24/2024 - Stress  Test  1. Normal stress myocardial perfusion imaging in response to pharmacologic stress. Mild attenuation of the anterolateral segments seen on the supine images that improves with prone imaging most likely soft tissue attenuation. Well-maintained left ventricular function.   2. Abnormal due to chest pain and borderline EKG abnormalities.     09/13/2019 - Stress Test  Normal exercise stress myocardial perfusion imaging.    Diagnostic review: I have personally reviewed the result(s) of the [facility].    Assessment/Plan   1) CAD s/p Remote CABG x2 in 1997  On  mg daily, atenolol 25 mg daily, Zetia 10 mg daily, lisinopril 40 mg daily, Imdur 30 mg daily  Stress 09/2019 negative for ischemia  2/4 diastolic murmur RUSB   Stress 07/24/2024 negative for ischemia  TTE 08/06/2024 with LVEF 59%, trace-mild aortic regurgitation   [facility] 02/14/2025 with single vessel CAD with severe sequential calcific stenosis of mid to distal RCA  Plan is for staged PCI using atherectomy of the RCA in the following weeks  Extensive discuss had with patient and her daughter about findings of [facility] and recommended treatment   Educated that redo 1-vessel CABG comes with higher risk when compared to PCI with atherectomy   All questions answered to the patient and daughter's satisfaction   Continue current medical Rx   PCI with atherectomy tentatively scheduled for 03/05/2025  Patient and daughter will take some time if they wish to have a surgical consult to discuss 1-vessel CABG, although patient is leaning towards undergoing PCI.  F/U after PCI     2) HTN  Stable  On atenolol 25 mg daily, lisinopril 40 mg daily   Continue current medical Rx     3) Hyperlipidemia  Goal LDL <70  On Zetia 10 mg daily   Lipid panel 02/10/2025 with LDL of 127  Patient was previously on statin therapy, but discontinued s/t myalgias   Myalgias have persisted despite being off statin therapy   Start atorvastatin 80 mg daily       Scribe Attestation  By signing my name  below, I, Jayleen Neil   attest that this documentation has been prepared under the direction and in the presence of Surya Brewer MD.

## 2025-02-17 NOTE — H&P (VIEW-ONLY)
Counseling:  The patient was counseled regarding diagnostic results, instructions for management, risk factor reductions, prognosis, patient and family education, impressions, risks and benefits of treatment options and importance of compliance with treatment.      Chief Complaint:  The patient presents today for 1-month followup of chest pain s/p LHC.     History Of Present Illness:    Jeanine Ghosh is a 64 y.o. female patient who presents today in the company of her daughter for 1-month followup of chest pain s/p LHC. Her PMH is significant for CAD s/p CABG x2 in 1997, hyperlipidemia, Hashimoto's thyroiditis, DM type 1, CKD stage 3, anxiety, fibromyalgia, and reactive airways disease. LHC performed 02/14/2025 revealed revealing single vessel CAD with severe sequential calcific stenosis of mid to distal RCA. The plan is for staged PCI using atherectomy of the RCA in the following weeks. Today, the patient states that she is feeling relatively well, having recovered from her heart cath.     Past Surgical History:  She has a past surgical history that includes Other surgical history (06/15/2022); Other surgical history (08/26/2019); Other surgical history (08/26/2019); Other surgical history (08/26/2019); Other surgical history (08/26/2019); Coronary artery bypass graft (03/13/2018); Other surgical history (07/07/2022); and Cardiac catheterization (N/A, 2/14/2025).      Social History:  She reports that she has never smoked. She has never been exposed to tobacco smoke. She has never used smokeless tobacco. She reports that she does not currently use alcohol. She reports that she does not currently use drugs.    Family History:  Family History   Problem Relation Name Age of Onset    COPD Brother          Allergies:  Animal dander and Morphine    Outpatient Medications:  Current Outpatient Medications   Medication Instructions    aspirin 325 mg tablet 1 tablet, Daily    atenolol (TENORMIN) 50 mg, oral, Daily, as  "directed    DULoxetine (CYMBALTA) 20 mg, oral, 2 times daily, Do not crush or chew.    ezetimibe (ZETIA) 10 mg, oral, Daily    flash glucose sensor kit (FreeStyle Mando 2 Sensor) kit For continuous glucose monitoring.  Cool every 14 days    insulin aspart (NovoLOG) 100 unit/mL (3 mL) pen Inject 1 unit for every 10 grams of carb.  Max of 60 units per day    insulin glargine-yfgn (Semglee,insulin glargine-yfgn,) 100 unit/mL vial Every 24 hours    isosorbide mononitrate ER (IMDUR) 30 mg, oral, Daily, Do not crush or chew.    levothyroxine (SYNTHROID, LEVOXYL) 50 mcg, oral, Daily, Take on an empty stomach.    lisinopril 40 mg, oral, Daily    Semglee(insulin glarg-yfgn)Pen 12 Units, subcutaneous, Every morning        Last Recorded Vitals:  Vitals:    02/18/25 1511   BP: 118/66   BP Location: Right arm   Pulse: 65   Weight: 70.3 kg (155 lb)   Height: 1.575 m (5' 2\")       Review of Systems   All other systems reviewed and are negative.     Physical Exam:  Constitutional:       Appearance: Healthy appearance. Not in distress.   Neck:      Vascular: No JVR. JVD normal.   Pulmonary:      Effort: Pulmonary effort is normal.      Breath sounds: Normal breath sounds. No wheezing. No rhonchi. No rales.   Chest:      Chest wall: Not tender to palpatation.   Cardiovascular:      PMI at left midclavicular line. Normal rate. Regular rhythm. Normal S1. Normal S2.       Murmurs: There is a grade 2/4 diastolic murmur at the URSB.      No gallop.  No click. No rub.   Pulses:     Intact distal pulses.   Edema:     Peripheral edema absent.   Abdominal:      General: Bowel sounds are normal.      Palpations: Abdomen is soft.      Tenderness: There is no abdominal tenderness.   Musculoskeletal: Normal range of motion.         General: No tenderness. Skin:     General: Skin is warm and dry.   Neurological:      General: No focal deficit present.      Mental Status: Alert and oriented to person, place and time.            Last Labs:  CBC " -  Lab Results   Component Value Date    WBC 5.5 02/15/2025    WBC 4.6 02/10/2025    HGB 11.3 (L) 02/15/2025    HGB 13.2 02/10/2025    HCT 34.6 (L) 02/15/2025    HCT 40.5 02/10/2025    MCV 97 02/15/2025    MCV 95.7 02/10/2025     02/15/2025     02/10/2025       CMP -  Lab Results   Component Value Date    CALCIUM 8.3 (L) 02/15/2025    CALCIUM 9.0 02/10/2025    PROT 6.1 02/10/2025    ALBUMIN 3.7 02/10/2025    AST 16 02/10/2025    ALT 17 02/10/2025    ALKPHOS 60 02/10/2025    BILITOT 0.5 02/10/2025       LIPID PANEL -   Lab Results   Component Value Date    CHOL 213 (H) 02/10/2025    TRIG 90 02/10/2025    HDL 67 02/10/2025    CHHDL 3.2 02/10/2025    LDLF 102 (H) 07/08/2022    VLDL 16 02/05/2024    NHDL 146 (H) 02/10/2025       RENAL FUNCTION PANEL -   Lab Results   Component Value Date    GLUCOSE 327 (H) 02/15/2025    GLUCOSE 131 (H) 02/10/2025     02/15/2025     02/10/2025    K 4.4 02/15/2025    K 5.0 02/10/2025     02/15/2025     02/10/2025    CO2 23 02/15/2025    CO2 24 02/10/2025    ANIONGAP 12 02/15/2025    ANIONGAP 8 02/10/2025    BUN 40 (H) 02/15/2025    BUN 40 (H) 02/10/2025    CREATININE 1.41 (H) 02/15/2025    CREATININE 1.38 (H) 02/10/2025    CALCIUM 8.3 (L) 02/15/2025    CALCIUM 9.0 02/10/2025    ALBUMIN 3.7 02/10/2025        Lab Results   Component Value Date    HGBA1C 9.1 (H) 02/10/2025    HGBA1C 8.6 (A) 06/14/2024       Last Cardiology Tests:  02/14/2025 - Cardiac Catheterization (LH)  1. Single vessel coronary artery disease without proximal left anterior descending involvement.  2. Severe sequential calcific stenosis of mid to distal RCA.  3. Staged PCI using atherectomy of the RCA in the following weeks.  4. Aggressive secodary prevention.    08/06/2024 - TTE  1. The left ventricular systolic function is normal, with a León's biplane calculated ejection fraction of 59%.  2. There is normal right ventricular global systolic function.    07/24/2024 - Stress  Test  1. Normal stress myocardial perfusion imaging in response to pharmacologic stress. Mild attenuation of the anterolateral segments seen on the supine images that improves with prone imaging most likely soft tissue attenuation. Well-maintained left ventricular function.   2. Abnormal due to chest pain and borderline EKG abnormalities.     09/13/2019 - Stress Test  Normal exercise stress myocardial perfusion imaging.    Diagnostic review: I have personally reviewed the result(s) of the [facility].    Assessment/Plan   1) CAD s/p Remote CABG x2 in 1997  On  mg daily, atenolol 25 mg daily, Zetia 10 mg daily, lisinopril 40 mg daily, Imdur 30 mg daily  Stress 09/2019 negative for ischemia  2/4 diastolic murmur RUSB   Stress 07/24/2024 negative for ischemia  TTE 08/06/2024 with LVEF 59%, trace-mild aortic regurgitation   [facility] 02/14/2025 with single vessel CAD with severe sequential calcific stenosis of mid to distal RCA  Plan is for staged PCI using atherectomy of the RCA in the following weeks  Extensive discuss had with patient and her daughter about findings of [facility] and recommended treatment   Educated that redo 1-vessel CABG comes with higher risk when compared to PCI with atherectomy   All questions answered to the patient and daughter's satisfaction   Continue current medical Rx   PCI with atherectomy tentatively scheduled for 03/05/2025  Patient and daughter will take some time if they wish to have a surgical consult to discuss 1-vessel CABG, although patient is leaning towards undergoing PCI.  F/U after PCI     2) HTN  Stable  On atenolol 25 mg daily, lisinopril 40 mg daily   Continue current medical Rx     3) Hyperlipidemia  Goal LDL <70  On Zetia 10 mg daily   Lipid panel 02/10/2025 with LDL of 127  Patient was previously on statin therapy, but discontinued s/t myalgias   Myalgias have persisted despite being off statin therapy   Start atorvastatin 80 mg daily       Scribe Attestation  By signing my name  below, I, Jayleen Neil   attest that this documentation has been prepared under the direction and in the presence of Surya Brewer MD.

## 2025-02-17 NOTE — TELEPHONE ENCOUNTER
"Message received from Secure Message from Dr Brewer. Patient \"[place] [facility] [place] [facility]". (Dr. Aranda will also be on this case). Will place Case Request and labs orders.  "

## 2025-02-18 ENCOUNTER — OFFICE VISIT (OUTPATIENT)
Dept: CARDIOLOGY | Facility: HOSPITAL | Age: 65
End: 2025-02-18
Payer: COMMERCIAL

## 2025-02-18 ENCOUNTER — TELEPHONE (OUTPATIENT)
Dept: CARDIOLOGY | Facility: HOSPITAL | Age: 65
End: 2025-02-18

## 2025-02-18 VITALS
DIASTOLIC BLOOD PRESSURE: 66 MMHG | HEART RATE: 65 BPM | SYSTOLIC BLOOD PRESSURE: 118 MMHG | HEIGHT: 62 IN | WEIGHT: 155 LBS | BODY MASS INDEX: 28.52 KG/M2

## 2025-02-18 DIAGNOSIS — I25.110 CORONARY ARTERY DISEASE INVOLVING NATIVE CORONARY ARTERY OF NATIVE HEART WITH UNSTABLE ANGINA PECTORIS: Primary | ICD-10-CM

## 2025-02-18 PROCEDURE — 3078F DIAST BP <80 MM HG: CPT | Performed by: INTERNAL MEDICINE

## 2025-02-18 PROCEDURE — 4010F ACE/ARB THERAPY RXD/TAKEN: CPT | Performed by: INTERNAL MEDICINE

## 2025-02-18 PROCEDURE — 99213 OFFICE O/P EST LOW 20 MIN: CPT | Performed by: INTERNAL MEDICINE

## 2025-02-18 PROCEDURE — 3074F SYST BP LT 130 MM HG: CPT | Performed by: INTERNAL MEDICINE

## 2025-02-18 PROCEDURE — 3008F BODY MASS INDEX DOCD: CPT | Performed by: INTERNAL MEDICINE

## 2025-02-18 PROCEDURE — 1036F TOBACCO NON-USER: CPT | Performed by: INTERNAL MEDICINE

## 2025-02-18 RX ORDER — ATORVASTATIN CALCIUM 40 MG/1
80 TABLET, FILM COATED ORAL DAILY
Qty: 180 TABLET | Refills: 3 | Status: SHIPPED | OUTPATIENT
Start: 2025-02-18 | End: 2026-02-18

## 2025-02-18 ASSESSMENT — ENCOUNTER SYMPTOMS
DEPRESSION: 0
LOSS OF SENSATION IN FEET: 0
OCCASIONAL FEELINGS OF UNSTEADINESS: 1

## 2025-02-18 NOTE — PATIENT INSTRUCTIONS
For your cholesterol, start atorvastatin 80 mg daily.  A prescription has been sent to your pharmacy.    Continue all other medications as prescribed.   Please let our office know if you would like to have a surgical consult for a second opinion.   Followup with Dr. Brewer following your procedure that is tentatively scheduled for March 5, 2025.    If you have any questions or cardiac concerns, please call our office at [phone].

## 2025-02-18 NOTE — TELEPHONE ENCOUNTER
Pt saw Dr. Brewer in office 2/18 - [place] [facility] [place] will need instructions call. Routing to care team.

## 2025-02-18 NOTE — TELEPHONE ENCOUNTER
Instructions Given on 02/16/2025 at 1:40pm.     Calling instructions for upcoming procedure: staged PCI at [facility]    Included review of date 03/05/2025 and arrival time   Per instruction from Qian Trinh RN [facility]: Patient should arrive at registration at 07:30. This will give her time to get prepped and start hydration no later than 08:15.     Labs completed on 02/15/2025 (No need to repeat)  GFR-42  Creat -1.41    Does patient require dye prep?  Patient does not require dye prep.    Check for medications that need to be held or adjusted:    Anticoagulation:   Patient denies taking.    Insulin: Semglee (Long) Novolog (short)    Patient takes Semglee in the morning regularly. Per Dr Brewer patient to hold Semglee day of procedure. Patient to hold Novolog morning of procedure.     Weight loss medications:   Patient denies taking.    Patient takes Lisinopril at night. Ok to take the night before.     Nothing to eat or drink after midnight. Patient takes ASA and Atenolol at night, advised ok to take the night before.      You will need a responsible adult .      Bring your photo ID, insurance cards an accurate list of the medications you are currently taking.    Wear loose, comfortable clothing.    There is a possibility of staying over night for observation so make arrangements and pack a bag with necessities for that just incase.      Patient correctly repeated instructions, verbalized understanding and is agreeable to the plan.

## 2025-02-18 NOTE — LETTER
February 18, 2025     Judy Diggs MD  [address] [facility] [address]  [place] [facility] [place] [address]    Patient: Jeanine Ghosh   YOB: 1960   Date of Visit: 2/18/2025       Dear Dr. Judy Diggs MD:    Thank you for referring Jeanine Ghosh to me for evaluation. Below are my notes for this consultation.  If you have questions, please do not hesitate to call me. I look forward to following your patient along with you.       Sincerely,     Surya Brewer MD      CC: No Recipients  ______________________________________________________________________________________    Counseling:  The patient was counseled regarding diagnostic results, instructions for management, risk factor reductions, prognosis, patient and family education, impressions, risks and benefits of treatment options and importance of compliance with treatment.      Chief Complaint:  The patient presents today for 1-month followup of chest pain s/p LHC.     History Of Present Illness:    Jeanine Ghosh is a 64 y.o. female patient who presents today in the company of her daughter for 1-month followup of chest pain s/p LHC. Her PMH is significant for CAD s/p CABG x2 in 1997, hyperlipidemia, Hashimoto's thyroiditis, DM type 1, CKD stage 3, anxiety, fibromyalgia, and reactive airways disease. LHC performed 02/14/2025 revealed revealing single vessel CAD with severe sequential calcific stenosis of mid to distal RCA. The plan is for staged PCI using atherectomy of the RCA in the following weeks. Today, the patient states that she is feeling relatively well, having recovered from her heart cath.     Past Surgical History:  She has a past surgical history that includes Other surgical history (06/15/2022); Other surgical history (08/26/2019); Other surgical history (08/26/2019); Other surgical history (08/26/2019); Other surgical history (08/26/2019); Coronary artery bypass graft (03/13/2018); Other surgical history  "(07/07/2022); and Cardiac catheterization (N/A, 2/14/2025).      Social History:  She reports that she has never smoked. She has never been exposed to tobacco smoke. She has never used smokeless tobacco. She reports that she does not currently use alcohol. She reports that she does not currently use drugs.    Family History:  Family History   Problem Relation Name Age of Onset   • COPD Brother          Allergies:  Animal dander and Morphine    Outpatient Medications:  Current Outpatient Medications   Medication Instructions   • aspirin 325 mg tablet 1 tablet, Daily   • atenolol (TENORMIN) 50 mg, oral, Daily, as directed   • DULoxetine (CYMBALTA) 20 mg, oral, 2 times daily, Do not crush or chew.   • ezetimibe (ZETIA) 10 mg, oral, Daily   • flash glucose sensor kit (FreeStyle Mando 2 Sensor) kit For continuous glucose monitoring.  Cool every 14 days   • insulin aspart (NovoLOG) 100 unit/mL (3 mL) pen Inject 1 unit for every 10 grams of carb.  Max of 60 units per day   • insulin glargine-yfgn (Semglee,insulin glargine-yfgn,) 100 unit/mL vial Every 24 hours   • isosorbide mononitrate ER (IMDUR) 30 mg, oral, Daily, Do not crush or chew.   • levothyroxine (SYNTHROID, LEVOXYL) 50 mcg, oral, Daily, Take on an empty stomach.   • lisinopril 40 mg, oral, Daily   • Semglee(insulin glarg-yfgn)Pen 12 Units, subcutaneous, Every morning        Last Recorded Vitals:  Vitals:    02/18/25 1511   BP: 118/66   BP Location: Right arm   Pulse: 65   Weight: 70.3 kg (155 lb)   Height: 1.575 m (5' 2\")       Review of Systems   All other systems reviewed and are negative.     Physical Exam:  Constitutional:       Appearance: Healthy appearance. Not in distress.   Neck:      Vascular: No JVR. JVD normal.   Pulmonary:      Effort: Pulmonary effort is normal.      Breath sounds: Normal breath sounds. No wheezing. No rhonchi. No rales.   Chest:      Chest wall: Not tender to palpatation.   Cardiovascular:      PMI at left midclavicular line. " Normal rate. Regular rhythm. Normal S1. Normal S2.       Murmurs: There is a grade 2/4 diastolic murmur at the URSB.      No gallop.  No click. No rub.   Pulses:     Intact distal pulses.   Edema:     Peripheral edema absent.   Abdominal:      General: Bowel sounds are normal.      Palpations: Abdomen is soft.      Tenderness: There is no abdominal tenderness.   Musculoskeletal: Normal range of motion.         General: No tenderness. Skin:     General: Skin is warm and dry.   Neurological:      General: No focal deficit present.      Mental Status: Alert and oriented to person, place and time.            Last Labs:  CBC -  Lab Results   Component Value Date    WBC 5.5 02/15/2025    WBC 4.6 02/10/2025    HGB 11.3 (L) 02/15/2025    HGB 13.2 02/10/2025    HCT 34.6 (L) 02/15/2025    HCT 40.5 02/10/2025    MCV 97 02/15/2025    MCV 95.7 02/10/2025     02/15/2025     02/10/2025       CMP -  Lab Results   Component Value Date    CALCIUM 8.3 (L) 02/15/2025    CALCIUM 9.0 02/10/2025    PROT 6.1 02/10/2025    ALBUMIN 3.7 02/10/2025    AST 16 02/10/2025    ALT 17 02/10/2025    ALKPHOS 60 02/10/2025    BILITOT 0.5 02/10/2025       LIPID PANEL -   Lab Results   Component Value Date    CHOL 213 (H) 02/10/2025    TRIG 90 02/10/2025    HDL 67 02/10/2025    CHHDL 3.2 02/10/2025    LDLF 102 (H) 07/08/2022    VLDL 16 02/05/2024    NHDL 146 (H) 02/10/2025       RENAL FUNCTION PANEL -   Lab Results   Component Value Date    GLUCOSE 327 (H) 02/15/2025    GLUCOSE 131 (H) 02/10/2025     02/15/2025     02/10/2025    K 4.4 02/15/2025    K 5.0 02/10/2025     02/15/2025     02/10/2025    CO2 23 02/15/2025    CO2 24 02/10/2025    ANIONGAP 12 02/15/2025    ANIONGAP 8 02/10/2025    BUN 40 (H) 02/15/2025    BUN 40 (H) 02/10/2025    CREATININE 1.41 (H) 02/15/2025    CREATININE 1.38 (H) 02/10/2025    CALCIUM 8.3 (L) 02/15/2025    CALCIUM 9.0 02/10/2025    ALBUMIN 3.7 02/10/2025        Lab Results   Component  Value Date    HGBA1C 9.1 (H) 02/10/2025    HGBA1C 8.6 (A) 06/14/2024       Last Cardiology Tests:  02/14/2025 - Cardiac Catheterization (LH)  1. Single vessel coronary artery disease without proximal left anterior descending involvement.  2. Severe sequential calcific stenosis of mid to distal RCA.  3. Staged PCI using atherectomy of the RCA in the following weeks.  4. Aggressive secodary prevention.    08/06/2024 - TTE  1. The left ventricular systolic function is normal, with a León's biplane calculated ejection fraction of 59%.  2. There is normal right ventricular global systolic function.    07/24/2024 - Stress Test  1. Normal stress myocardial perfusion imaging in response to pharmacologic stress. Mild attenuation of the anterolateral segments seen on the supine images that improves with prone imaging most likely soft tissue attenuation. Well-maintained left ventricular function.   2. Abnormal due to chest pain and borderline EKG abnormalities.     09/13/2019 - Stress Test  Normal exercise stress myocardial perfusion imaging.    Diagnostic review: I have personally reviewed the result(s) of the [facility].    Assessment/Plan  1) CAD s/p Remote CABG x2 in 1997  On  mg daily, atenolol 25 mg daily, Zetia 10 mg daily, lisinopril 40 mg daily, Imdur 30 mg daily  Stress 09/2019 negative for ischemia  2/4 diastolic murmur RUSB   Stress 07/24/2024 negative for ischemia  TTE 08/06/2024 with LVEF 59%, trace-mild aortic regurgitation   [facility] 02/14/2025 with single vessel CAD with severe sequential calcific stenosis of mid to distal RCA  Plan is for staged PCI using atherectomy of the RCA in the following weeks  Extensive discuss had with patient and her daughter about findings of [facility] and recommended treatment   Educated that redo 1-vessel CABG comes with higher risk when compared to PCI with atherectomy   All questions answered to the patient and daughter's satisfaction   Continue current medical Rx   PCI with  atherectomy tentatively scheduled for 03/05/2025  Patient and daughter will take some time if they wish to have a surgical consult to discuss 1-vessel CABG, although patient is leaning towards undergoing PCI.  F/U after PCI     2) HTN  Stable  On atenolol 25 mg daily, lisinopril 40 mg daily   Continue current medical Rx     3) Hyperlipidemia  Goal LDL <70  On Zetia 10 mg daily   Lipid panel 02/10/2025 with LDL of 127  Patient was previously on statin therapy, but discontinued s/t myalgias   Myalgias have persisted despite being off statin therapy   Start atorvastatin 80 mg daily       Scribe Attestation  By signing my name below, I, Jayleen Neil   attest that this documentation has been prepared under the direction and in the presence of Surya Brewer MD.

## 2025-02-19 ENCOUNTER — TELEPHONE (OUTPATIENT)
Dept: CARDIOLOGY | Facility: HOSPITAL | Age: 65
End: 2025-02-19
Payer: COMMERCIAL

## 2025-02-19 NOTE — TELEPHONE ENCOUNTER
VM left on direct line requesting return call from a [employer] update received regarding 3/5 procedure. Forwarding to nursing team for return call.   Victorino

## 2025-02-20 ENCOUNTER — TELEPHONE (OUTPATIENT)
Dept: CARDIOLOGY | Facility: HOSPITAL | Age: 65
End: 2025-02-20
Payer: COMMERCIAL

## 2025-02-20 NOTE — TELEPHONE ENCOUNTER
Pt called regarding atorvastatin dosage- was told one dose but dose on bottle does not match what was told. Pt would like to speak to care team.

## 2025-02-20 NOTE — TELEPHONE ENCOUNTER
PT called regarding atorvastatin dosage- was told one dose but when picked up from pharmacy dose was different on bottle. PT would like to speak with care team.   
Spoke to patient. She was confused why the Rx was Atorvastatin 40mg 2 tabs daily and then she received an 80mg tablet.     Advised patient to take the one 80mg daily and should pharmacy fill it with two 40mg tablets in the future, she should take two to equal Atorvastatin 80mg daily.   
Stable

## 2025-02-24 ENCOUNTER — TELEPHONE (OUTPATIENT)
Dept: CARDIOLOGY | Facility: HOSPITAL | Age: 65
End: 2025-02-24
Payer: COMMERCIAL

## 2025-02-24 NOTE — TELEPHONE ENCOUNTER
Spoke to patient. States she is having some overall aches she believes this to be from the increase of the statin. She would like to give it a fair shot before switching to an alternative.

## 2025-02-26 DIAGNOSIS — I10 BENIGN ESSENTIAL HYPERTENSION: ICD-10-CM

## 2025-02-27 RX ORDER — ATENOLOL 50 MG/1
50 TABLET ORAL DAILY
Qty: 90 TABLET | Refills: 1 | Status: SHIPPED | OUTPATIENT
Start: 2025-02-27 | End: 2025-08-26

## 2025-02-27 NOTE — TELEPHONE ENCOUNTER
----- Message from Nurse Sujata DEGROOT sent at 2/26/2025  2:29 PM EST -----  Regarding: Refill  Patient is requesting a refill of atenolol 50 mg to be sent to [facility] in [place]. Patient is requesting 90 days at a time if possible.

## 2025-03-04 ENCOUNTER — TELEPHONE (OUTPATIENT)
Dept: CARDIOLOGY | Facility: HOSPITAL | Age: 65
End: 2025-03-04
Payer: COMMERCIAL

## 2025-03-04 NOTE — TELEPHONE ENCOUNTER
Call received from patient stating she is confused about what time to arrive for [facility] procedure tomorrow.     Contacted [facility] team. Per Qian Trinh and Anjana Soriano, patient to arrive at 8am.    Patient given arrival information and verbalized understanding.

## 2025-03-05 ENCOUNTER — HOSPITAL ENCOUNTER (OUTPATIENT)
Facility: HOSPITAL | Age: 65
Discharge: HOME | End: 2025-03-06
Attending: INTERNAL MEDICINE | Admitting: INTERNAL MEDICINE
Payer: COMMERCIAL

## 2025-03-05 DIAGNOSIS — I20.0 UNSTABLE ANGINA (MULTI): ICD-10-CM

## 2025-03-05 DIAGNOSIS — Z95.5 STATUS POST INSERTION OF DRUG-ELUTING STENT INTO LEFT ANTERIOR DESCENDING (LAD) ARTERY: Primary | ICD-10-CM

## 2025-03-05 DIAGNOSIS — I25.10 CORONARY ARTERY STENOSIS: ICD-10-CM

## 2025-03-05 DIAGNOSIS — I73.9 PERIPHERAL VASCULAR DISEASE, UNSPECIFIED (CMS-HCC): ICD-10-CM

## 2025-03-05 DIAGNOSIS — I25.110 CORONARY ARTERY DISEASE INVOLVING NATIVE CORONARY ARTERY OF NATIVE HEART WITH UNSTABLE ANGINA PECTORIS: ICD-10-CM

## 2025-03-05 LAB
ACT BLD: 268 SEC (ref 83–199)
ACT BLD: 314 SEC (ref 83–199)
ACT BLD: >397 SEC (ref 83–199)
GLUCOSE BLD MANUAL STRIP-MCNC: 210 MG/DL (ref 74–99)
GLUCOSE BLD MANUAL STRIP-MCNC: 369 MG/DL (ref 74–99)
GLUCOSE BLD MANUAL STRIP-MCNC: 448 MG/DL (ref 74–99)

## 2025-03-05 PROCEDURE — 2500000002 HC RX 250 W HCPCS SELF ADMINISTERED DRUGS (ALT 637 FOR MEDICARE OP, ALT 636 FOR OP/ED)

## 2025-03-05 PROCEDURE — 96373 THER/PROPH/DIAG INJ IA: CPT | Performed by: INTERNAL MEDICINE

## 2025-03-05 PROCEDURE — C1887 CATHETER, GUIDING: HCPCS | Performed by: INTERNAL MEDICINE

## 2025-03-05 PROCEDURE — 85347 COAGULATION TIME ACTIVATED: CPT | Performed by: INTERNAL MEDICINE

## 2025-03-05 PROCEDURE — 92978 ENDOLUMINL IVUS OCT C 1ST: CPT | Performed by: INTERNAL MEDICINE

## 2025-03-05 PROCEDURE — 2780000003 HC OR 278 NO HCPCS: Performed by: INTERNAL MEDICINE

## 2025-03-05 PROCEDURE — C9602 PERC D-E COR STENT ATHER S: HCPCS | Mod: 22,RC | Performed by: INTERNAL MEDICINE

## 2025-03-05 PROCEDURE — 99152 MOD SED SAME PHYS/QHP 5/>YRS: CPT | Performed by: INTERNAL MEDICINE

## 2025-03-05 PROCEDURE — 92933 PRQ TRLML C ATHRC ST ANGIOP1: CPT | Performed by: INTERNAL MEDICINE

## 2025-03-05 PROCEDURE — C1760 CLOSURE DEV, VASC: HCPCS | Performed by: INTERNAL MEDICINE

## 2025-03-05 PROCEDURE — C1753 CATH, INTRAVAS ULTRASOUND: HCPCS | Performed by: INTERNAL MEDICINE

## 2025-03-05 PROCEDURE — 2550000001 HC RX 255 CONTRASTS: Performed by: INTERNAL MEDICINE

## 2025-03-05 PROCEDURE — C1874 STENT, COATED/COV W/DEL SYS: HCPCS | Performed by: INTERNAL MEDICINE

## 2025-03-05 PROCEDURE — C1769 GUIDE WIRE: HCPCS | Performed by: INTERNAL MEDICINE

## 2025-03-05 PROCEDURE — G0378 HOSPITAL OBSERVATION PER HR: HCPCS

## 2025-03-05 PROCEDURE — C1756 CATH, PACING, TRANSESOPH: HCPCS | Performed by: INTERNAL MEDICINE

## 2025-03-05 PROCEDURE — 33210 INSERT ELECTRD/PM CATH SNGL: CPT | Performed by: INTERNAL MEDICINE

## 2025-03-05 PROCEDURE — 2500000004 HC RX 250 GENERAL PHARMACY W/ HCPCS (ALT 636 FOR OP/ED): Performed by: INTERNAL MEDICINE

## 2025-03-05 PROCEDURE — 92978 ENDOLUMINL IVUS OCT C 1ST: CPT | Mod: RC | Performed by: INTERNAL MEDICINE

## 2025-03-05 PROCEDURE — 99153 MOD SED SAME PHYS/QHP EA: CPT | Performed by: INTERNAL MEDICINE

## 2025-03-05 PROCEDURE — C1724 CATH, TRANS ATHEREC,ROTATION: HCPCS | Performed by: INTERNAL MEDICINE

## 2025-03-05 PROCEDURE — 2500000004 HC RX 250 GENERAL PHARMACY W/ HCPCS (ALT 636 FOR OP/ED)

## 2025-03-05 PROCEDURE — C1725 CATH, TRANSLUMIN NON-LASER: HCPCS | Performed by: INTERNAL MEDICINE

## 2025-03-05 PROCEDURE — C1894 INTRO/SHEATH, NON-LASER: HCPCS | Performed by: INTERNAL MEDICINE

## 2025-03-05 PROCEDURE — 82947 ASSAY GLUCOSE BLOOD QUANT: CPT

## 2025-03-05 PROCEDURE — 85347 COAGULATION TIME ACTIVATED: CPT

## 2025-03-05 PROCEDURE — 2720000007 HC OR 272 NO HCPCS: Performed by: INTERNAL MEDICINE

## 2025-03-05 PROCEDURE — 2500000001 HC RX 250 WO HCPCS SELF ADMINISTERED DRUGS (ALT 637 FOR MEDICARE OP)

## 2025-03-05 DEVICE — EVEROLIMUS-ELUTING PLATINUM CHROMIUM CORONARY STENT SYSTEM
Type: IMPLANTABLE DEVICE | Site: HEART | Status: FUNCTIONAL
Brand: SYNERGY™ XD

## 2025-03-05 DEVICE — FLOW DIRECTED PACING CATHETER
Type: IMPLANTABLE DEVICE | Site: HEART | Status: NON-FUNCTIONAL
Brand: PACEL™

## 2025-03-05 RX ORDER — NAPROXEN SODIUM 220 MG/1
325 TABLET, FILM COATED ORAL DAILY
Status: DISCONTINUED | OUTPATIENT
Start: 2025-03-05 | End: 2025-03-05

## 2025-03-05 RX ORDER — LEVOTHYROXINE SODIUM 50 UG/1
50 TABLET ORAL DAILY
Status: DISCONTINUED | OUTPATIENT
Start: 2025-03-05 | End: 2025-03-06 | Stop reason: HOSPADM

## 2025-03-05 RX ORDER — ACETAMINOPHEN 160 MG/5ML
650 SOLUTION ORAL EVERY 4 HOURS PRN
Status: DISCONTINUED | OUTPATIENT
Start: 2025-03-05 | End: 2025-03-06 | Stop reason: HOSPADM

## 2025-03-05 RX ORDER — ACETAMINOPHEN 325 MG/1
650 TABLET ORAL EVERY 6 HOURS PRN
Status: DISCONTINUED | OUTPATIENT
Start: 2025-03-05 | End: 2025-03-06 | Stop reason: HOSPADM

## 2025-03-05 RX ORDER — ACETAMINOPHEN 650 MG/1
650 SUPPOSITORY RECTAL EVERY 6 HOURS PRN
Status: DISCONTINUED | OUTPATIENT
Start: 2025-03-05 | End: 2025-03-06 | Stop reason: HOSPADM

## 2025-03-05 RX ORDER — LIDOCAINE HYDROCHLORIDE AND EPINEPHRINE 10; 20 UG/ML; MG/ML
INJECTION, SOLUTION INFILTRATION; PERINEURAL
Status: DISPENSED
Start: 2025-03-05 | End: 2025-03-06

## 2025-03-05 RX ORDER — ATENOLOL 50 MG/1
50 TABLET ORAL DAILY
Status: DISCONTINUED | OUTPATIENT
Start: 2025-03-05 | End: 2025-03-06 | Stop reason: HOSPADM

## 2025-03-05 RX ORDER — FENTANYL CITRATE 50 UG/ML
INJECTION, SOLUTION INTRAMUSCULAR; INTRAVENOUS AS NEEDED
Status: DISCONTINUED | OUTPATIENT
Start: 2025-03-05 | End: 2025-03-05 | Stop reason: HOSPADM

## 2025-03-05 RX ORDER — LIDOCAINE HYDROCHLORIDE 10 MG/ML
INJECTION, SOLUTION EPIDURAL; INFILTRATION; INTRACAUDAL; PERINEURAL AS NEEDED
Status: DISCONTINUED | OUTPATIENT
Start: 2025-03-05 | End: 2025-03-05 | Stop reason: HOSPADM

## 2025-03-05 RX ORDER — EZETIMIBE 10 MG/1
10 TABLET ORAL DAILY
Status: DISCONTINUED | OUTPATIENT
Start: 2025-03-05 | End: 2025-03-06 | Stop reason: HOSPADM

## 2025-03-05 RX ORDER — ACETAMINOPHEN 325 MG/1
650 TABLET ORAL EVERY 4 HOURS PRN
Status: DISCONTINUED | OUTPATIENT
Start: 2025-03-05 | End: 2025-03-06 | Stop reason: HOSPADM

## 2025-03-05 RX ORDER — DULOXETIN HYDROCHLORIDE 20 MG/1
20 CAPSULE, DELAYED RELEASE ORAL 2 TIMES DAILY
Status: DISCONTINUED | OUTPATIENT
Start: 2025-03-05 | End: 2025-03-06 | Stop reason: HOSPADM

## 2025-03-05 RX ORDER — MIDAZOLAM HYDROCHLORIDE 1 MG/ML
INJECTION, SOLUTION INTRAMUSCULAR; INTRAVENOUS AS NEEDED
Status: DISCONTINUED | OUTPATIENT
Start: 2025-03-05 | End: 2025-03-05 | Stop reason: HOSPADM

## 2025-03-05 RX ORDER — ONDANSETRON HYDROCHLORIDE 2 MG/ML
4 INJECTION, SOLUTION INTRAVENOUS ONCE
Status: COMPLETED | OUTPATIENT
Start: 2025-03-05 | End: 2025-03-05

## 2025-03-05 RX ORDER — LISINOPRIL 20 MG/1
40 TABLET ORAL DAILY
Status: DISCONTINUED | OUTPATIENT
Start: 2025-03-05 | End: 2025-03-06 | Stop reason: HOSPADM

## 2025-03-05 RX ORDER — ATORVASTATIN CALCIUM 80 MG/1
80 TABLET, FILM COATED ORAL DAILY
Status: DISCONTINUED | OUTPATIENT
Start: 2025-03-05 | End: 2025-03-06 | Stop reason: HOSPADM

## 2025-03-05 RX ORDER — CLOPIDOGREL BISULFATE 300 MG/1
600 TABLET, FILM COATED ORAL ONCE
Status: COMPLETED | OUTPATIENT
Start: 2025-03-05 | End: 2025-03-05

## 2025-03-05 RX ORDER — ISOSORBIDE MONONITRATE 30 MG/1
30 TABLET, EXTENDED RELEASE ORAL DAILY
Status: DISCONTINUED | OUTPATIENT
Start: 2025-03-05 | End: 2025-03-06 | Stop reason: HOSPADM

## 2025-03-05 RX ORDER — LIDOCAINE HYDROCHLORIDE AND EPINEPHRINE 10; 20 UG/ML; MG/ML
20 INJECTION, SOLUTION INFILTRATION; PERINEURAL ONCE
Status: DISCONTINUED | OUTPATIENT
Start: 2025-03-05 | End: 2025-03-06 | Stop reason: HOSPADM

## 2025-03-05 RX ORDER — ACETAMINOPHEN 160 MG/5ML
650 SOLUTION ORAL EVERY 6 HOURS PRN
Status: DISCONTINUED | OUTPATIENT
Start: 2025-03-05 | End: 2025-03-06 | Stop reason: HOSPADM

## 2025-03-05 RX ORDER — ACETAMINOPHEN 650 MG/1
650 SUPPOSITORY RECTAL EVERY 4 HOURS PRN
Status: DISCONTINUED | OUTPATIENT
Start: 2025-03-05 | End: 2025-03-06 | Stop reason: HOSPADM

## 2025-03-05 RX ORDER — DEXTROSE 50 % IN WATER (D50W) INTRAVENOUS SYRINGE
25
Status: DISCONTINUED | OUTPATIENT
Start: 2025-03-05 | End: 2025-03-06 | Stop reason: HOSPADM

## 2025-03-05 RX ORDER — HEPARIN SODIUM 1000 [USP'U]/ML
INJECTION, SOLUTION INTRAVENOUS; SUBCUTANEOUS AS NEEDED
Status: DISCONTINUED | OUTPATIENT
Start: 2025-03-05 | End: 2025-03-05 | Stop reason: HOSPADM

## 2025-03-05 RX ORDER — CLOPIDOGREL BISULFATE 75 MG/1
75 TABLET ORAL DAILY
Status: DISCONTINUED | OUTPATIENT
Start: 2025-03-06 | End: 2025-03-06

## 2025-03-05 RX ORDER — DEXTROSE 50 % IN WATER (D50W) INTRAVENOUS SYRINGE
12.5
Status: DISCONTINUED | OUTPATIENT
Start: 2025-03-05 | End: 2025-03-06 | Stop reason: HOSPADM

## 2025-03-05 RX ORDER — INSULIN DEGLUDEC 100 U/ML
12 INJECTION, SOLUTION SUBCUTANEOUS EVERY 24 HOURS
Status: DISCONTINUED | OUTPATIENT
Start: 2025-03-06 | End: 2025-03-06 | Stop reason: HOSPADM

## 2025-03-05 RX ORDER — ASPIRIN 81 MG/1
81 TABLET ORAL DAILY
Status: DISCONTINUED | OUTPATIENT
Start: 2025-03-06 | End: 2025-03-06 | Stop reason: HOSPADM

## 2025-03-05 RX ORDER — INSULIN LISPRO 100 [IU]/ML
0-5 INJECTION, SOLUTION INTRAVENOUS; SUBCUTANEOUS
Status: DISCONTINUED | OUTPATIENT
Start: 2025-03-05 | End: 2025-03-06 | Stop reason: HOSPADM

## 2025-03-05 RX ORDER — SODIUM CHLORIDE 9 MG/ML
INJECTION, SOLUTION INTRAVENOUS CONTINUOUS PRN
Status: COMPLETED | OUTPATIENT
Start: 2025-03-05 | End: 2025-03-05

## 2025-03-05 RX ADMIN — INSULIN HUMAN 10 UNITS: 100 INJECTION, SOLUTION PARENTERAL at 23:56

## 2025-03-05 RX ADMIN — CLOPIDOGREL BISULFATE 600 MG: 300 TABLET, FILM COATED ORAL at 09:53

## 2025-03-05 RX ADMIN — EZETIMIBE 10 MG: 10 TABLET ORAL at 20:09

## 2025-03-05 RX ADMIN — DULOXETINE 20 MG: 20 CAPSULE, DELAYED RELEASE ORAL at 20:09

## 2025-03-05 RX ADMIN — INSULIN LISPRO 5 UNITS: 100 INJECTION, SOLUTION INTRAVENOUS; SUBCUTANEOUS at 16:46

## 2025-03-05 RX ADMIN — ONDANSETRON 4 MG: 2 INJECTION INTRAMUSCULAR; INTRAVENOUS at 15:19

## 2025-03-05 RX ADMIN — ATORVASTATIN CALCIUM 80 MG: 80 TABLET, FILM COATED ORAL at 20:09

## 2025-03-05 RX ADMIN — ASPIRIN 81 MG CHEWABLE TABLET 324 MG: 81 TABLET CHEWABLE at 09:53

## 2025-03-05 RX ADMIN — SODIUM CHLORIDE 1000 ML: 0.9 INJECTION, SOLUTION INTRAVENOUS at 09:30

## 2025-03-05 SDOH — ECONOMIC STABILITY: HOUSING INSECURITY: IN THE PAST 12 MONTHS, HOW MANY TIMES HAVE YOU MOVED WHERE YOU WERE LIVING?: 1

## 2025-03-05 SDOH — SOCIAL STABILITY: SOCIAL INSECURITY: HAVE YOU HAD ANY THOUGHTS OF HARMING ANYONE ELSE?: NO

## 2025-03-05 SDOH — ECONOMIC STABILITY: HOUSING INSECURITY: IN THE LAST 12 MONTHS, WAS THERE A TIME WHEN YOU WERE NOT ABLE TO PAY THE MORTGAGE OR RENT ON TIME?: NO

## 2025-03-05 SDOH — ECONOMIC STABILITY: HOUSING INSECURITY: AT ANY TIME IN THE PAST 12 MONTHS, WERE YOU HOMELESS OR LIVING IN A SHELTER (INCLUDING NOW)?: NO

## 2025-03-05 SDOH — ECONOMIC STABILITY: FOOD INSECURITY: WITHIN THE PAST 12 MONTHS, THE FOOD YOU BOUGHT JUST DIDN'T LAST AND YOU DIDN'T HAVE MONEY TO GET MORE.: NEVER TRUE

## 2025-03-05 SDOH — SOCIAL STABILITY: SOCIAL INSECURITY: HAS ANYONE EVER THREATENED TO HURT YOUR FAMILY OR YOUR PETS?: NO

## 2025-03-05 SDOH — ECONOMIC STABILITY: FOOD INSECURITY: WITHIN THE PAST 12 MONTHS, YOU WORRIED THAT YOUR FOOD WOULD RUN OUT BEFORE YOU GOT THE MONEY TO BUY MORE.: NEVER TRUE

## 2025-03-05 SDOH — SOCIAL STABILITY: SOCIAL INSECURITY: ARE YOU OR HAVE YOU BEEN THREATENED OR ABUSED PHYSICALLY, EMOTIONALLY, OR SEXUALLY BY ANYONE?: NO

## 2025-03-05 SDOH — SOCIAL STABILITY: SOCIAL INSECURITY: DO YOU FEEL UNSAFE GOING BACK TO THE PLACE WHERE YOU ARE LIVING?: NO

## 2025-03-05 SDOH — SOCIAL STABILITY: SOCIAL INSECURITY: WITHIN THE LAST YEAR, HAVE YOU BEEN HUMILIATED OR EMOTIONALLY ABUSED IN OTHER WAYS BY YOUR PARTNER OR EX-PARTNER?: NO

## 2025-03-05 SDOH — ECONOMIC STABILITY: INCOME INSECURITY: IN THE PAST 12 MONTHS HAS THE ELECTRIC, GAS, OIL, OR WATER COMPANY THREATENED TO SHUT OFF SERVICES IN YOUR HOME?: NO

## 2025-03-05 SDOH — SOCIAL STABILITY: SOCIAL INSECURITY: WITHIN THE LAST YEAR, HAVE YOU BEEN AFRAID OF YOUR PARTNER OR EX-PARTNER?: NO

## 2025-03-05 SDOH — ECONOMIC STABILITY: TRANSPORTATION INSECURITY: IN THE PAST 12 MONTHS, HAS LACK OF TRANSPORTATION KEPT YOU FROM MEDICAL APPOINTMENTS OR FROM GETTING MEDICATIONS?: NO

## 2025-03-05 SDOH — SOCIAL STABILITY: SOCIAL INSECURITY: DO YOU FEEL ANYONE HAS EXPLOITED OR TAKEN ADVANTAGE OF YOU FINANCIALLY OR OF YOUR PERSONAL PROPERTY?: NO

## 2025-03-05 SDOH — SOCIAL STABILITY: SOCIAL INSECURITY: DOES ANYONE TRY TO KEEP YOU FROM HAVING/CONTACTING OTHER FRIENDS OR DOING THINGS OUTSIDE YOUR HOME?: NO

## 2025-03-05 SDOH — ECONOMIC STABILITY: FOOD INSECURITY: HOW HARD IS IT FOR YOU TO PAY FOR THE VERY BASICS LIKE FOOD, HOUSING, MEDICAL CARE, AND HEATING?: NOT HARD AT ALL

## 2025-03-05 SDOH — SOCIAL STABILITY: SOCIAL INSECURITY: ARE THERE ANY APPARENT SIGNS OF INJURIES/BEHAVIORS THAT COULD BE RELATED TO ABUSE/NEGLECT?: NO

## 2025-03-05 SDOH — SOCIAL STABILITY: SOCIAL INSECURITY: WERE YOU ABLE TO COMPLETE ALL THE BEHAVIORAL HEALTH SCREENINGS?: YES

## 2025-03-05 SDOH — SOCIAL STABILITY: SOCIAL INSECURITY: HAVE YOU HAD THOUGHTS OF HARMING ANYONE ELSE?: NO

## 2025-03-05 SDOH — SOCIAL STABILITY: SOCIAL INSECURITY: ABUSE: ADULT

## 2025-03-05 ASSESSMENT — COGNITIVE AND FUNCTIONAL STATUS - GENERAL
DAILY ACTIVITIY SCORE: 24
DAILY ACTIVITIY SCORE: 24
MOBILITY SCORE: 24
MOBILITY SCORE: 24
PATIENT BASELINE BEDBOUND: NO

## 2025-03-05 ASSESSMENT — LIFESTYLE VARIABLES
SKIP TO QUESTIONS 9-10: 1
HOW MANY STANDARD DRINKS CONTAINING ALCOHOL DO YOU HAVE ON A TYPICAL DAY: PATIENT DOES NOT DRINK
HOW OFTEN DO YOU HAVE 6 OR MORE DRINKS ON ONE OCCASION: NEVER
SUBSTANCE_ABUSE_PAST_12_MONTHS: NO
HOW OFTEN DO YOU HAVE A DRINK CONTAINING ALCOHOL: NEVER
AUDIT-C TOTAL SCORE: 0
PRESCIPTION_ABUSE_PAST_12_MONTHS: NO
AUDIT-C TOTAL SCORE: 0

## 2025-03-05 ASSESSMENT — ACTIVITIES OF DAILY LIVING (ADL)
JUDGMENT_ADEQUATE_SAFELY_COMPLETE_DAILY_ACTIVITIES: YES
WALKS IN HOME: INDEPENDENT
TOILETING: INDEPENDENT
BATHING: INDEPENDENT
LACK_OF_TRANSPORTATION: NO
HEARING - RIGHT EAR: FUNCTIONAL
GROOMING: INDEPENDENT
PATIENT'S MEMORY ADEQUATE TO SAFELY COMPLETE DAILY ACTIVITIES?: YES
ADEQUATE_TO_COMPLETE_ADL: YES
DRESSING YOURSELF: INDEPENDENT
HEARING - LEFT EAR: FUNCTIONAL
FEEDING YOURSELF: INDEPENDENT

## 2025-03-05 ASSESSMENT — PAIN - FUNCTIONAL ASSESSMENT
PAIN_FUNCTIONAL_ASSESSMENT: 0-10
PAIN_FUNCTIONAL_ASSESSMENT: 0-10

## 2025-03-05 ASSESSMENT — PATIENT HEALTH QUESTIONNAIRE - PHQ9
1. LITTLE INTEREST OR PLEASURE IN DOING THINGS: NOT AT ALL
SUM OF ALL RESPONSES TO PHQ9 QUESTIONS 1 & 2: 0
2. FEELING DOWN, DEPRESSED OR HOPELESS: NOT AT ALL

## 2025-03-05 ASSESSMENT — PAIN SCALES - GENERAL
PAINLEVEL_OUTOF10: 3
PAINLEVEL_OUTOF10: 0 - NO PAIN

## 2025-03-05 ASSESSMENT — COLUMBIA-SUICIDE SEVERITY RATING SCALE - C-SSRS
1. IN THE PAST MONTH, HAVE YOU WISHED YOU WERE DEAD OR WISHED YOU COULD GO TO SLEEP AND NOT WAKE UP?: NO
6. HAVE YOU EVER DONE ANYTHING, STARTED TO DO ANYTHING, OR PREPARED TO DO ANYTHING TO END YOUR LIFE?: NO
2. HAVE YOU ACTUALLY HAD ANY THOUGHTS OF KILLING YOURSELF?: NO

## 2025-03-05 NOTE — CARE PLAN
The patient's goals for the shift include Have no pain    The clinical goals for the shift include No bleeding from incision site    Over the shift, the patient did not make progress toward the following goals. Barriers to progression include ***. Recommendations to address these barriers include ***.

## 2025-03-05 NOTE — Clinical Note
Angioplasty of the middle right coronary artery lesion. Inflation 1: Pressure = 18 cristian; Duration = 15 sec. Inflation 2: Pressure = 18 cristian; Duration = 15 sec. Inflation 3: Pressure = 18 cristian; Duration = 15 sec.

## 2025-03-05 NOTE — Clinical Note
Guidewire exchanged with GUIDEWIRE, STRAIGHT, HI-TORQUE BALANCE MIDDLEWEIGHT, 0.014 IN X 300 CM, Think [employer].

## 2025-03-05 NOTE — Clinical Note
Atherectomy performed in the distal right coronary artery. Rotational atherectomy was performed. Pass 1 rate = 15 RPM. Pass 1 duration = 160 seconds. Pass 2 rate = 15 RPM. Pass 2 duration = 160 seconds.

## 2025-03-05 NOTE — Clinical Note
Guidewire exchanged with GUIDEWIRE, HI-TORQUE  200, .014IN X 300CM, STRAIGHT. The guidewire was removed due to: unable to cross lesion.

## 2025-03-05 NOTE — Clinical Note
Angioplasty of the distal right coronary artery lesion. Inflation 1: Pressure = 18 cristian; Duration = 15 sec. Inflation 2: Pressure = 18 cristian; Duration = 15 sec.

## 2025-03-05 NOTE — POST-PROCEDURE NOTE
Physician Transition of Care Summary  Invasive Cardiovascular Lab    Procedure Date: 3/5/2025  Attending:    * Surya Brewer - Primary  Resident/Fellow/Other Assistant: Surgeons and Role:     * Randy Dacosta MD - Fellow     * Moises Aranda MD - Fellow    Indications:   Pre-op Diagnosis      * Coronary artery disease involving native coronary artery of native heart with unstable angina pectoris [I25.110]     * Coronary artery stenosis [I25.10]    Post-procedure diagnosis:   Post-op Diagnosis     * Coronary artery disease involving native coronary artery of native heart with unstable angina pectoris [I25.110]     * Coronary artery stenosis [I25.10]    Procedure(s):   PCI  00270 - [place] PRQ TRLUML CORONRY STENT/ATH/ANGIO ONE ART/BRNCH    Atherectomy - Coronary  87298 - [place] PRQ TRLUML CORONARY ANGIO/ATHERECT ONE ART/BRNCH    IVUS - Coronary  00370 - [place] ENDOLUMINAL CORONARY IVUS OCT I&R INITIAL VESSEL    PCI YASMANY Stent- Coronary  89045 - [place] PRQ TRLUML CORONRY CHRONIC OCCLUS REVASC ONE VSL        Procedure Findings:   Right ulnar artery, USS guided   Rotablation and IVUS guided PCI of the RCA    Pre emptive temporary wire through the right internal jugular vein    Description of the Procedure:   TR band     Complications:   Nil     Stents/Implants:   Implants       Pacemaker    Catheter, Pacing, Pacel, Flow Directed, 5 Fr X 110 Cm - [id number] - Used, Not Implanted        Inventory item: CATHETER, PACING, PACEL, FLOW DIRECTED, 5 FR X 110 CM Model/Cat number: [id number]    : [employer] Lot number: [id number]    Device identifier: [id number] Implant Date: 3/5/2025      GUDID Information       Request status Successful        Brand name: Pacel™ Version/Model: [id number]    Company name: [employer] MRI safety info as of 3/5/25: Labeling does not contain MRI Safety Information    Contains dry or latex rubber: Yes      GMDN P.T. name: Temporary cardiac pacing balloon catheter                 As of 3/5/2025       Status: Used, Not Implanted                      Stent    Stent, Synergy Xaldo, Mr Slater, 3.50 X 48mm - [id number] - Implanted        Inventory item: STENT, SYNERGY XD, MR SLATER, 3.50 X 48MM Model/Cat number: [id number]    : [employer] Lot number: [id number]    Device identifier: [id number]        GUDID Information       Request status Successful        Brand name: SYNERGY™ XD Version/Model: [id number]    Company name: [employer] MRI safety info as of 3/5/25: MR Fuentes    Contains dry or latex rubber: No      GMDN P.T. name: Drug-eluting coronary artery stent, non-bioabsorbable-polymer-coated                As of 3/5/2025       Status: Implanted                              Anticoagulation/Antiplatelet Plan:   DAPT   Loaded with aspirin and clopidogrel before the procedure   To continue on DAPT until clinic review - likely for at least 6 months     Estimated Blood Loss:   15 mL    Anesthesia: Moderate Sedation Anesthesia Staff: No anesthesia staff entered.    Any Specimen(s) Removed:   No specimens collected during this procedure.    Disposition:   [facility]  Dc home tomorrow      Electronically signed by: Moises Aranda MD, 3/5/2025 1:50 PM

## 2025-03-05 NOTE — PROGRESS NOTES
Pharmacy Medication History Review    Jeanine Ghosh is a 64 y.o. female admitted for No Principal Problem: There is no principal problem currently on the Problem List. Please update the Problem List and refresh.. Pharmacy reviewed the patient's [id number]-[id number] medications and allergies for accuracy.    Medications ADDED:  None  Medications CHANGED:  None  Medications REMOVED:   None     The list below reflects the updated PTA list.   Prior to Admission Medications   Prescriptions Informant   DULoxetine (Cymbalta) 20 mg DR capsule Self   Sig: Take 1 capsule (20 mg) by mouth 2 times a day. Do not crush or chew.   Semglee,insulin glarg-yfgn,Pen 100 unit/mL (3 mL) Pen Self   Sig: Inject 12 Units under the skin once daily in the morning.   aspirin 325 mg tablet Self   Sig: Take 1 tablet (325 mg) by mouth once daily.   atenolol (Tenormin) 50 mg tablet Self   Sig: Take 1 tablet (50 mg) by mouth once daily. as directed   atorvastatin (Lipitor) 40 mg tablet Self   Sig: Take 2 tablets (80 mg) by mouth once daily.   ezetimibe (Zetia) 10 mg tablet Self   Sig: Take 1 tablet (10 mg) by mouth once daily.   flash glucose sensor kit (FreeStyle Mando 2 Sensor) kit Self   Sig: For continuous glucose monitoring.  Cool every 14 days   insulin aspart (NovoLOG) 100 unit/mL (3 mL) pen Self   Sig: Inject 1 unit for every 10 grams of carb.  Max of 60 units per day   isosorbide mononitrate ER (Imdur) 30 mg 24 hr tablet Self   Sig: Take 1 tablet (30 mg) by mouth once daily. Do not crush or chew.   levothyroxine (Synthroid, Levoxyl) 50 mcg tablet Self   Sig: Take 1 tablet (50 mcg) by mouth once daily. Take on an empty stomach.   lisinopril 40 mg tablet Self   Sig: TAKE ONE TABLET BY MOUTH EVERY DAY      Facility-Administered Medications: None        The list below reflects the updated allergy list. Please review each documented allergy for additional clarification and justification.  Allergies  Reviewed by Xander Mckeon, PharmD on  "3/5/2025        Severity Reactions Comments    Animal Dander High Shortness of breath especially cats    Morphine Medium Itching, Swelling             Patient declines M2B at discharge.     Sources:   OAS  Pharmacy dispense history  Patient interview Moderate historian  Chart Review  Care Everywhere    Additional Comments:  No recent dispense history per OAS report  Patient was able to verify PTA med list with prompting      Xander Mckeon, PharmD  Transitions of Care Pharmacist  03/05/25     Secure Chat preferred   If no response call [phone] or Vocera \"Med Rec\"    "

## 2025-03-06 ENCOUNTER — PHARMACY VISIT (OUTPATIENT)
Dept: PHARMACY | Facility: CLINIC | Age: 65
End: 2025-03-06
Payer: COMMERCIAL

## 2025-03-06 ENCOUNTER — APPOINTMENT (OUTPATIENT)
Dept: VASCULAR MEDICINE | Facility: HOSPITAL | Age: 65
End: 2025-03-06
Payer: COMMERCIAL

## 2025-03-06 VITALS
RESPIRATION RATE: 18 BRPM | TEMPERATURE: 97.9 F | SYSTOLIC BLOOD PRESSURE: 111 MMHG | OXYGEN SATURATION: 94 % | HEART RATE: 71 BPM | WEIGHT: 153.88 LBS | BODY MASS INDEX: 28.15 KG/M2 | DIASTOLIC BLOOD PRESSURE: 71 MMHG

## 2025-03-06 LAB
ALBUMIN SERPL BCP-MCNC: 3.2 G/DL (ref 3.4–5)
ANION GAP SERPL CALC-SCNC: 13 MMOL/L (ref 10–20)
BUN SERPL-MCNC: 32 MG/DL (ref 6–23)
CALCIUM SERPL-MCNC: 8.7 MG/DL (ref 8.6–10.6)
CHLORIDE SERPL-SCNC: 107 MMOL/L (ref 98–107)
CO2 SERPL-SCNC: 22 MMOL/L (ref 21–32)
CREAT SERPL-MCNC: 1.35 MG/DL (ref 0.5–1.05)
EGFRCR SERPLBLD CKD-EPI 2021: 44 ML/MIN/1.73M*2
ERYTHROCYTE [DISTWIDTH] IN BLOOD BY AUTOMATED COUNT: 12.9 % (ref 11.5–14.5)
GLUCOSE BLD MANUAL STRIP-MCNC: 158 MG/DL (ref 74–99)
GLUCOSE SERPL-MCNC: 167 MG/DL (ref 74–99)
HCT VFR BLD AUTO: 34.5 % (ref 36–46)
HGB BLD-MCNC: 11.4 G/DL (ref 12–16)
MCH RBC QN AUTO: 31.7 PG (ref 26–34)
MCHC RBC AUTO-ENTMCNC: 33 G/DL (ref 32–36)
MCV RBC AUTO: 96 FL (ref 80–100)
NRBC BLD-RTO: 0 /100 WBCS (ref 0–0)
PHOSPHATE SERPL-MCNC: 3.4 MG/DL (ref 2.5–4.9)
PLATELET # BLD AUTO: 301 X10*3/UL (ref 150–450)
POTASSIUM SERPL-SCNC: 4.3 MMOL/L (ref 3.5–5.3)
RBC # BLD AUTO: 3.6 X10*6/UL (ref 4–5.2)
SODIUM SERPL-SCNC: 138 MMOL/L (ref 136–145)
WBC # BLD AUTO: 7.2 X10*3/UL (ref 4.4–11.3)

## 2025-03-06 PROCEDURE — 93931 UPPER EXTREMITY STUDY: CPT

## 2025-03-06 PROCEDURE — 2500000001 HC RX 250 WO HCPCS SELF ADMINISTERED DRUGS (ALT 637 FOR MEDICARE OP)

## 2025-03-06 PROCEDURE — 2500000001 HC RX 250 WO HCPCS SELF ADMINISTERED DRUGS (ALT 637 FOR MEDICARE OP): Performed by: NURSE PRACTITIONER

## 2025-03-06 PROCEDURE — 2500000002 HC RX 250 W HCPCS SELF ADMINISTERED DRUGS (ALT 637 FOR MEDICARE OP, ALT 636 FOR OP/ED)

## 2025-03-06 PROCEDURE — 7100000011 HC EXTENDED STAY RECOVERY HOURLY - NURSING UNIT

## 2025-03-06 PROCEDURE — 82947 ASSAY GLUCOSE BLOOD QUANT: CPT

## 2025-03-06 PROCEDURE — 93931 UPPER EXTREMITY STUDY: CPT | Performed by: INTERNAL MEDICINE

## 2025-03-06 PROCEDURE — G0378 HOSPITAL OBSERVATION PER HR: HCPCS

## 2025-03-06 PROCEDURE — RXMED WILLOW AMBULATORY MEDICATION CHARGE

## 2025-03-06 PROCEDURE — 36415 COLL VENOUS BLD VENIPUNCTURE: CPT

## 2025-03-06 PROCEDURE — 80069 RENAL FUNCTION PANEL: CPT

## 2025-03-06 PROCEDURE — 2500000002 HC RX 250 W HCPCS SELF ADMINISTERED DRUGS (ALT 637 FOR MEDICARE OP, ALT 636 FOR OP/ED): Performed by: STUDENT IN AN ORGANIZED HEALTH CARE EDUCATION/TRAINING PROGRAM

## 2025-03-06 PROCEDURE — 85027 COMPLETE CBC AUTOMATED: CPT

## 2025-03-06 PROCEDURE — 99239 HOSP IP/OBS DSCHRG MGMT >30: CPT | Performed by: NURSE PRACTITIONER

## 2025-03-06 RX ORDER — ACETAMINOPHEN 325 MG/1
650 TABLET ORAL EVERY 6 HOURS PRN
Qty: 30 TABLET | Refills: 0 | Status: SHIPPED | OUTPATIENT
Start: 2025-03-06

## 2025-03-06 RX ORDER — ASPIRIN 81 MG/1
81 TABLET ORAL DAILY
Start: 2025-03-07 | End: 2026-03-07

## 2025-03-06 RX ORDER — CLOPIDOGREL BISULFATE 75 MG/1
75 TABLET ORAL DAILY
Qty: 30 TABLET | Refills: 11 | Status: SHIPPED | OUTPATIENT
Start: 2025-03-06 | End: 2026-03-06

## 2025-03-06 RX ORDER — CLOPIDOGREL BISULFATE 75 MG/1
75 TABLET ORAL DAILY
Status: DISCONTINUED | OUTPATIENT
Start: 2025-03-06 | End: 2025-03-06 | Stop reason: HOSPADM

## 2025-03-06 RX ADMIN — LEVOTHYROXINE SODIUM 50 MCG: 50 TABLET ORAL at 05:47

## 2025-03-06 RX ADMIN — CLOPIDOGREL BISULFATE 75 MG: 75 TABLET ORAL at 09:44

## 2025-03-06 RX ADMIN — ASPIRIN 81 MG: 81 TABLET, COATED ORAL at 09:44

## 2025-03-06 ASSESSMENT — COGNITIVE AND FUNCTIONAL STATUS - GENERAL
DAILY ACTIVITIY SCORE: 24
MOBILITY SCORE: 24

## 2025-03-06 ASSESSMENT — PAIN SCALES - GENERAL: PAINLEVEL_OUTOF10: 0 - NO PAIN

## 2025-03-06 ASSESSMENT — PAIN - FUNCTIONAL ASSESSMENT: PAIN_FUNCTIONAL_ASSESSMENT: 0-10

## 2025-03-06 NOTE — CARE PLAN
The patient's goals for the shift include Have no pain    The clinical goals for the shift include patient will have no bleeding from incision site    Over the shift, the patient had no bleeding from radial site or RIJ site. Patient was instructed to use call light for any assistance needed by the nurse. Patient was HDS and able to get adequate rest throughout the night.      Problem: Safety - Adult  Goal: Free from fall injury  Outcome: Progressing     Problem: Discharge Planning  Goal: Discharge to home or other facility with appropriate resources  Outcome: Progressing

## 2025-03-06 NOTE — DISCHARGE INSTRUCTIONS
DISCHARGE INSTRUCTIONS FOR RADIAL (WRIST) ACCESS AFTER HEART CATHETERIZATION    Next 24 hrs.  -DO NOT drive a car, operate machinery or power tools.  It is recommended that a responsible adult be with you for the first 24 hours.   -DO NOT drink any alcoholic drinks or take any non-prescriptive medications that contain alcohol for the first 24 hours.   -DO NOT make any important decisions for the first 24 hours.  -You are advised to go directly home from the hospital    Wound Care:  -You may experience some tenderness, bruising or minimal inflammation.  If you have any concerns, you may contact the Cath Lab or if any of these symptoms become excessive, contact your cardiologist or go to the emergency room.   -No tub baths, soaking, or swimming for one week.   -May shower the next day after your procedure.    Radial site Care:   -DO NOT lift anything with your affected arm for the next 24 hours, then no lifting greater than 5 pounds with your affected arm for 5 more days. No bending or flexing the affected wrist.   -Avoid immersion in water of the affected site for the next 3 days.   -Apply manual pressure and call physician immediately if bleeding or hematoma occurs at the site.   -Remove dressing the next day and keep site clean, dry, and covered with a new band aid daily until healed.   -Report any symptoms other than slight tenderness at the site or tingling of the fingers and hand for up to 3 days.    Diet:   -Heart healthy diet; Low cholesterol, fat and sodium    Other Instructions:  -If you develop difficulty breathing, rash, hives, fever, chills, severe nausea, vomiting, light-headedness or any signs of infection, immediately contact your doctor and go to the nearest emergency room.        You will be on plavix 75mg daily and aspirin 81 mg daily  for at year or as discussed with Dr Brewer >Do not stop them for any reason unless discussed with your cardiologist   You are being asked to take your atenolol today  as normal and you can resume your lisinopril and imdur tomorrow   You have had an appointment made with Dr Brewer for follow up.  Please reach out to his office if you have any questions or concerns

## 2025-03-06 NOTE — DISCHARGE SUMMARY
Discharge Diagnosis  Status post insertion of drug-eluting stent into left anterior descending (LAD) artery    Issues Requiring Follow-Up  Follow up with cardiology     Hospital Course     Jeanine Ghosh is a 64 y.o. female patient who presents today for 6-week followup of LE edema and fatigue s/p stress test. Her PMH is significant for CAD s/p CABG x2 in 1997, hyperlipidemia, Hashimoto's thyroiditis, DM type 1, CKD stage 3, anxiety, fibromyalgia, and reactive airways disease. Stress testing performed 07/24/2024 was negative for ischemia. Echocardiogram performed today, 08/06/2024, demonstrated normal LV systolic function and trace to mild aortic regurgitation. Today, the patient states that she is feeling well with no new cardiac complaints.    1-10-25: This is a 64-year-old female patient known to Dr. Brewer.  Presents with her  today for follow-up of ongoing symptoms of exertional chest pain.  She had called the office and was referred to the emergency department but did not want to go so she presents today for follow-up.  Most of her episodes are tightness in her upper chest toward the left shoulder similar to what she had had prior to having bypass surgery and had disclosed this to Dr. Brewer even back at a visit earlier this year.  Stress testing was done and during maximal infusion of regadenoson she did have some EKG changes and the same type of chest pain however her perfusion imaging did not show any obvious ischemia.  Her echocardiogram has shown normal LV systolic function.  I discussed with her the possibility of proceeding with a heart catheterization but she does have chronic kidney disease and this was concerning to her so at this time she is agreeable to increase medical therapy with doubling of her atenolol dosing and we will add isosorbide.  Patient does understand that if she has intractable discomfort not related to any particular activity to please call 911 or go to the emergency  department and she verbalizes understanding.  Patient had ongoing chest discomfort and was scheduled for LHC.        Elective plan for PCI after LHC on 2/14/2025     3/5/2025:  ight ulnar artery, USS guided   Rotablation and IVUS guided PCI of the RCA   Pre emptive temporary wire through the right internal jugular vein    Overnight right arm swelling and bruised with swollen fingers and numbness, overnight interventional saw, TR band readjusted as well.  The swelling in the am  better  and numbness gone.  Radial pulse palpable.   Labs noted and creatine stable.  After discussion ultrasound done before discharge and no psuedo.    Plavix meds to beds delivered and HTN meds to resume as instructed and follow up with Dr Brewer's office in [facility].   Discharged as planned        Pertinent Physical Exam At Time of Discharge  Physical Exam  Constitutional:       Appearance: Normal appearance.   HENT:      Mouth/Throat:      Mouth: Mucous membranes are moist.   Cardiovascular:      Rate and Rhythm: Regular rhythm.   Pulmonary:      Effort: Pulmonary effort is normal.      Breath sounds: Normal breath sounds.   Musculoskeletal:         General: Swelling and tenderness present.      Comments: Right lower arm and fingers, swollen and bruised palpable radial denies numbness today and able to grasp    Skin:     General: Skin is warm and dry.      Findings: Bruising present.   Neurological:      General: No focal deficit present.      Mental Status: She is alert and oriented to person, place, and time.         Home Medications     Medication List      START taking these medications     clopidogrel 75 mg tablet; Commonly known as: Plavix; Take 1 tablet (75   mg) by mouth once daily.     CONTINUE taking these medications     FreeStyle Mando 2 Sensor kit; Generic drug: flash glucose sensor kit;   For continuous glucose monitoring.  Cool every 14 days     ASK your doctor about these medications     aspirin 325 mg tablet    atenolol 50 mg tablet; Commonly known as: Tenormin; Take 1 tablet (50   mg) by mouth once daily. as directed   atorvastatin 40 mg tablet; Commonly known as: Lipitor; Take 2 tablets   (80 mg) by mouth once daily.   DULoxetine 20 mg DR capsule; Commonly known as: Cymbalta; Take 1 capsule   (20 mg) by mouth 2 times a day. Do not crush or chew.   ezetimibe 10 mg tablet; Commonly known as: Zetia; Take 1 tablet (10 mg)   by mouth once daily.   insulin aspart 100 unit/mL (3 mL) pen; Commonly known as: NovoLOG;   Inject 1 unit for every 10 grams of carb.  Max of 60 units per day   isosorbide mononitrate ER 30 mg 24 hr tablet; Commonly known as: Imdur;   Take 1 tablet (30 mg) by mouth once daily. Do not crush or chew.   levothyroxine 50 mcg tablet; Commonly known as: Synthroid, Levoxyl; Take   1 tablet (50 mcg) by mouth once daily. Take on an empty stomach.   lisinopril 40 mg tablet; TAKE ONE TABLET BY MOUTH EVERY DAY   Semglee(insulin glarg-yfgn)Pen 100 unit/mL (3 mL) pen; Generic drug:   insulin glargine-yfgn; Inject 12 Units under the skin once daily in the   morning.; Ask about: Which instructions should I use?       Outpatient Follow-Up  Future Appointments   Date Time Provider Department Center   6/27/2025  9:45 AM Alfredito Bagley MD [id number] [facility]   8/7/2025 10:15 AM Surya Brewer MD [id number] [facility]   2/12/2026  8:00 AM Judy Diggs MD [id number] [facility]       Yana Gonzalez, APRN-CNP  Seen and discussed with interventional team Dr Alvarez saw and evaluated

## 2025-03-06 NOTE — NURSING NOTE
Pt discharged home after ultrasound of right arm.  Telemetry discontinued and heplock out.  Pt ambulated to main entrance where her  is picking her up.

## 2025-03-10 DIAGNOSIS — I25.10 ARTERIOSCLEROTIC CORONARY ARTERY DISEASE: ICD-10-CM

## 2025-03-10 DIAGNOSIS — I10 BENIGN ESSENTIAL HYPERTENSION: ICD-10-CM

## 2025-03-10 RX ORDER — ISOSORBIDE MONONITRATE 30 MG/1
30 TABLET, EXTENDED RELEASE ORAL DAILY
Qty: 30 TABLET | Refills: 2 | Status: SHIPPED | OUTPATIENT
Start: 2025-03-10 | End: 2025-03-11 | Stop reason: SDUPTHER

## 2025-03-10 NOTE — TELEPHONE ENCOUNTER
----- Message from Nurse Sujata DEGROOT sent at 3/10/2025  4:03 PM EDT -----  Regarding: Refill  Patient is requesting a refill of isosorbide 30 mg to be sent to [facility] in [place]. She is requesting 90 days at a time if possible

## 2025-03-11 ENCOUNTER — OFFICE VISIT (OUTPATIENT)
Dept: CARDIOLOGY | Facility: HOSPITAL | Age: 65
End: 2025-03-11
Payer: COMMERCIAL

## 2025-03-11 VITALS
HEART RATE: 71 BPM | WEIGHT: 152 LBS | OXYGEN SATURATION: 98 % | DIASTOLIC BLOOD PRESSURE: 56 MMHG | SYSTOLIC BLOOD PRESSURE: 120 MMHG | HEIGHT: 62 IN | BODY MASS INDEX: 27.97 KG/M2

## 2025-03-11 DIAGNOSIS — Z95.5 STATUS POST INSERTION OF DRUG-ELUTING STENT INTO LEFT ANTERIOR DESCENDING (LAD) ARTERY: ICD-10-CM

## 2025-03-11 DIAGNOSIS — I25.10 ARTERIOSCLEROTIC CORONARY ARTERY DISEASE: Primary | ICD-10-CM

## 2025-03-11 DIAGNOSIS — Z95.1 S/P CABG X 2: ICD-10-CM

## 2025-03-11 DIAGNOSIS — I10 BENIGN ESSENTIAL HYPERTENSION: ICD-10-CM

## 2025-03-11 PROCEDURE — 4010F ACE/ARB THERAPY RXD/TAKEN: CPT | Performed by: PHYSICIAN ASSISTANT

## 2025-03-11 PROCEDURE — 99214 OFFICE O/P EST MOD 30 MIN: CPT | Performed by: PHYSICIAN ASSISTANT

## 2025-03-11 PROCEDURE — 3008F BODY MASS INDEX DOCD: CPT | Performed by: PHYSICIAN ASSISTANT

## 2025-03-11 PROCEDURE — 1036F TOBACCO NON-USER: CPT | Performed by: PHYSICIAN ASSISTANT

## 2025-03-11 PROCEDURE — 3078F DIAST BP <80 MM HG: CPT | Performed by: PHYSICIAN ASSISTANT

## 2025-03-11 PROCEDURE — 3074F SYST BP LT 130 MM HG: CPT | Performed by: PHYSICIAN ASSISTANT

## 2025-03-11 RX ORDER — ISOSORBIDE MONONITRATE 30 MG/1
30 TABLET, EXTENDED RELEASE ORAL DAILY
Qty: 90 TABLET | Refills: 3 | Status: SHIPPED | OUTPATIENT
Start: 2025-03-11 | End: 2026-03-11

## 2025-03-11 RX ORDER — CLOPIDOGREL BISULFATE 75 MG/1
75 TABLET ORAL DAILY
Qty: 90 TABLET | Refills: 3 | Status: SHIPPED | OUTPATIENT
Start: 2025-03-11 | End: 2026-03-11

## 2025-03-11 ASSESSMENT — ENCOUNTER SYMPTOMS
WEAKNESS: 0
ORTHOPNEA: 0
PALPITATIONS: 0
DYSURIA: 0
VOMITING: 0
ABDOMINAL PAIN: 0
NAUSEA: 0
WHEEZING: 0
DIARRHEA: 0
SHORTNESS OF BREATH: 0
FEVER: 0

## 2025-03-11 NOTE — PROGRESS NOTES
"Cardiology Follow Up  Chief Complaint:   Follow up of recent PCI.     History Of Present Illness:    Jeanine Ghosh is a 64 y.o. female patient who presents today in the company of her daughter for 1-month followup of chest pain s/p LHC. Her PMH is significant for CAD s/p CABG x2 in 1997, hyperlipidemia, Hashimoto's thyroiditis, DM type 1, CKD stage 3, anxiety, fibromyalgia, and reactive airways disease. LHC performed 02/14/2025 revealed revealing single vessel CAD with severe sequential calcific stenosis of mid to distal RCA. The plan is for staged PCI using atherectomy of the RCA in the following weeks. Today, the patient states that she is feeling relatively well, having recovered from her heart cath.   3-11-25: This is a very pleasant 64-year-old female patient known to Dr. Brewer.  Patient with recent heart catheterization with severe calcified RCA plaque.  Subsequently at [facility] had atherectomy and PCI with stenting to her RCA.  Right radial access patient had extensive bruising ultrasound showed no evidence of vascular injury or pseudoaneurysm.  The bruising is resolving but she still having some intermittent pains in the right forearm.  Her capillary refill pulses and everything are normal is just certain ways that she moves it.  She has been using some warm compresses.  She has not missed any doses of her DAPT and is back on her statin as her lipids were markedly abnormal.  Will repeat lipid profile in 6 weeks.  She is a non-smoker.  Denies chest pain.  Last Recorded Vitals:  Vitals:    03/11/25 1521   BP: 120/56   BP Location: Left arm   Patient Position: Sitting   BP Cuff Size: Adult   Pulse: 71   SpO2: 98%   Weight: 68.9 kg (152 lb)   Height: 1.575 m (5' 2\")       Past Medical History:  She has a past medical history of Acute URI (03/27/2023), Acute URI (05/23/2024), Anxiety disorder, unspecified (11/24/2013), Arteriosclerosis of coronary artery (05/23/2024), Atherosclerosis of coronary artery " (07/20/2009), Bronchitis (05/23/2024), Bronchitis (05/23/2024), Chronic lymphocytic thyroiditis (05/23/2024), COVID-19 virus infection (03/27/2023), COVID-19 virus infection (05/23/2024), Encounter for screening for malignant neoplasm of colon (06/03/2016), Exposure to severe acute respiratory syndrome coronavirus 2 (SARS-CoV-2) (05/23/2024), Hearing loss of left ear (05/23/2024), Herpesviral infection, unspecified (12/07/2021), Left upper quadrant pain (02/06/2017), Low back pain, unspecified (11/24/2013), Mixed hyperlipidemia (05/23/2024), Other age-related incipient cataract, unspecified eye, Other enthesopathies, not elsewhere classified (11/24/2013), Pain in unspecified joint (12/09/2021), Pain in unspecified joint (01/06/2017), Polyp of colon (11/24/2013), Primary osteoarthritis, unspecified hand (11/24/2013), Proteinuria, unspecified (02/21/2014), Pure hypercholesterolemia (05/23/2024), Radiculopathy, cervical region (07/30/2015), Reactive airway disease ([facility]) (05/23/2024), Spinal stenosis, cervical region (07/30/2015), Type 1 diabetes mellitus without complications (08/22/2022), Viral illness (03/27/2023), and Viral illness (05/23/2024).    Past Surgical History:  She has a past surgical history that includes Other surgical history (06/15/2022); Other surgical history (08/26/2019); Other surgical history (08/26/2019); Other surgical history (08/26/2019); Other surgical history (08/26/2019); Coronary artery bypass graft (03/13/2018); Other surgical history (07/07/2022); Cardiac catheterization (N/A, 2/14/2025); Cardiac catheterization (N/A, 3/5/2025); Cardiac catheterization (N/A, 3/5/2025); Cardiac catheterization (N/A, 3/5/2025); and Cardiac catheterization (N/A, 3/5/2025).      Social History:  She reports that she has never smoked. She has never been exposed to tobacco smoke. She has never used smokeless tobacco. She reports that she does not currently use alcohol. She reports that she does not  currently use drugs.    Family History:  Family History   Problem Relation Name Age of Onset    COPD Brother          Allergies:  Animal dander and Morphine    Outpatient Medications:  Current Outpatient Medications   Medication Instructions    acetaminophen (TYLENOL) 650 mg, oral, Every 6 hours PRN    aspirin 81 mg, oral, Daily    atenolol (TENORMIN) 50 mg, oral, Daily, as directed    atorvastatin (LIPITOR) 80 mg, oral, Daily    clopidogrel (PLAVIX) 75 mg, oral, Daily    DULoxetine (CYMBALTA) 20 mg, oral, 2 times daily, Do not crush or chew.    ezetimibe (ZETIA) 10 mg, oral, Daily    flash glucose sensor kit (FreeStyle Mando 2 Sensor) kit For continuous glucose monitoring.  Cool every 14 days    insulin aspart (NovoLOG) 100 unit/mL (3 mL) pen Inject 1 unit for every 10 grams of carb.  Max of 60 units per day    isosorbide mononitrate ER (IMDUR) 30 mg, oral, Daily, Do not crush or chew.    levothyroxine (SYNTHROID, LEVOXYL) 50 mcg, oral, Daily, Take on an empty stomach.    lisinopril 40 mg, oral, Daily    Semglee(insulin glarg-yfgn)Pen 12 Units, subcutaneous, Every morning     Review of Systems   Constitutional: Negative for fever and malaise/fatigue.   Cardiovascular:  Negative for chest pain, orthopnea and palpitations.   Respiratory:  Negative for shortness of breath and wheezing.    Skin:  Negative for itching and rash.   Musculoskeletal:         Some pains off and on R forearm due to bruising   Gastrointestinal:  Negative for abdominal pain, diarrhea, nausea and vomiting.   Genitourinary:  Negative for dysuria.   Neurological:  Negative for weakness.      Physical Exam  Constitutional:       General: She is not in acute distress.     Appearance: Normal appearance.   HENT:      Mouth/Throat:      Mouth: Mucous membranes are moist.   Neck:      Comments: No JVD  Cardiovascular:      Rate and Rhythm: Normal rate and regular rhythm.      Heart sounds: Normal heart sounds.   Pulmonary:      Effort: Pulmonary effort  is normal.      Breath sounds: Normal breath sounds.   Abdominal:      General: Bowel sounds are normal.      Palpations: Abdomen is soft.      Tenderness: There is no abdominal tenderness.   Musculoskeletal:      Right lower leg: No edema.      Left lower leg: No edema.   Skin:     General: Skin is warm and dry.      Findings: Bruising present.      Comments: Significant bruising R forearm--good pulses and US was negative for pseudoaneurysm   Neurological:      Mental Status: She is alert and oriented to person, place, and time.   Psychiatric:         Behavior: Behavior is cooperative.           Last Labs:  CBC -  Lab Results   Component Value Date    WBC 7.2 03/06/2025    WBC 4.6 02/10/2025    HGB 11.4 (L) 03/06/2025    HGB 13.2 02/10/2025    HCT 34.5 (L) 03/06/2025    HCT 40.5 02/10/2025    MCV 96 03/06/2025    MCV 95.7 02/10/2025     03/06/2025     02/10/2025       CMP -  Lab Results   Component Value Date    CALCIUM 8.7 03/06/2025    CALCIUM 9.0 02/10/2025    PHOS 3.4 03/06/2025    PROT 6.1 02/10/2025    ALBUMIN 3.2 (L) 03/06/2025    ALBUMIN 3.7 02/10/2025    AST 16 02/10/2025    ALT 17 02/10/2025    ALKPHOS 60 02/10/2025    BILITOT 0.5 02/10/2025       LIPID PANEL -   Lab Results   Component Value Date    CHOL 213 (H) 02/10/2025    TRIG 90 02/10/2025    HDL 67 02/10/2025    CHHDL 3.2 02/10/2025    LDLF 102 (H) 07/08/2022    VLDL 16 02/05/2024    NHDL 146 (H) 02/10/2025       RENAL FUNCTION PANEL -   Lab Results   Component Value Date    GLUCOSE 167 (H) 03/06/2025    GLUCOSE 131 (H) 02/10/2025     03/06/2025     02/10/2025    K 4.3 03/06/2025    K 5.0 02/10/2025     03/06/2025     02/10/2025    CO2 22 03/06/2025    CO2 24 02/10/2025    ANIONGAP 13 03/06/2025    ANIONGAP 8 02/10/2025    BUN 32 (H) 03/06/2025    BUN 40 (H) 02/10/2025    CREATININE 1.35 (H) 03/06/2025    CREATININE 1.38 (H) 02/10/2025    CALCIUM 8.7 03/06/2025    CALCIUM 9.0 02/10/2025    PHOS 3.4 03/06/2025     ALBUMIN 3.2 (L) 03/06/2025    ALBUMIN 3.7 02/10/2025        Lab Results   Component Value Date    HGBA1C 9.1 (H) 02/10/2025    HGBA1C 8.6 (A) 06/14/2024       Last Cardiology Tests:    Echo:  Transthoracic Echo (TTE) Complete 08/06/2024--CONCLUSIONS:   1. The left ventricular systolic function is normal, with a León's biplane calculated ejection fraction of 59%.   2. There is normal right ventricular global systolic function.    Ejection Fractions:  EF   Date/Time Value Ref Range Status   08/06/2024 08:18 AM 59 %      Cath:  [facility]--CONCLUSIONS:   1. Double vessel disease.   2. Severe sequential calcific stenosis of mid to distal RCA.   3. Staged PCI using atherectomy of the RCA in the following weeks.   4. Aggressive secondary prevention.  Cardiac Catheterization Procedure 03/05/2025--CONCLUSIONS:   1. Successful atherectomy and IVUS guided PCI of the RCA with one YASMANY.   2. DAPT.   3. Follow up with Dr Brewer in the out patient.   4. Agressive secondary prevention.  Stress Test:  Nuclear Stress Test 07/24/2024--IMPRESSION:  1. Normal stress myocardial perfusion imaging in response to  pharmacologic stress. Mild attenuation of the anterolateral segments  seen on the supine images that improves with prone imaging most  likely soft tissue attenuation.  2. Well-maintained left ventricular function.          Lab review: I have personally reviewed the laboratory result(s)     Assessment/Plan   Problem List Items Addressed This Visit             ICD-10-CM       Cardiac and Vasculature    Arteriosclerotic coronary artery disease - Primary I25.10    Benign essential hypertension I10    S/P CABG x 2 Z95.1   ASHD--patient with complex calcific disease in her RCA.  At [facility] had atherectomy and PCI with stenting of her RCA.  She denies any chest pain or concerning cardiac symptoms.  She is on DAPT and was placed back on statin therapy due to markedly elevated lipids.  May be a candidate for Repatha as well.  As a  complication patient with significant bruising to her right forearm and having some dysesthesia from all of the bruising which extends all the way up to her elbow.  Vascular study was negative for any vascular injury or pseudoaneurysm and on examination her radial pulses intact without any bruits but the whole forearm area is just somewhat tender.  Will recommend ongoing warm compresses and continue medical therapy and will arrange for cardiac rehabilitation.  Hyperlipidemia--repeat lipid profile in 6 weeks time.  Hypertension--blood pressures are well-controlled on current meds which we will continue.  Overall patient is doing well except having some discomfort from his extensive bruising to her right forearm.  Again vascular study was unremarkable.  She will continue with warm compresses and will update lipids in 6 weeks time but otherwise she is already scheduled back with Dr. Brewer at the end of March and she will keep that appointment certainly if there is any change or new concerns she is instructed to contact the office.      Mckinley Cortes PA-C  3/11/2025  3:32 PM

## 2025-03-11 NOTE — PATIENT INSTRUCTIONS
Warm compresses to R forearm where you have bruising.  Repeat lipid profile in 6 weeks.    Limit arm/wrist movements to reduce pain.  Continue statin therapy.  We will refer to cardiac rehab.  Please keep your follow up with Dr. Brewer at end of March.

## 2025-03-13 ENCOUNTER — TELEPHONE (OUTPATIENT)
Dept: CARDIOLOGY | Facility: HOSPITAL | Age: 65
End: 2025-03-13
Payer: COMMERCIAL

## 2025-03-13 RX ORDER — INSULIN LISPRO 100 [IU]/ML
INJECTION, SOLUTION INTRAVENOUS; SUBCUTANEOUS
Qty: 30 ML | Refills: 1 | Status: SHIPPED | OUTPATIENT
Start: 2025-03-13 | End: 2025-03-13

## 2025-03-13 RX ORDER — INSULIN GLARGINE-YFGN 100 [IU]/ML
15 INJECTION, SOLUTION SUBCUTANEOUS EVERY 24 HOURS
Qty: 30 ML | Refills: 1 | Status: SHIPPED | OUTPATIENT
Start: 2025-03-13 | End: 2025-03-13 | Stop reason: ALTCHOICE

## 2025-03-13 RX ORDER — INSULIN LISPRO 100 [IU]/ML
INJECTION, SOLUTION INTRAVENOUS; SUBCUTANEOUS
Qty: 30 ML | Refills: 1 | Status: SHIPPED | OUTPATIENT
Start: 2025-03-13

## 2025-03-13 RX ORDER — INSULIN GLARGINE-YFGN 100 [IU]/ML
15 INJECTION, SOLUTION SUBCUTANEOUS EVERY 24 HOURS
Qty: 30 ML | Refills: 1 | Status: SHIPPED | OUTPATIENT
Start: 2025-03-13 | End: 2025-09-09

## 2025-03-13 NOTE — TELEPHONE ENCOUNTER
Patient called in to ask if we could add DR. SHAILA PATRICIO to her care team so she could ask him some questions and concerns about how her hand is doing. She states she is going to message him via my chart.    Thank you!  Miller MCGINNIS

## 2025-03-20 DIAGNOSIS — Z95.5 S/P CORONARY ARTERY STENT PLACEMENT: Primary | ICD-10-CM

## 2025-03-20 DIAGNOSIS — I25.10 ASHD (ARTERIOSCLEROTIC HEART DISEASE): ICD-10-CM

## 2025-03-31 ENCOUNTER — OFFICE VISIT (OUTPATIENT)
Dept: CARDIOLOGY | Facility: HOSPITAL | Age: 65
End: 2025-03-31
Payer: COMMERCIAL

## 2025-03-31 VITALS
SYSTOLIC BLOOD PRESSURE: 128 MMHG | BODY MASS INDEX: 27.97 KG/M2 | WEIGHT: 152 LBS | HEIGHT: 62 IN | DIASTOLIC BLOOD PRESSURE: 66 MMHG | HEART RATE: 63 BPM

## 2025-03-31 DIAGNOSIS — Z95.5 STATUS POST INSERTION OF DRUG-ELUTING STENT INTO LEFT ANTERIOR DESCENDING (LAD) ARTERY: ICD-10-CM

## 2025-03-31 DIAGNOSIS — E78.00 HIGH CHOLESTEROL: Primary | ICD-10-CM

## 2025-03-31 DIAGNOSIS — I25.10 ARTERIOSCLEROTIC CORONARY ARTERY DISEASE: ICD-10-CM

## 2025-03-31 LAB
ATRIAL RATE: 63 BPM
P AXIS: 21 DEGREES
P OFFSET: 199 MS
P ONSET: 149 MS
PR INTERVAL: 156 MS
Q ONSET: 227 MS
QRS COUNT: 10 BEATS
QRS DURATION: 54 MS
QT INTERVAL: 396 MS
QTC CALCULATION(BAZETT): 405 MS
QTC FREDERICIA: 402 MS
R AXIS: 67 DEGREES
T AXIS: 25 DEGREES
T OFFSET: 425 MS
VENTRICULAR RATE: 63 BPM

## 2025-03-31 PROCEDURE — 3074F SYST BP LT 130 MM HG: CPT | Performed by: INTERNAL MEDICINE

## 2025-03-31 PROCEDURE — 3008F BODY MASS INDEX DOCD: CPT | Performed by: INTERNAL MEDICINE

## 2025-03-31 PROCEDURE — 99213 OFFICE O/P EST LOW 20 MIN: CPT | Mod: 25 | Performed by: INTERNAL MEDICINE

## 2025-03-31 PROCEDURE — 1036F TOBACCO NON-USER: CPT | Performed by: INTERNAL MEDICINE

## 2025-03-31 PROCEDURE — 93005 ELECTROCARDIOGRAM TRACING: CPT | Performed by: INTERNAL MEDICINE

## 2025-03-31 PROCEDURE — 4010F ACE/ARB THERAPY RXD/TAKEN: CPT | Performed by: INTERNAL MEDICINE

## 2025-03-31 PROCEDURE — 3078F DIAST BP <80 MM HG: CPT | Performed by: INTERNAL MEDICINE

## 2025-03-31 PROCEDURE — 99213 OFFICE O/P EST LOW 20 MIN: CPT | Performed by: INTERNAL MEDICINE

## 2025-03-31 RX ORDER — EVOLOCUMAB 140 MG/ML
140 INJECTION, SOLUTION SUBCUTANEOUS
Qty: 6 ML | Refills: 3 | Status: SHIPPED | OUTPATIENT
Start: 2025-03-31 | End: 2026-03-31

## 2025-03-31 RX ORDER — CLOPIDOGREL BISULFATE 75 MG/1
75 TABLET ORAL DAILY
Qty: 90 TABLET | Refills: 3 | Status: SHIPPED | OUTPATIENT
Start: 2025-03-31 | End: 2026-03-31

## 2025-03-31 ASSESSMENT — ENCOUNTER SYMPTOMS
OCCASIONAL FEELINGS OF UNSTEADINESS: 0
DEPRESSION: 0
LOSS OF SENSATION IN FEET: 0

## 2025-03-31 NOTE — PATIENT INSTRUCTIONS
Continue all current medications as prescribed.  Dr. Brewer has sent a prescription for Repatha, an every 2 week injectable cholesterol lowering agent, to see if your insurance will cover it. If approved, you will start this medication and discontinue atorvastatin, and you can transfer the prescription to your preferred pharmacy.   Dr. Brewer has placed a referral for cardiac rehabilitation.   Please have repeat blood work drawn in 3 months.  Followup with TAWNYA Lacey, in 3 months.     If you have any questions or cardiac concerns, please call our office at [phone].

## 2025-03-31 NOTE — LETTER
March 31, 2025     Judy Diggs MD  [address] [facility] [address]  [place] [address]    Patient: Jeanine Ghosh   YOB: 1960   Date of Visit: 3/31/2025       Dear Dr. Judy Diggs MD:    Thank you for referring Jeanine Ghosh to me for evaluation. Below are my notes for this consultation.  If you have questions, please do not hesitate to call me. I look forward to following your patient along with you.       Sincerely,     Surya Brewer MD      CC: No Recipients  ______________________________________________________________________________________    Counseling:  The patient was counseled regarding diagnostic results, instructions for management, risk factor reductions, prognosis, patient and family education, impressions, risks and benefits of treatment options and importance of compliance with treatment.      Chief Complaint:  The patient presents today for 3-week followup of CAD and HTN.     History Of Present Illness:    Jeanine Ghosh is a 64 y.o. female patient who presents today for 3-week followup of CAD and HTN. Her PMH is significant for CAD s/p CABG x2 in 1997 and s/p atherectomy and IVUS guided PCI of RCA with one YASMANY 03/05/2025, hyperlipidemia, Hashimoto's thyroiditis, DM type 1, CKD stage 3, anxiety, fibromyalgia, and reactive airways disease. Today, the patient states that she is feeling well with no new cardiac complaints. BP has been stable. EKG today shows NSR with no acute changes. The patient is compliant with her prescribed medications, although she reports myalgias with atorvastatin.     Past Surgical History:  She has a past surgical history that includes Other surgical history (06/15/2022); Other surgical history (08/26/2019); Other surgical history (08/26/2019); Other surgical history (08/26/2019); Other surgical history (08/26/2019); Coronary artery bypass graft (03/13/2018); Other surgical history (07/07/2022); Cardiac  "catheterization (N/A, 2/14/2025); Cardiac catheterization (N/A, 3/5/2025); Cardiac catheterization (N/A, 3/5/2025); Cardiac catheterization (N/A, 3/5/2025); and Cardiac catheterization (N/A, 3/5/2025).      Social History:  She reports that she has never smoked. She has never been exposed to tobacco smoke. She has never used smokeless tobacco. She reports that she does not currently use alcohol. She reports that she does not currently use drugs.    Family History:  Family History   Problem Relation Name Age of Onset   • COPD Brother          Allergies:  Animal dander and Morphine    Outpatient Medications:  Current Outpatient Medications   Medication Instructions   • acetaminophen (TYLENOL) 650 mg, oral, Every 6 hours PRN   • aspirin 81 mg, oral, Daily   • atenolol (TENORMIN) 50 mg, oral, Daily, as directed   • atorvastatin (LIPITOR) 80 mg, oral, Daily   • clopidogrel (PLAVIX) 75 mg, oral, Daily   • DULoxetine (CYMBALTA) 20 mg, oral, 2 times daily, Do not crush or chew.   • ezetimibe (ZETIA) 10 mg, oral, Daily   • flash glucose sensor kit (FreeStyle Mando 2 Sensor) kit For continuous glucose monitoring.  Cool every 14 days   • insulin aspart (NovoLOG) 100 unit/mL (3 mL) pen Inject 1 unit for every 10 grams of carb.  Max of 60 units per day   • insulin glargine-yfgn (SEMGLEE(INSULIN GLARGINE-YFGN)) 15 Units, subcutaneous, Every 24 hours, Take as directed per insulin instructions.   • insulin lispro 100 unit/mL injection Inject 1 unit for every 10 grams of carb.  Max of 60 units per day   • isosorbide mononitrate ER (IMDUR) 30 mg, oral, Daily, Do not crush or chew.   • levothyroxine (SYNTHROID, LEVOXYL) 50 mcg, oral, Daily, Take on an empty stomach.   • lisinopril 40 mg, oral, Daily   • Semglee(insulin glarg-yfgn)Pen 12 Units, subcutaneous, Every morning        Last Recorded Vitals:  Vitals:    03/31/25 1441   BP: 128/66   BP Location: Left arm   Pulse: 63   Weight: 68.9 kg (152 lb)   Height: 1.575 m (5' 2\") "     Review of Systems   All other systems reviewed and are negative.     Physical Exam:  Constitutional:       Appearance: Healthy appearance. Not in distress.   Neck:      Vascular: No JVR. JVD normal.   Pulmonary:      Effort: Pulmonary effort is normal.      Breath sounds: Normal breath sounds. No wheezing. No rhonchi. No rales.   Chest:      Chest wall: Not tender to palpatation.   Cardiovascular:      PMI at left midclavicular line. Normal rate. Regular rhythm. Normal S1. Normal S2.       Murmurs: There is a grade 2/4 diastolic murmur at the URSB.      No gallop.  No click. No rub.   Pulses:     Intact distal pulses.   Edema:     Peripheral edema absent.   Abdominal:      General: Bowel sounds are normal.      Palpations: Abdomen is soft.      Tenderness: There is no abdominal tenderness.   Musculoskeletal: Normal range of motion.         General: No tenderness. Skin:     General: Skin is warm and dry.   Neurological:      General: No focal deficit present.      Mental Status: Alert and oriented to person, place and time.            Last Labs:  CBC -  Lab Results   Component Value Date    WBC 7.2 03/06/2025    WBC 4.6 02/10/2025    HGB 11.4 (L) 03/06/2025    HGB 13.2 02/10/2025    HCT 34.5 (L) 03/06/2025    HCT 40.5 02/10/2025    MCV 96 03/06/2025    MCV 95.7 02/10/2025     03/06/2025     02/10/2025       CMP -  Lab Results   Component Value Date    CALCIUM 8.7 03/06/2025    CALCIUM 9.0 02/10/2025    PHOS 3.4 03/06/2025    PROT 6.1 02/10/2025    ALBUMIN 3.2 (L) 03/06/2025    ALBUMIN 3.7 02/10/2025    AST 16 02/10/2025    ALT 17 02/10/2025    ALKPHOS 60 02/10/2025    BILITOT 0.5 02/10/2025       LIPID PANEL -   Lab Results   Component Value Date    CHOL 213 (H) 02/10/2025    TRIG 90 02/10/2025    HDL 67 02/10/2025    CHHDL 3.2 02/10/2025    LDLF 102 (H) 07/08/2022    VLDL 16 02/05/2024    NHDL 146 (H) 02/10/2025       RENAL FUNCTION PANEL -   Lab Results   Component Value Date    GLUCOSE 167 (H)  03/06/2025    GLUCOSE 131 (H) 02/10/2025     03/06/2025     02/10/2025    K 4.3 03/06/2025    K 5.0 02/10/2025     03/06/2025     02/10/2025    CO2 22 03/06/2025    CO2 24 02/10/2025    ANIONGAP 13 03/06/2025    ANIONGAP 8 02/10/2025    BUN 32 (H) 03/06/2025    BUN 40 (H) 02/10/2025    CREATININE 1.35 (H) 03/06/2025    CREATININE 1.38 (H) 02/10/2025    CALCIUM 8.7 03/06/2025    CALCIUM 9.0 02/10/2025    PHOS 3.4 03/06/2025    ALBUMIN 3.2 (L) 03/06/2025    ALBUMIN 3.7 02/10/2025        Lab Results   Component Value Date    HGBA1C 9.1 (H) 02/10/2025    HGBA1C 8.6 (A) 06/14/2024       Last Cardiology Tests:  03/05/2025 - Cardiac Catheterization (Staged PCI)  Successful atherectomy and IVUS guided PCI of the RCA with one YASMANY.    02/14/2025 - Cardiac Catheterization (LH)  1. Single vessel coronary artery disease without proximal left anterior descending involvement.  2. Severe sequential calcific stenosis of mid to distal RCA.  3. Staged PCI using atherectomy of the RCA in the following weeks.  4. Aggressive secodary prevention.    08/06/2024 - TTE  1. The left ventricular systolic function is normal, with a León's biplane calculated ejection fraction of 59%.  2. There is normal right ventricular global systolic function.    07/24/2024 - Stress Test  1. Normal stress myocardial perfusion imaging in response to pharmacologic stress. Mild attenuation of the anterolateral segments seen on the supine images that improves with prone imaging most likely soft tissue attenuation. Well-maintained left ventricular function.   2. Abnormal due to chest pain and borderline EKG abnormalities.     09/13/2019 - Stress Test  Normal exercise stress myocardial perfusion imaging.    Lab review: I have personally reviewed the laboratory result(s).     Assessment/Plan  1) CAD s/p Remote CABG x2 in 1997, s/p Atherectomy and IVUS Guided PCI of RCA with One YASMANY 03/05/2025   On ASA 81 mg daily, Plavix 75 mg daily,  atenolol 25 mg daily, atorvastatin 80 mg daily, Zetia 10 mg daily, lisinopril 40 mg daily, Imdur 30 mg daily.  Stress 09/2019 negative for ischemia  2/4 diastolic murmur RUSB   Stress 07/24/2024 negative for ischemia  TTE 08/06/2024 with LVEF 59%, trace-mild aortic regurgitation   [facility] 02/14/2025 with single vessel CAD with severe sequential calcific stenosis of mid to distal RCA  S/P atherectomy and IVUS guided PCI of RCA with one YASMANY 03/05/2025  Stable and asymptomatic   BP stable  EKG stable   Cardiac rehab   Continue current medical Rx   Followup with TAWNYA Lacey, in 3 months    2) HTN  Stable  On atenolol 25 mg daily, lisinopril 40 mg daily, Imdur 30 mg daily  Continue current medical Rx   Followup with TAWNYA Lacey, in 3 months    3) Hyperlipidemia  Goal LDL <70  On atorvastatin 80 mg daily, Zetia 10 mg daily   Lipid panel 02/10/2025 with LDL of 127  Reports myalgias with atorvastatin   Rx for Repatha sent to  Pharmacy for insurance approval  Continue current medical Rx   Followup with TAWNYA Lacey, in 3 months      Scribe Attestation  By signing my name below, I, Jayleen Neil   attest that this documentation has been prepared under the direction and in the presence of Surya Brewer MD.

## 2025-03-31 NOTE — PROGRESS NOTES
Counseling:  The patient was counseled regarding diagnostic results, instructions for management, risk factor reductions, prognosis, patient and family education, impressions, risks and benefits of treatment options and importance of compliance with treatment.      Chief Complaint:  The patient presents today for 3-week followup of CAD and HTN.     History Of Present Illness:    Jeanine Ghosh is a 64 y.o. female patient who presents today for 3-week followup of CAD and HTN. Her PMH is significant for CAD s/p CABG x2 in 1997 and s/p atherectomy and IVUS guided PCI of RCA with one YASMANY 03/05/2025, hyperlipidemia, Hashimoto's thyroiditis, DM type 1, CKD stage 3, anxiety, fibromyalgia, and reactive airways disease. Today, the patient states that she is feeling well with no new cardiac complaints. BP has been stable. EKG today shows NSR with no acute changes. The patient is compliant with her prescribed medications, although she reports myalgias with atorvastatin.     Past Surgical History:  She has a past surgical history that includes Other surgical history (06/15/2022); Other surgical history (08/26/2019); Other surgical history (08/26/2019); Other surgical history (08/26/2019); Other surgical history (08/26/2019); Coronary artery bypass graft (03/13/2018); Other surgical history (07/07/2022); Cardiac catheterization (N/A, 2/14/2025); Cardiac catheterization (N/A, 3/5/2025); Cardiac catheterization (N/A, 3/5/2025); Cardiac catheterization (N/A, 3/5/2025); and Cardiac catheterization (N/A, 3/5/2025).      Social History:  She reports that she has never smoked. She has never been exposed to tobacco smoke. She has never used smokeless tobacco. She reports that she does not currently use alcohol. She reports that she does not currently use drugs.    Family History:  Family History   Problem Relation Name Age of Onset    COPD Brother          Allergies:  Animal dander and Morphine    Outpatient Medications:  Current  "Outpatient Medications   Medication Instructions    acetaminophen (TYLENOL) 650 mg, oral, Every 6 hours PRN    aspirin 81 mg, oral, Daily    atenolol (TENORMIN) 50 mg, oral, Daily, as directed    atorvastatin (LIPITOR) 80 mg, oral, Daily    clopidogrel (PLAVIX) 75 mg, oral, Daily    DULoxetine (CYMBALTA) 20 mg, oral, 2 times daily, Do not crush or chew.    ezetimibe (ZETIA) 10 mg, oral, Daily    flash glucose sensor kit (FreeStyle Mando 2 Sensor) kit For continuous glucose monitoring.  Cool every 14 days    insulin aspart (NovoLOG) 100 unit/mL (3 mL) pen Inject 1 unit for every 10 grams of carb.  Max of 60 units per day    insulin glargine-yfgn (SEMGLEE(INSULIN GLARGINE-YFGN)) 15 Units, subcutaneous, Every 24 hours, Take as directed per insulin instructions.    insulin lispro 100 unit/mL injection Inject 1 unit for every 10 grams of carb.  Max of 60 units per day    isosorbide mononitrate ER (IMDUR) 30 mg, oral, Daily, Do not crush or chew.    levothyroxine (SYNTHROID, LEVOXYL) 50 mcg, oral, Daily, Take on an empty stomach.    lisinopril 40 mg, oral, Daily    Semglee(insulin glarg-yfgn)Pen 12 Units, subcutaneous, Every morning        Last Recorded Vitals:  Vitals:    03/31/25 1441   BP: 128/66   BP Location: Left arm   Pulse: 63   Weight: 68.9 kg (152 lb)   Height: 1.575 m (5' 2\")     Review of Systems   All other systems reviewed and are negative.     Physical Exam:  Constitutional:       Appearance: Healthy appearance. Not in distress.   Neck:      Vascular: No JVR. JVD normal.   Pulmonary:      Effort: Pulmonary effort is normal.      Breath sounds: Normal breath sounds. No wheezing. No rhonchi. No rales.   Chest:      Chest wall: Not tender to palpatation.   Cardiovascular:      PMI at left midclavicular line. Normal rate. Regular rhythm. Normal S1. Normal S2.       Murmurs: There is a grade 2/4 diastolic murmur at the URSB.      No gallop.  No click. No rub.   Pulses:     Intact distal pulses.   Edema:     " Peripheral edema absent.   Abdominal:      General: Bowel sounds are normal.      Palpations: Abdomen is soft.      Tenderness: There is no abdominal tenderness.   Musculoskeletal: Normal range of motion.         General: No tenderness. Skin:     General: Skin is warm and dry.   Neurological:      General: No focal deficit present.      Mental Status: Alert and oriented to person, place and time.            Last Labs:  CBC -  Lab Results   Component Value Date    WBC 7.2 03/06/2025    WBC 4.6 02/10/2025    HGB 11.4 (L) 03/06/2025    HGB 13.2 02/10/2025    HCT 34.5 (L) 03/06/2025    HCT 40.5 02/10/2025    MCV 96 03/06/2025    MCV 95.7 02/10/2025     03/06/2025     02/10/2025       CMP -  Lab Results   Component Value Date    CALCIUM 8.7 03/06/2025    CALCIUM 9.0 02/10/2025    PHOS 3.4 03/06/2025    PROT 6.1 02/10/2025    ALBUMIN 3.2 (L) 03/06/2025    ALBUMIN 3.7 02/10/2025    AST 16 02/10/2025    ALT 17 02/10/2025    ALKPHOS 60 02/10/2025    BILITOT 0.5 02/10/2025       LIPID PANEL -   Lab Results   Component Value Date    CHOL 213 (H) 02/10/2025    TRIG 90 02/10/2025    HDL 67 02/10/2025    CHHDL 3.2 02/10/2025    LDLF 102 (H) 07/08/2022    VLDL 16 02/05/2024    NHDL 146 (H) 02/10/2025       RENAL FUNCTION PANEL -   Lab Results   Component Value Date    GLUCOSE 167 (H) 03/06/2025    GLUCOSE 131 (H) 02/10/2025     03/06/2025     02/10/2025    K 4.3 03/06/2025    K 5.0 02/10/2025     03/06/2025     02/10/2025    CO2 22 03/06/2025    CO2 24 02/10/2025    ANIONGAP 13 03/06/2025    ANIONGAP 8 02/10/2025    BUN 32 (H) 03/06/2025    BUN 40 (H) 02/10/2025    CREATININE 1.35 (H) 03/06/2025    CREATININE 1.38 (H) 02/10/2025    CALCIUM 8.7 03/06/2025    CALCIUM 9.0 02/10/2025    PHOS 3.4 03/06/2025    ALBUMIN 3.2 (L) 03/06/2025    ALBUMIN 3.7 02/10/2025        Lab Results   Component Value Date    HGBA1C 9.1 (H) 02/10/2025    HGBA1C 8.6 (A) 06/14/2024       Last Cardiology  Tests:  03/05/2025 - Cardiac Catheterization (Staged PCI)  Successful atherectomy and IVUS guided PCI of the RCA with one YASMANY.    02/14/2025 - Cardiac Catheterization (LH)  1. Single vessel coronary artery disease without proximal left anterior descending involvement.  2. Severe sequential calcific stenosis of mid to distal RCA.  3. Staged PCI using atherectomy of the RCA in the following weeks.  4. Aggressive secodary prevention.    08/06/2024 - TTE  1. The left ventricular systolic function is normal, with a León's biplane calculated ejection fraction of 59%.  2. There is normal right ventricular global systolic function.    07/24/2024 - Stress Test  1. Normal stress myocardial perfusion imaging in response to pharmacologic stress. Mild attenuation of the anterolateral segments seen on the supine images that improves with prone imaging most likely soft tissue attenuation. Well-maintained left ventricular function.   2. Abnormal due to chest pain and borderline EKG abnormalities.     09/13/2019 - Stress Test  Normal exercise stress myocardial perfusion imaging.    Lab review: I have personally reviewed the laboratory result(s).     Assessment/Plan   1) CAD s/p Remote CABG x2 in 1997, s/p Atherectomy and IVUS Guided PCI of RCA with One YASMANY 03/05/2025   On ASA 81 mg daily, Plavix 75 mg daily, atenolol 25 mg daily, atorvastatin 80 mg daily, Zetia 10 mg daily, lisinopril 40 mg daily, Imdur 30 mg daily.  Stress 09/2019 negative for ischemia  2/4 diastolic murmur RUSB   Stress 07/24/2024 negative for ischemia  TTE 08/06/2024 with LVEF 59%, trace-mild aortic regurgitation   [facility] 02/14/2025 with single vessel CAD with severe sequential calcific stenosis of mid to distal RCA  S/P atherectomy and IVUS guided PCI of RCA with one YASMANY 03/05/2025  Stable and asymptomatic   BP stable  EKG stable   Cardiac rehab   Continue current medical Rx   Followup with TAWNYA Lacey, in 3 months    2) HTN  Stable  On atenolol 25 mg daily,  lisinopril 40 mg daily, Imdur 30 mg daily  Continue current medical Rx   Followup with TAWNYA Lacey, in 3 months    3) Hyperlipidemia  Goal LDL <70  On atorvastatin 80 mg daily, Zetia 10 mg daily   Lipid panel 02/10/2025 with LDL of 127  Reports myalgias with atorvastatin   Rx for Repatha sent to  Pharmacy for insurance approval  Continue current medical Rx   Followup with TAWNYA Lacey, in 3 months      Scribe Attestation  By signing my name below, I, Jayleen Neil   attest that this documentation has been prepared under the direction and in the presence of Surya Brewer MD.

## 2025-04-02 DIAGNOSIS — M79.7 FIBROMYALGIA: ICD-10-CM

## 2025-04-02 DIAGNOSIS — E78.2 MIXED HYPERLIPIDEMIA: ICD-10-CM

## 2025-04-02 RX ORDER — DULOXETIN HYDROCHLORIDE 20 MG/1
20 CAPSULE, DELAYED RELEASE ORAL 2 TIMES DAILY
Qty: 180 CAPSULE | Refills: 0 | Status: SHIPPED | OUTPATIENT
Start: 2025-04-02 | End: 2025-07-01

## 2025-04-02 RX ORDER — EZETIMIBE 10 MG/1
10 TABLET ORAL DAILY
Qty: 90 TABLET | Refills: 0 | Status: SHIPPED | OUTPATIENT
Start: 2025-04-02 | End: 2025-07-01

## 2025-04-02 NOTE — TELEPHONE ENCOUNTER
Patient called to request medication refill.    Last appointment with our providers: 02/05/2025  Next appointment with our providers: 02/12/2026  Name of Medication:  DULoxetine (Cymbalta) 20 mg DR capsule    ezetimibe (Zetia) 10 mg tablet            Pharmacy:   [facility] - [place], [place] - [address]

## 2025-04-07 ENCOUNTER — TELEPHONE (OUTPATIENT)
Dept: CARDIOLOGY | Facility: HOSPITAL | Age: 65
End: 2025-04-07
Payer: COMMERCIAL

## 2025-04-07 NOTE — TELEPHONE ENCOUNTER
Patient called in had received a denial for the Atorvastatin medication stating it requires her to try two statins, however; patient was prescribed in the past by PCP Lovostatin and wanted to discuss possibility of getting this approved.

## 2025-04-08 ENCOUNTER — TELEPHONE (OUTPATIENT)
Dept: CARDIOLOGY | Facility: HOSPITAL | Age: 65
End: 2025-04-08
Payer: COMMERCIAL

## 2025-04-08 NOTE — TELEPHONE ENCOUNTER
4/8/25  1430  Called to do a P2P and was given the option for a reconsideration which nurse accepted.    Reconsideration to be faxed to office number and will do on 4/9/25    ----- Message from Nurse Mckinley MA sent at 4/7/2025  8:54 AM EDT -----  Regarding: p2p  Do p2p for her repatha. Phone number is [phone]

## 2025-04-15 ENCOUNTER — TELEPHONE (OUTPATIENT)
Dept: PRIMARY CARE | Facility: CLINIC | Age: 65
End: 2025-04-15
Payer: COMMERCIAL

## 2025-04-15 NOTE — TELEPHONE ENCOUNTER
She called to see if you would write a letter for her to get out of jury duty please advise and call her at [phone]

## 2025-04-22 ENCOUNTER — DOCUMENTATION (OUTPATIENT)
Dept: CARDIOLOGY | Facility: HOSPITAL | Age: 65
End: 2025-04-22
Payer: COMMERCIAL

## 2025-04-22 DIAGNOSIS — I10 BENIGN ESSENTIAL HYPERTENSION: ICD-10-CM

## 2025-04-22 DIAGNOSIS — I25.10 ARTERIOSCLEROTIC CORONARY ARTERY DISEASE: ICD-10-CM

## 2025-04-22 NOTE — PROGRESS NOTES
4/22/25  1108  Nurse never received redetermination form.    Appeal for denial of Repatha sent; awaiting outcome.

## 2025-04-30 ENCOUNTER — TELEPHONE (OUTPATIENT)
Dept: CARDIOLOGY | Facility: HOSPITAL | Age: 65
End: 2025-04-30
Payer: COMMERCIAL

## 2025-04-30 NOTE — TELEPHONE ENCOUNTER
Patient called into office to discuss Repatha denial. Patient has received from [employer] denial reasoning stating that they require past statin history which patient has been on for a while through Dr. Diggs's office.

## 2025-05-01 ENCOUNTER — HOSPITAL ENCOUNTER (OUTPATIENT)
Dept: RADIOLOGY | Facility: HOSPITAL | Age: 65
Discharge: HOME | End: 2025-05-01
Payer: COMMERCIAL

## 2025-05-01 DIAGNOSIS — N18.31 CHRONIC KIDNEY DISEASE, STAGE 3A (MULTI): ICD-10-CM

## 2025-05-01 LAB
CHOLEST SERPL-MCNC: 126 MG/DL
CHOLEST/HDLC SERPL: 2 (CALC)
HDLC SERPL-MCNC: 63 MG/DL
LDLC SERPL CALC-MCNC: 47 MG/DL (CALC)
NONHDLC SERPL-MCNC: 63 MG/DL (CALC)
TRIGL SERPL-MCNC: 75 MG/DL

## 2025-05-01 PROCEDURE — 76770 US EXAM ABDO BACK WALL COMP: CPT

## 2025-05-02 NOTE — RESULT ENCOUNTER NOTE
Lipids reviewed status post office visit and back on statin therapy showed dramatic improvement with total cholesterol 126 as it was 213 and LDL cholesterol dramatically improved from 127 down to 47.  Patient to continue current dose of statin.  Will message patient through [employer].

## 2025-05-06 DIAGNOSIS — E10.29 TYPE 1 DIABETES MELLITUS WITH MICROALBUMINURIA, WITH LONG-TERM CURRENT USE OF INSULIN (MULTI): ICD-10-CM

## 2025-05-06 DIAGNOSIS — R80.9 TYPE 1 DIABETES MELLITUS WITH MICROALBUMINURIA, WITH LONG-TERM CURRENT USE OF INSULIN (MULTI): ICD-10-CM

## 2025-05-06 RX ORDER — INSULIN GLARGINE-YFGN 100 [IU]/ML
12 INJECTION, SOLUTION SUBCUTANEOUS EVERY MORNING
Qty: 15 ML | Refills: 5 | Status: CANCELLED | OUTPATIENT
Start: 2025-05-06 | End: 2025-11-02

## 2025-05-06 NOTE — TELEPHONE ENCOUNTER
Radha would like a refill on Semglee Vials sent to the [facility] in [place].    Next appointment 6/27/25

## 2025-05-07 RX ORDER — INSULIN GLARGINE-YFGN 100 [IU]/ML
12 INJECTION, SOLUTION SUBCUTANEOUS EVERY MORNING
Qty: 10 ML | Refills: 5 | Status: SHIPPED | OUTPATIENT
Start: 2025-05-07 | End: 2025-11-03

## 2025-06-16 DIAGNOSIS — R80.9 TYPE 1 DIABETES MELLITUS WITH MICROALBUMINURIA, WITH LONG-TERM CURRENT USE OF INSULIN (MULTI): ICD-10-CM

## 2025-06-16 DIAGNOSIS — E10.29 TYPE 1 DIABETES MELLITUS WITH MICROALBUMINURIA, WITH LONG-TERM CURRENT USE OF INSULIN (MULTI): ICD-10-CM

## 2025-06-25 NOTE — TELEPHONE ENCOUNTER
Next appt 06/27/2025    Pt stated that she needs refills on her lisinopril. To be sent to the [facility] pharmacy in [place].

## 2025-06-26 RX ORDER — LISINOPRIL 40 MG/1
40 TABLET ORAL DAILY
Qty: 90 TABLET | Refills: 1 | Status: SHIPPED | OUTPATIENT
Start: 2025-06-26

## 2025-06-27 ENCOUNTER — APPOINTMENT (OUTPATIENT)
Dept: ENDOCRINOLOGY | Facility: CLINIC | Age: 65
End: 2025-06-27
Payer: COMMERCIAL

## 2025-06-27 ENCOUNTER — TELEMEDICINE (OUTPATIENT)
Dept: PHARMACY | Facility: HOSPITAL | Age: 65
End: 2025-06-27
Payer: MEDICARE

## 2025-06-27 VITALS
BODY MASS INDEX: 26.17 KG/M2 | WEIGHT: 142.2 LBS | HEART RATE: 60 BPM | DIASTOLIC BLOOD PRESSURE: 64 MMHG | HEIGHT: 62 IN | SYSTOLIC BLOOD PRESSURE: 112 MMHG

## 2025-06-27 DIAGNOSIS — E06.3 HASHIMOTO'S THYROIDITIS: ICD-10-CM

## 2025-06-27 DIAGNOSIS — R80.9 TYPE 1 DIABETES MELLITUS WITH MICROALBUMINURIA, WITH LONG-TERM CURRENT USE OF INSULIN (MULTI): Primary | ICD-10-CM

## 2025-06-27 DIAGNOSIS — R80.9 TYPE 1 DIABETES MELLITUS WITH MICROALBUMINURIA, WITH LONG-TERM CURRENT USE OF INSULIN (MULTI): ICD-10-CM

## 2025-06-27 DIAGNOSIS — E10.29 TYPE 1 DIABETES MELLITUS WITH MICROALBUMINURIA, WITH LONG-TERM CURRENT USE OF INSULIN (MULTI): ICD-10-CM

## 2025-06-27 DIAGNOSIS — E10.29 TYPE 1 DIABETES MELLITUS WITH MICROALBUMINURIA, WITH LONG-TERM CURRENT USE OF INSULIN (MULTI): Primary | ICD-10-CM

## 2025-06-27 LAB — POC HEMOGLOBIN A1C: 9.7 % (ref 4.2–6.5)

## 2025-06-27 PROCEDURE — 99214 OFFICE O/P EST MOD 30 MIN: CPT | Performed by: INTERNAL MEDICINE

## 2025-06-27 PROCEDURE — 1159F MED LIST DOCD IN RCRD: CPT | Performed by: INTERNAL MEDICINE

## 2025-06-27 PROCEDURE — 3078F DIAST BP <80 MM HG: CPT | Performed by: INTERNAL MEDICINE

## 2025-06-27 PROCEDURE — 3046F HEMOGLOBIN A1C LEVEL >9.0%: CPT | Performed by: INTERNAL MEDICINE

## 2025-06-27 PROCEDURE — 1160F RVW MEDS BY RX/DR IN RCRD: CPT | Performed by: INTERNAL MEDICINE

## 2025-06-27 PROCEDURE — 3008F BODY MASS INDEX DOCD: CPT | Performed by: INTERNAL MEDICINE

## 2025-06-27 PROCEDURE — 4010F ACE/ARB THERAPY RXD/TAKEN: CPT | Performed by: INTERNAL MEDICINE

## 2025-06-27 PROCEDURE — 83036 HEMOGLOBIN GLYCOSYLATED A1C: CPT | Performed by: INTERNAL MEDICINE

## 2025-06-27 PROCEDURE — 3074F SYST BP LT 130 MM HG: CPT | Performed by: INTERNAL MEDICINE

## 2025-06-27 PROCEDURE — 95251 CONT GLUC MNTR ANALYSIS I&R: CPT | Performed by: INTERNAL MEDICINE

## 2025-06-27 RX ORDER — BLOOD-GLUCOSE SENSOR
EACH MISCELLANEOUS
Qty: 9 EACH | Refills: 3 | Status: SHIPPED | OUTPATIENT
Start: 2025-06-27

## 2025-06-27 RX ORDER — BLOOD-GLUCOSE SENSOR
EACH MISCELLANEOUS
Qty: 2 EACH | Refills: 11 | Status: SHIPPED | OUTPATIENT
Start: 2025-06-27

## 2025-06-27 RX ORDER — INSULIN ASPART 100 [IU]/ML
INJECTION, SOLUTION INTRAVENOUS; SUBCUTANEOUS
Qty: 20 ML | Refills: 11 | Status: SHIPPED | OUTPATIENT
Start: 2025-06-27

## 2025-06-27 RX ORDER — INSULIN ASPART 100 [IU]/ML
INJECTION, SOLUTION INTRAVENOUS; SUBCUTANEOUS
Qty: 15 ML | Refills: 5 | Status: SHIPPED | OUTPATIENT
Start: 2025-06-27

## 2025-06-27 RX ORDER — INSULIN PMP CART,AUT,G6/7,CNTR
1 EACH SUBCUTANEOUS
Qty: 30 EACH | Refills: 3 | Status: SHIPPED | OUTPATIENT
Start: 2025-06-27 | End: 2026-06-27

## 2025-06-27 RX ORDER — SPIRONOLACTONE 25 MG/1
25 TABLET ORAL DAILY
COMMUNITY

## 2025-06-27 RX ORDER — INSULIN PMP CART,AUT,G6/7,CNTR
1 EACH SUBCUTANEOUS
Qty: 1 EACH | Refills: 0 | Status: SHIPPED | OUTPATIENT
Start: 2025-06-27

## 2025-06-27 RX ORDER — INSULIN GLARGINE-YFGN 100 [IU]/ML
16 INJECTION, SOLUTION SUBCUTANEOUS EVERY MORNING
Qty: 15 ML | Refills: 5 | Status: SHIPPED | OUTPATIENT
Start: 2025-06-27 | End: 2025-12-24

## 2025-06-27 ASSESSMENT — ENCOUNTER SYMPTOMS
DIAPHORESIS: 1
FATIGUE: 1

## 2025-06-27 NOTE — PROGRESS NOTES
"Subjective   Jeanine Ghosh is a 65 y.o. female who presents for a follow-up evaluation of Type 1 diabetes mellitus. The initial diagnosis of diabetes was made in 1969.     She has been having left shoulder pain and a squeezing sensation to her chest.  She underwent a cardiac cath on 2/14 and had a stent placed.     She is no won medicare.    Known complications due to diabetes included   CAD s/p 2v CABG in 1997, stent in 2025 and chronic kidney disease    Cardiovascular risk factors include diabetes mellitus and microalbuminuria. The patient is on an ACE inhibitor or angiotensin II receptor blocker.  The patient has not been previously hospitalized due to diabetic ketoacidosis.     Current symptoms/problems include none. Her clinical course has been stable.     The patient is currently checking the blood glucose multiple times per day.    Patient is using: continuous glucose monitor                                                            Hypoglycemia frequency: 0%  Hypoglycemia awareness: Yes     Current Medication Regimen  Semglee 12 units SQ daily in the morning   Insulin Aspart 1:12g       Review of Systems   Constitutional:  Positive for diaphoresis and fatigue.   All other systems reviewed and are negative.    Objective   /64   Pulse 60   Ht 1.575 m (5' 2\")   Wt 64.5 kg (142 lb 3.2 oz)   BMI 26.01 kg/m²   Physical Exam  Vitals and nursing note reviewed.   Constitutional:       General: She is not in acute distress.     Appearance: Normal appearance. She is normal weight.   HENT:      Head: Normocephalic and atraumatic.      Nose: Nose normal.      Mouth/Throat:      Mouth: Mucous membranes are moist.   Eyes:      Extraocular Movements: Extraocular movements intact.   Cardiovascular:      Rate and Rhythm: Normal rate and regular rhythm.      Comments: Healed sternotomy scar   Pulmonary:      Effort: Pulmonary effort is normal.      Breath sounds: Normal breath sounds.   Musculoskeletal:         " General: Normal range of motion.   Skin:     General: Skin is warm.   Neurological:      Mental Status: She is alert and oriented to person, place, and time.   Psychiatric:         Mood and Affect: Mood normal.         Lab Review  Lab Results   Component Value Date    HGBA1C 9.7 (A) 06/27/2025     Lab Results   Component Value Date    GLUCOSE 167 (H) 03/06/2025    CALCIUM 8.7 03/06/2025     03/06/2025    K 4.3 03/06/2025    CO2 22 03/06/2025     03/06/2025    BUN 32 (H) 03/06/2025    CREATININE 1.35 (H) 03/06/2025     Lab Results   Component Value Date    CHOL 126 05/01/2025    CHOL 213 (H) 02/10/2025    CHOL 177 02/05/2024     Lab Results   Component Value Date    HDL 63 05/01/2025    HDL 67 02/10/2025    HDL 68.4 02/05/2024     Lab Results   Component Value Date    LDLCALC 47 05/01/2025    LDLCALC 127 (H) 02/10/2025    LDLCALC 93 02/05/2024     Lab Results   Component Value Date    TRIG 75 05/01/2025    TRIG 90 02/10/2025    TRIG 78 02/05/2024     Lab Results   Component Value Date    TSH 2.75 02/10/2025    THYROIDPAB 400 (A) 07/08/2022       Health Maintenance:   Foot Exam: July 2023  Eye Exam: June 2022  Urine Albumin: February 5, 2024    CGM Interpretation  14 day CGM download was reviewed in detail as documented above and will be attached to chart.  A minimum of 72 hours of glucose data was used to inform the management plan outlined below.    Sufficient data to analyze:  Yes; CGM active 58% of the time  67% of values is above target range, which is elevated above the desired goal.    33% of values is spent in target range, and thus is below the desired goal   0% of values is spent with hypoglycemia, and thus at goal         Assessment/Plan   65-year-old female presents for follow up for type 1 diabetes mellitus.  Her blood pressure is at goal.      She was found to have Hashimoto's thyroiditis.    Type 1 diabetes mellitus with microalbuminuria, with long-term current use of insulin (Multi)  To  increase Semglee to 16 units subcutaneous daily in the morning   To continue Insulin Aspart 1 unit for every 10 grams of carb  Please continue an insulin sliding scale as follows:   150-200mg/dL - 2 units   201-250mg/dL - 4 units   251-300 mg/dL - 6 untis   301-350mg/dL - 8 units   >351mg/dL - 10 units  Record all doses of insulin in the FreeStyle Mando lori  To continue the use of your CGM for the intensive glucose monitoring due to the dynamic nature of insulin requirements, the narrow therapeutic index of insulin and the potentially fatal consequences of treatment  Counseled that the time in range should be above 70%  Counseled that the goal A1C should be 7% or less  Counseled glycemic control is warranted to prevent microvascular complications  Will check cost assistance for an Omnipod insulin pump   Dietician referral     Hashimoto's thyroiditis  To continue Levothyroxine 50mcg po daily   Take levothyroxine on an empty stomach with water alone, 30-60 minutes before eating or taking other medications, 4 hours before any calcium or iron supplement    For follow up in 4 months

## 2025-06-27 NOTE — PROGRESS NOTES
Clinical Pharmacy Team contacted Jeanine Ghosh regarding a consultation for diabetes management thanks to Dr. Bagley Below is a summary of our conversation and recommendations:    Recommendations:  Start Omnipod 5 with Novolog   Enrolling in [facility]. Orders sent. Awaiting financial documentation.  Transition to Dexcom G7 for compatibility with iPhone Omnipod lori  Follow-up in 2 weeks to provide initial Omnipod education  ________________________________________________________________________      Allergies[1]    Adverse Effects:   N/a    Objective     There were no vitals taken for this visit.    Diabetes Pharmacotherapy:    Semglee, Novolog    Lab Review  Lab Results   Component Value Date    BILITOT 0.5 02/10/2025    CALCIUM 8.7 03/06/2025    CO2 22 03/06/2025     03/06/2025    CREATININE 1.35 (H) 03/06/2025    GLUCOSE 167 (H) 03/06/2025    ALKPHOS 60 02/10/2025    K 4.3 03/06/2025    PROT 6.1 02/10/2025     03/06/2025    AST 16 02/10/2025    ALT 17 02/10/2025    BUN 32 (H) 03/06/2025    ANIONGAP 13 03/06/2025    PHOS 3.4 03/06/2025    ALBUMIN 3.2 (L) 03/06/2025    GFRF 48 (A) 02/10/2023     Lab Results   Component Value Date    TRIG 75 05/01/2025    CHOL 126 05/01/2025    LDLCALC 47 05/01/2025    HDL 63 05/01/2025     Lab Results   Component Value Date    HGBA1C 9.7 (A) 06/27/2025    HGBA1C 9.1 (H) 02/10/2025    HGBA1C 8.6 (A) 06/14/2024     The ASCVD Risk score (Kevin VOSS, et al., 2019) failed to calculate for the following reasons:    The valid total cholesterol range is 130 to 320 mg/dL    Unable to determine if patient is Non-       Monitoring                                                               Assessment/Plan     The patient reports today for a diabetes consultation. Discussed initiation of Omnipod pump to improve consistency of blood sugar levels. Patient agreeable to transitioning from MDI to automated insulin delivery. Transitioning to Dexcom G7 for [employer]  compatibility. Will enroll in  PAP to alleviate cost of therapy. Follow-up for initial education session in 2 weeks.     Patient Assistance Screening (VAF)  Patient verbally reports monthly or yearly income which is less than 400% federal poverty level  Application for program has been submitted for the following medications:   Omnipod, Novolog  Patient aware this process may take up to 2 weeks once income documents have been sent to the team.  If approved, medication must be filled through [facility] pharmacy and may be picked up or mailed to patient.   If approved, medication will be billed through insurance, and patient assistance team will pay the copay. This will result in a $0 copay for the patient.  Counseled patient on mechanism of action, side effects, contraindications, and what to do if the patient misses a dose. All patients questions were answered.          PATIENT EDUCATION/GOALS  Current A1c: [  9.7% ] [ insert date taken ]     Goals  Fasting B - 130 mg/dL  Postprandial BG: less than 180 mg/dL  A1c: less than 7%    Type of encounter: [ /virtual ]    Provided counseling on lifestyle modifications, medications, and self-monitoring. Patient has no additional questions at this time. Pharmacy to follow up in 2 weeks. Please reach out with any questions. Thank you.       Tika Wilkins, PharmD    Provider on site: Dr Bagley  Continue all meds under the continuation of care with the referring provider and clinical pharmacy team.        [1]   Allergies  Allergen Reactions    Animal Dander Shortness of breath     especially cats    Morphine Itching and Swelling

## 2025-06-27 NOTE — PATIENT INSTRUCTIONS
Thank you for choosing [facility]  for your health care needs.  If you have any questions, concerns or medical needs, please feel free to contact our office at [phone].    Please ensure you complete your blood work one week before the next scheduled appointment.    To increase Semglee to 16 units subcutaneous daily in the morning   To continue Insulin Aspart 1 unit for every 10 grams of carb  Please continue an insulin sliding scale as follows:   150-200mg/dL - 2 units   201-250mg/dL - 4 units   251-300 mg/dL - 6 untis   301-350mg/dL - 8 units   >351mg/dL - 10 units  Record all doses of insulin in the FreeStyle Mando lori  To continue the use of your CGM for the intensive glucose monitoring due to the dynamic nature of insulin requirements, the narrow therapeutic index of insulin and the potentially fatal consequences of treatment  Counseled that the time in range should be above 70%  Counseled that the goal A1C should be 7% or less  Counseled glycemic control is warranted to prevent microvascular complications  Will check cost assistance for an Omnipod insulin pump   To continue Levothyroxine 50mcg po daily   Take levothyroxine on an empty stomach with water alone, 30-60 minutes before eating or taking other medications, 4 hours before any calcium or iron supplement  Dietician referral   For follow up in 4 months

## 2025-06-29 NOTE — PROGRESS NOTES
Counseling:  The patient was counseled regarding diagnostic results, instructions for management, risk factor reductions, prognosis, patient and family education, impressions, risks and benefits of treatment options and importance of compliance with treatment.      Chief Complaint:  The patient presents today for 3-month followup of CAD, HTN and hyperlipidemia.     History Of Present Illness:    Jeanine Ghosh is a 65 y.o. female patient who presents today for 3-month followup of CAD, HTN and hyperlipidemia. Her PMH is significant for CAD s/p CABG x2 in 1997 and s/p atherectomy and IVUS guided PCI of RCA with one YASMANY 03/05/2025, hyperlipidemia, Hashimoto's thyroiditis, DM type 1, CKD stage 3, anxiety, fibromyalgia, and reactive airways disease.     Past Surgical History:  She has a past surgical history that includes Other surgical history (06/15/2022); Other surgical history (08/26/2019); Other surgical history (08/26/2019); Other surgical history (08/26/2019); Other surgical history (08/26/2019); Coronary artery bypass graft (03/13/2018); Other surgical history (07/07/2022); Cardiac catheterization (N/A, 2/14/2025); Cardiac catheterization (N/A, 3/5/2025); Cardiac catheterization (N/A, 3/5/2025); Cardiac catheterization (N/A, 3/5/2025); and Cardiac catheterization (N/A, 3/5/2025).      Social History:  She reports that she has never smoked. She has never been exposed to tobacco smoke. She has never used smokeless tobacco. She reports that she does not currently use alcohol. She reports that she does not currently use drugs.    Family History:  Family History   Problem Relation Name Age of Onset    COPD Brother          Allergies:  Animal dander and Morphine    Outpatient Medications:  Current Outpatient Medications   Medication Instructions    aspirin 81 mg, oral, Daily    atenolol (TENORMIN) 50 mg, oral, Daily, as directed    atorvastatin (LIPITOR) 80 mg, oral, Daily    blood-glucose sensor (FreeStyle Mando 2  Plus Sensor) device For continuous glucose monitoring.  Cool every 15 days    clopidogrel (PLAVIX) 75 mg, oral, Daily    Dexcom G7 Sensor device Replace every 10 days. Use with Omnipod 5    DULoxetine (CYMBALTA) 20 mg, oral, 2 times daily, Do not crush or chew.    ezetimibe (ZETIA) 10 mg, oral, Daily    flash glucose sensor kit (FreeStyle Mando 2 Sensor) kit For continuous glucose monitoring.  Cool every 14 days    insulin aspart (NovoLOG) 100 unit/mL (3 mL) pen Inject 1 unit for every 10 grams of carb.  Max of 50 units per day with sliding scale    insulin aspart (NovoLOG) 100 unit/mL injection Administer up to 200 units via Omnipod every 3 days as directed. Take as directed per insulin instructions.    insulin glargine-yfgn (SEMGLEE(INSULIN GLARG-YFGN)PEN) 16 Units, subcutaneous, Every morning, Take as directed per insulin instructions.    insulin  cart,aut,G6/7,cntr (Omnipod 5 G6-G7 Intro Kt,Gen5,) cartridge 1 each, subcutaneous, Every 3 days    insulin pump cart,auto,BT,G6/7 (Omnipod 5 G6-G7 Pods, Gen 5,) cartridge 1 each, subcutaneous, Every 3 days    isosorbide mononitrate ER (IMDUR) 30 mg, oral, Daily, Do not crush or chew.    levothyroxine (SYNTHROID, LEVOXYL) 50 mcg, oral, Daily, Take on an empty stomach.    lisinopril 40 mg, oral, Daily    Repatha SureClick 140 mg, subcutaneous, Every 14 days    spironolactone (ALDACTONE) 25 mg, Daily        Last Recorded Vitals:  There were no vitals filed for this visit.    Review of Systems   All other systems reviewed and are negative.     Physical Exam:  Constitutional:       Appearance: Healthy appearance. Not in distress.   Neck:      Vascular: No JVR. JVD normal.   Pulmonary:      Effort: Pulmonary effort is normal.      Breath sounds: Normal breath sounds. No wheezing. No rhonchi. No rales.   Chest:      Chest wall: Not tender to palpatation.   Cardiovascular:      PMI at left midclavicular line. Normal rate. Regular rhythm. Normal S1. Normal S2.        Murmurs: There is a grade 2/4 diastolic murmur at the URSB.      No gallop.  No click. No rub.   Pulses:     Intact distal pulses.   Edema:     Peripheral edema absent.   Abdominal:      General: Bowel sounds are normal.      Palpations: Abdomen is soft.      Tenderness: There is no abdominal tenderness.   Musculoskeletal: Normal range of motion.         General: No tenderness. Skin:     General: Skin is warm and dry.   Neurological:      General: No focal deficit present.      Mental Status: Alert and oriented to person, place and time.            Last Labs:  CBC -  Lab Results   Component Value Date    WBC 7.2 03/06/2025    WBC 4.6 02/10/2025    HGB 11.4 (L) 03/06/2025    HGB 13.2 02/10/2025    HCT 34.5 (L) 03/06/2025    HCT 40.5 02/10/2025    MCV 96 03/06/2025    MCV 95.7 02/10/2025     03/06/2025     02/10/2025       CMP -  Lab Results   Component Value Date    CALCIUM 8.7 03/06/2025    CALCIUM 9.0 02/10/2025    PHOS 3.4 03/06/2025    PROT 6.1 02/10/2025    ALBUMIN 3.2 (L) 03/06/2025    ALBUMIN 3.7 02/10/2025    AST 16 02/10/2025    ALT 17 02/10/2025    ALKPHOS 60 02/10/2025    BILITOT 0.5 02/10/2025       LIPID PANEL -   Lab Results   Component Value Date    CHOL 126 05/01/2025    TRIG 75 05/01/2025    HDL 63 05/01/2025    CHHDL 2.0 05/01/2025    LDLF 102 (H) 07/08/2022    VLDL 16 02/05/2024    NHDL 63 05/01/2025       RENAL FUNCTION PANEL -   Lab Results   Component Value Date    GLUCOSE 167 (H) 03/06/2025    GLUCOSE 131 (H) 02/10/2025     03/06/2025     02/10/2025    K 4.3 03/06/2025    K 5.0 02/10/2025     03/06/2025     02/10/2025    CO2 22 03/06/2025    CO2 24 02/10/2025    ANIONGAP 13 03/06/2025    ANIONGAP 8 02/10/2025    BUN 32 (H) 03/06/2025    BUN 40 (H) 02/10/2025    CREATININE 1.35 (H) 03/06/2025    CREATININE 1.38 (H) 02/10/2025    CALCIUM 8.7 03/06/2025    CALCIUM 9.0 02/10/2025    PHOS 3.4 03/06/2025    ALBUMIN 3.2 (L) 03/06/2025    ALBUMIN 3.7 02/10/2025         Lab Results   Component Value Date    HGBA1C 9.7 (A) 06/27/2025       Last Cardiology Tests:  03/05/2025 - Cardiac Catheterization (Staged PCI)  Successful atherectomy and IVUS guided PCI of the RCA with one YASMANY.    02/14/2025 - Cardiac Catheterization (LH)  1. Single vessel coronary artery disease without proximal left anterior descending involvement.  2. Severe sequential calcific stenosis of mid to distal RCA.  3. Staged PCI using atherectomy of the RCA in the following weeks.  4. Aggressive secodary prevention.    08/06/2024 - TTE  1. The left ventricular systolic function is normal, with a León's biplane calculated ejection fraction of 59%.  2. There is normal right ventricular global systolic function.    07/24/2024 - Stress Test  1. Normal stress myocardial perfusion imaging in response to pharmacologic stress. Mild attenuation of the anterolateral segments seen on the supine images that improves with prone imaging most likely soft tissue attenuation. Well-maintained left ventricular function.   2. Abnormal due to chest pain and borderline EKG abnormalities.     09/13/2019 - Stress Test  Normal exercise stress myocardial perfusion imaging.    Lab review: I have personally reviewed the laboratory result(s).     Assessment/Plan   1) CAD s/p Remote CABG x2 in 1997, s/p Atherectomy and IVUS Guided PCI of RCA with One YASMANY 03/05/2025   On ASA 81 mg daily, Plavix 75 mg daily, atenolol 25 mg daily, atorvastatin 80 mg daily, Zetia 10 mg daily, lisinopril 40 mg daily, Imdur 30 mg daily.  Stress 09/2019 negative for ischemia  2/4 diastolic murmur RUSB   Stress 07/24/2024 negative for ischemia  TTE 08/06/2024 with LVEF 59%, trace-mild aortic regurgitation   [facility] 02/14/2025 with single vessel CAD with severe sequential calcific stenosis of mid to distal RCA  S/P atherectomy and IVUS guided PCI of RCA with one YASMANY 03/05/2025    2) HTN  Stable  On atenolol 25 mg daily, lisinopril 40 mg daily, Imdur 30 mg daily    3)  Hyperlipidemia  Goal LDL <70  On atorvastatin 80 mg daily, Zetia 10 mg daily   Reports myalgias with atorvastatin   Rx for Repatha sent to  Pharmacy for insurance approval - denied by insurance   Lipid panel 05/01/2025 with LDL of 47

## 2025-06-30 ENCOUNTER — APPOINTMENT (OUTPATIENT)
Dept: CARDIOLOGY | Facility: HOSPITAL | Age: 65
End: 2025-06-30
Payer: MEDICARE

## 2025-06-30 ENCOUNTER — OFFICE VISIT (OUTPATIENT)
Dept: CARDIOLOGY | Facility: HOSPITAL | Age: 65
End: 2025-06-30
Payer: MEDICARE

## 2025-06-30 VITALS
SYSTOLIC BLOOD PRESSURE: 110 MMHG | HEIGHT: 62 IN | WEIGHT: 141 LBS | HEART RATE: 65 BPM | BODY MASS INDEX: 25.95 KG/M2 | DIASTOLIC BLOOD PRESSURE: 60 MMHG | OXYGEN SATURATION: 97 %

## 2025-06-30 DIAGNOSIS — I25.10 ARTERIOSCLEROTIC CORONARY ARTERY DISEASE: ICD-10-CM

## 2025-06-30 DIAGNOSIS — Z95.5 STATUS POST INSERTION OF DRUG-ELUTING STENT INTO LEFT ANTERIOR DESCENDING (LAD) ARTERY: ICD-10-CM

## 2025-06-30 DIAGNOSIS — I25.110 CORONARY ARTERY DISEASE INVOLVING NATIVE CORONARY ARTERY OF NATIVE HEART WITH UNSTABLE ANGINA PECTORIS: ICD-10-CM

## 2025-06-30 DIAGNOSIS — E78.00 HIGH CHOLESTEROL: ICD-10-CM

## 2025-06-30 DIAGNOSIS — I10 BENIGN ESSENTIAL HYPERTENSION: ICD-10-CM

## 2025-06-30 PROCEDURE — 3046F HEMOGLOBIN A1C LEVEL >9.0%: CPT | Performed by: INTERNAL MEDICINE

## 2025-06-30 PROCEDURE — 3074F SYST BP LT 130 MM HG: CPT | Performed by: INTERNAL MEDICINE

## 2025-06-30 PROCEDURE — 99212 OFFICE O/P EST SF 10 MIN: CPT | Performed by: INTERNAL MEDICINE

## 2025-06-30 PROCEDURE — 99213 OFFICE O/P EST LOW 20 MIN: CPT | Performed by: INTERNAL MEDICINE

## 2025-06-30 PROCEDURE — 1036F TOBACCO NON-USER: CPT | Performed by: INTERNAL MEDICINE

## 2025-06-30 PROCEDURE — 3078F DIAST BP <80 MM HG: CPT | Performed by: INTERNAL MEDICINE

## 2025-06-30 PROCEDURE — 1159F MED LIST DOCD IN RCRD: CPT | Performed by: INTERNAL MEDICINE

## 2025-06-30 PROCEDURE — 4010F ACE/ARB THERAPY RXD/TAKEN: CPT | Performed by: INTERNAL MEDICINE

## 2025-06-30 PROCEDURE — 3008F BODY MASS INDEX DOCD: CPT | Performed by: INTERNAL MEDICINE

## 2025-06-30 RX ORDER — ATENOLOL 25 MG/1
25 TABLET ORAL DAILY
Qty: 90 TABLET | Refills: 1 | Status: SHIPPED | OUTPATIENT
Start: 2025-06-30 | End: 2025-12-27

## 2025-06-30 RX ORDER — ATORVASTATIN CALCIUM 40 MG/1
40 TABLET, FILM COATED ORAL DAILY
Qty: 90 TABLET | Refills: 3 | Status: SHIPPED | OUTPATIENT
Start: 2025-06-30 | End: 2026-06-30

## 2025-06-30 NOTE — PROGRESS NOTES
Counseling:  The patient was counseled regarding diagnostic results, instructions for management, risk factor reductions, prognosis, patient and family education, impressions, risks and benefits of treatment options and importance of compliance with treatment.      Chief Complaint:  The patient presents today for 3-month followup of CAD, HTN and hyperlipidemia.The patient is complaining of dizziness. She is taking Atenolol, atorvastatin, Plavix and Zetia with isosorbide and lisinopril with spironolactone. She denies any CP, chest discomfort or SOB. BP has been stable at home but on the lower side. Labs drawn 2024 revealed stable liver and kidney function, and lipids with an LDL of  and  triglycerides of . The patient is compliant with his prescribed medications.       History Of Present Illness:    Jeanine Ghosh is a 65 y.o. female patient who presents today for 3-month followup of CAD, HTN and hyperlipidemia. Her PMH is significant for CAD s/p CABG x2 in 1997 and s/p atherectomy and IVUS guided PCI of RCA with one YASMANY 03/05/2025, hyperlipidemia, Hashimoto's thyroiditis, DM type 1, CKD stage 3, anxiety, fibromyalgia, and reactive airways disease.     Past Surgical History:  She has a past surgical history that includes Other surgical history (06/15/2022); Other surgical history (08/26/2019); Other surgical history (08/26/2019); Other surgical history (08/26/2019); Other surgical history (08/26/2019); Coronary artery bypass graft (03/13/2018); Other surgical history (07/07/2022); Cardiac catheterization (N/A, 2/14/2025); Cardiac catheterization (N/A, 3/5/2025); Cardiac catheterization (N/A, 3/5/2025); Cardiac catheterization (N/A, 3/5/2025); Cardiac catheterization (N/A, 3/5/2025); Coronary stent placement (3/2015); and Coronary angioplasty (1997/2025).      Social History:  She reports that she has never smoked. She has never been exposed to tobacco smoke. She has never used smokeless tobacco. She reports that  she does not currently use alcohol. She reports that she does not currently use drugs.    Family History:  Family History   Problem Relation Name Age of Onset    COPD Brother      Hypertension Mother Shikha     Asthma Sister Stephanie Zaidi     Atrial fibrillation Sister Stpehanie Zaidi     Heart disease Brother Kelvin santiago     Hypertension Mother Shikha         Allergies:  Animal dander and Morphine    Outpatient Medications:  Current Outpatient Medications   Medication Instructions    aspirin 81 mg, oral, Daily    atenolol (TENORMIN) 50 mg, oral, Daily, as directed    atorvastatin (LIPITOR) 80 mg, oral, Daily    blood-glucose sensor (FreeStyle Mando 2 Plus Sensor) device For continuous glucose monitoring.  Cool every 15 days    clopidogrel (PLAVIX) 75 mg, oral, Daily    Dexcom G7 Sensor device Replace every 10 days. Use with Omnipod 5    DULoxetine (CYMBALTA) 20 mg, oral, 2 times daily, Do not crush or chew.    ezetimibe (ZETIA) 10 mg, oral, Daily    flash glucose sensor kit (FreeStyle Mando 2 Sensor) kit For continuous glucose monitoring.  Cool every 14 days    insulin aspart (NovoLOG) 100 unit/mL (3 mL) pen Inject 1 unit for every 10 grams of carb.  Max of 50 units per day with sliding scale    insulin aspart (NovoLOG) 100 unit/mL injection Administer up to 200 units via Omnipod every 3 days as directed. Take as directed per insulin instructions.    insulin glargine-yfgn (SEMGLEE(INSULIN GLARG-YFGN)PEN) 16 Units, subcutaneous, Every morning, Take as directed per insulin instructions.    insulin  cart,aut,G6/7,cntr (Omnipod 5 G6-G7 Intro Kt,Gen5,) cartridge 1 each, subcutaneous, Every 3 days    insulin pump cart,auto,BT,G6/7 (Omnipod 5 G6-G7 Pods, Gen 5,) cartridge 1 each, subcutaneous, Every 3 days    isosorbide mononitrate ER (IMDUR) 30 mg, oral, Daily, Do not crush or chew.    levothyroxine (SYNTHROID, LEVOXYL) 50 mcg, oral, Daily, Take on an empty stomach.    lisinopril 40 mg, oral, Daily    Rowena Regan  140 mg, subcutaneous, Every 14 days    spironolactone (ALDACTONE) 25 mg, Daily        Last Recorded Vitals:  There were no vitals filed for this visit.    Review of Systems   All other systems reviewed and are negative.     Physical Exam:  Constitutional:       Appearance: Healthy appearance. Not in distress.   Neck:      Vascular: No JVR. JVD normal.   Pulmonary:      Effort: Pulmonary effort is normal.      Breath sounds: Normal breath sounds. No wheezing. No rhonchi. No rales.   Chest:      Chest wall: Not tender to palpatation.   Cardiovascular:      PMI at left midclavicular line. Normal rate. Regular rhythm. Normal S1. Normal S2.       Murmurs: There is a grade 2/4 diastolic murmur at the URSB.      No gallop.  No click. No rub.   Pulses:     Intact distal pulses.   Edema:     Peripheral edema absent.   Abdominal:      General: Bowel sounds are normal.      Palpations: Abdomen is soft.      Tenderness: There is no abdominal tenderness.   Musculoskeletal: Normal range of motion.         General: No tenderness. Skin:     General: Skin is warm and dry.   Neurological:      General: No focal deficit present.      Mental Status: Alert and oriented to person, place and time.            Last Labs:  CBC -  Lab Results   Component Value Date    WBC 7.2 03/06/2025    WBC 4.6 02/10/2025    HGB 11.4 (L) 03/06/2025    HGB 13.2 02/10/2025    HCT 34.5 (L) 03/06/2025    HCT 40.5 02/10/2025    MCV 96 03/06/2025    MCV 95.7 02/10/2025     03/06/2025     02/10/2025       CMP -  Lab Results   Component Value Date    CALCIUM 8.7 03/06/2025    CALCIUM 9.0 02/10/2025    PHOS 3.4 03/06/2025    PROT 6.1 02/10/2025    ALBUMIN 3.2 (L) 03/06/2025    ALBUMIN 3.7 02/10/2025    AST 16 02/10/2025    ALT 17 02/10/2025    ALKPHOS 60 02/10/2025    BILITOT 0.5 02/10/2025       LIPID PANEL -   Lab Results   Component Value Date    CHOL 126 05/01/2025    TRIG 75 05/01/2025    HDL 63 05/01/2025    CHHDL 2.0 05/01/2025    LDLF 102 (H)  07/08/2022    VLDL 16 02/05/2024    NHDL 63 05/01/2025       RENAL FUNCTION PANEL -   Lab Results   Component Value Date    GLUCOSE 167 (H) 03/06/2025    GLUCOSE 131 (H) 02/10/2025     03/06/2025     02/10/2025    K 4.3 03/06/2025    K 5.0 02/10/2025     03/06/2025     02/10/2025    CO2 22 03/06/2025    CO2 24 02/10/2025    ANIONGAP 13 03/06/2025    ANIONGAP 8 02/10/2025    BUN 32 (H) 03/06/2025    BUN 40 (H) 02/10/2025    CREATININE 1.35 (H) 03/06/2025    CREATININE 1.38 (H) 02/10/2025    CALCIUM 8.7 03/06/2025    CALCIUM 9.0 02/10/2025    PHOS 3.4 03/06/2025    ALBUMIN 3.2 (L) 03/06/2025    ALBUMIN 3.7 02/10/2025        Lab Results   Component Value Date    HGBA1C 9.7 (A) 06/27/2025       Last Cardiology Tests:  03/05/2025 - Cardiac Catheterization (Staged PCI)  Successful atherectomy and IVUS guided PCI of the RCA with one YASMANY.    02/14/2025 - Cardiac Catheterization (LH)  1. Single vessel coronary artery disease without proximal left anterior descending involvement.  2. Severe sequential calcific stenosis of mid to distal RCA.  3. Staged PCI using atherectomy of the RCA in the following weeks.  4. Aggressive secodary prevention.    08/06/2024 - TTE  1. The left ventricular systolic function is normal, with a León's biplane calculated ejection fraction of 59%.  2. There is normal right ventricular global systolic function.    07/24/2024 - Stress Test  1. Normal stress myocardial perfusion imaging in response to pharmacologic stress. Mild attenuation of the anterolateral segments seen on the supine images that improves with prone imaging most likely soft tissue attenuation. Well-maintained left ventricular function.   2. Abnormal due to chest pain and borderline EKG abnormalities.     09/13/2019 - Stress Test  Normal exercise stress myocardial perfusion imaging.    Lab review: I have personally reviewed the laboratory result(s).     Assessment/Plan   1) CAD s/p Remote CABG x2 in 1997,  s/p Atherectomy and IVUS Guided PCI of RCA with One YASMANY 03/05/2025   On ASA 81 mg daily, Plavix 75 mg daily, atenolol 25 mg daily, atorvastatin 80 mg daily, Zetia 10 mg daily, lisinopril 40 mg daily, Imdur 30 mg daily.  Stress 09/2019 negative for ischemia  2/4 diastolic murmur RUSB   Stress 07/24/2024 negative for ischemia  TTE 08/06/2024 with LVEF 59%, trace-mild aortic regurgitation   [facility] 02/14/2025 with single vessel CAD with severe sequential calcific stenosis of mid to distal RCA  S/P atherectomy and IVUS guided PCI of RCA with one YASMANY 03/05/2025    2) HTN  Stable  On atenolol 50 mg daily, lisinopril 40 mg daily, Imdur 30 mg daily  Decrease Atenolol to 25 mg, cut tab in half until prescription lasts and a  prescription for the new dose has been sent to your pharmacy.     3) Hyperlipidemia  Goal LDL <70  On atorvastatin 80 mg daily, Zetia 10 mg daily   Reports myalgias with atorvastatin   Rx for Repatha sent to  Pharmacy for insurance approval - denied by insurance   Lipid panel 05/01/2025 with LDL of 47  Wishes to decrease dose of Atorvastatin  Decrease it to 40 mg.     Scribe Attestation  By signing my name below, I, Jayleen Martin attest that this documentation has been prepared under the direction and in the presence of Surya Brewer MD. All medical record entries made by the Scribe were at my direction or personally dictated by me. I have reviewed the chart and agree that the record accurately reflects my personal performance of the history, physical exam, discussion and plan.

## 2025-06-30 NOTE — PATIENT INSTRUCTIONS
Decrease atenolol from 50 mg to 25 mg and Atorvastatin to 40 mg. Cut tab in half until prescription lasts and a  prescription for the new dose has been sent to your pharmacy.   Continue all other current medications as prescribed.  Please have repeat blood work drawn in 6 months.  Follow up with Dr. Brewer in 6 months.     If you have any questions or cardiac concerns, please call our office at [phone].

## 2025-07-01 ENCOUNTER — TELEPHONE (OUTPATIENT)
Dept: PRIMARY CARE | Facility: CLINIC | Age: 65
End: 2025-07-01
Payer: MEDICARE

## 2025-07-01 DIAGNOSIS — R80.9 TYPE 1 DIABETES MELLITUS WITH MICROALBUMINURIA, WITH LONG-TERM CURRENT USE OF INSULIN (MULTI): Primary | ICD-10-CM

## 2025-07-01 DIAGNOSIS — E10.29 TYPE 1 DIABETES MELLITUS WITH MICROALBUMINURIA, WITH LONG-TERM CURRENT USE OF INSULIN (MULTI): Primary | ICD-10-CM

## 2025-07-01 RX ORDER — INSULIN GLARGINE-YFGN 100 [IU]/ML
16 INJECTION, SOLUTION SUBCUTANEOUS EVERY MORNING
Qty: 10 ML | Refills: 5 | Status: SHIPPED | OUTPATIENT
Start: 2025-07-01 | End: 2025-12-28

## 2025-07-01 RX ORDER — INSULIN GLARGINE 100 [IU]/ML
INJECTION, SOLUTION SUBCUTANEOUS EVERY 24 HOURS
COMMUNITY

## 2025-07-01 RX ORDER — INSULIN GLARGINE-YFGN 100 [IU]/ML
INJECTION, SOLUTION SUBCUTANEOUS EVERY 24 HOURS
COMMUNITY
End: 2025-07-01 | Stop reason: SDUPTHER

## 2025-07-01 RX ORDER — INSULIN GLARGINE-YFGN 100 [IU]/ML
16 INJECTION, SOLUTION SUBCUTANEOUS EVERY 24 HOURS
Qty: 10 ML | Refills: 5 | Status: SHIPPED | OUTPATIENT
Start: 2025-07-01 | End: 2025-12-28

## 2025-07-01 NOTE — TELEPHONE ENCOUNTER
(Next appt 12/8/2025)    Patient [facility], patient state semglee pens is on back order.

## 2025-07-01 NOTE — TELEPHONE ENCOUNTER
Jeanine would like semglee vials sent to [facility] pharmacy.    Next appointment 7/7/25

## 2025-07-03 PROCEDURE — RXMED WILLOW AMBULATORY MEDICATION CHARGE

## 2025-07-04 NOTE — ASSESSMENT & PLAN NOTE
To increase Semglee to 16 units subcutaneous daily in the morning   To continue Insulin Aspart 1 unit for every 10 grams of carb  Please continue an insulin sliding scale as follows:   150-200mg/dL - 2 units   201-250mg/dL - 4 units   251-300 mg/dL - 6 untis   301-350mg/dL - 8 units   >351mg/dL - 10 units  Record all doses of insulin in the FreeStyle Mando lori  To continue the use of your CGM for the intensive glucose monitoring due to the dynamic nature of insulin requirements, the narrow therapeutic index of insulin and the potentially fatal consequences of treatment  Counseled that the time in range should be above 70%  Counseled that the goal A1C should be 7% or less  Counseled glycemic control is warranted to prevent microvascular complications  Will check cost assistance for an Omnipod insulin pump   Dietician referral

## 2025-07-07 ENCOUNTER — APPOINTMENT (OUTPATIENT)
Dept: ENDOCRINOLOGY | Facility: CLINIC | Age: 65
End: 2025-07-07
Payer: MEDICARE

## 2025-07-07 ENCOUNTER — PHARMACY VISIT (OUTPATIENT)
Dept: PHARMACY | Facility: CLINIC | Age: 65
End: 2025-07-07
Payer: COMMERCIAL

## 2025-07-07 DIAGNOSIS — J45.20 MILD INTERMITTENT REACTIVE AIRWAY DISEASE WITHOUT COMPLICATION (HHS-HCC): Primary | ICD-10-CM

## 2025-07-07 DIAGNOSIS — E10.29 TYPE 1 DIABETES MELLITUS WITH MICROALBUMINURIA, WITH LONG-TERM CURRENT USE OF INSULIN (MULTI): Primary | ICD-10-CM

## 2025-07-07 DIAGNOSIS — R80.9 TYPE 1 DIABETES MELLITUS WITH MICROALBUMINURIA, WITH LONG-TERM CURRENT USE OF INSULIN (MULTI): Primary | ICD-10-CM

## 2025-07-07 PROCEDURE — RXMED WILLOW AMBULATORY MEDICATION CHARGE

## 2025-07-07 RX ORDER — ALBUTEROL SULFATE 90 UG/1
2 INHALANT RESPIRATORY (INHALATION) EVERY 4 HOURS PRN
Qty: 6.7 G | Refills: 2 | Status: SHIPPED | OUTPATIENT
Start: 2025-07-07 | End: 2026-07-07

## 2025-07-07 RX ORDER — BLOOD-GLUCOSE SENSOR
EACH MISCELLANEOUS
Qty: 9 EACH | Refills: 3 | Status: SHIPPED | OUTPATIENT
Start: 2025-07-07

## 2025-07-07 NOTE — PROGRESS NOTES
Clinical Pharmacy Team contacted Jeanine Ghosh regarding a consultation for diabetes management thanks to Dr. Bagley Below is a summary of our conversation and recommendations:    Recommendations:  Start Omnipod 5 with Novolog   Enrolled in  PAP  Omnipod and Novolog shipped  Transition to Dexcom G7 for compatibility with [employer] Omnipod lori  Rx sent to local pharmacy  Follow-up in 2 weeks in-person for Omnipod training  ________________________________________________________________________    Subjective:     Has wanted Omnipod for a long time. Nephew uses and loves it. Helped to get his A1c under control.  Comfortable carb counting.  Just got FSL2, needs to  Dexcom G7. Can provide sample at training if needed.  Goes low overnight. Does not feel confident in dosing insulin when sugar is low or coming down  Will download Omnipod and Dexxom G7 apps before training. Denia will call to set up accounts next week.       Allergies[1]    Adverse Effects:   N/a    Objective     There were no vitals taken for this visit.    Diabetes Pharmacotherapy:    Semglee, Novolog    Semglee: 16 units in the morning  Novolog/aspart: 1: 10 carb. Correction factor 2:50 above 150 mg/dL  - 8 units most commonly  - Biggest: 10 units  - Total daily Novolo units  - 3 meals    Total daily ~50 units      Lab Review  Lab Results   Component Value Date    BILITOT 0.5 02/10/2025    CALCIUM 8.7 2025    CO2 22 2025     2025    CREATININE 1.35 (H) 2025    GLUCOSE 167 (H) 2025    ALKPHOS 60 02/10/2025    K 4.3 2025    PROT 6.1 02/10/2025     2025    AST 16 02/10/2025    ALT 17 02/10/2025    BUN 32 (H) 2025    ANIONGAP 13 2025    PHOS 3.4 2025    ALBUMIN 3.2 (L) 2025    GFRF 48 (A) 02/10/2023     Lab Results   Component Value Date    TRIG 75 2025    CHOL 126 2025    LDLCALC 47 2025    HDL 63 2025     Lab Results   Component Value Date     HGBA1C 9.7 (A) 2025    HGBA1C 9.1 (H) 02/10/2025    HGBA1C 8.6 (A) 2024     The ASCVD Risk score (Kevin VOSS, et al., 2019) failed to calculate for the following reasons:    The valid total cholesterol range is 130 to 320 mg/dL    Unable to determine if patient is Non-       Monitoring           Assessment/Plan     The patient reports today for a diabetes consultation. Discussed initiation of Omnipod pump to improve consistency of blood sugar levels. Patient agreeable to transitioning from MDI to automated insulin delivery. Transitioning to Dexcom G7 for iPhone compatibility. Will enroll in  PAP to alleviate cost of therapy. Follow-up for Omnipod training in 2 weeks.     Patient Assistance Screening (VAF)  Patient verbally reports monthly or yearly income which is less than 400% federal poverty level  Application for program has been submitted for the following medications:   Omnipod, Novolog  Patient aware this process may take up to 2 weeks once income documents have been sent to the team.  If approved, medication must be filled through [facility] pharmacy and may be picked up or mailed to patient.   If approved, medication will be billed through insurance, and patient assistance team will pay the copay. This will result in a $0 copay for the patient.  Counseled patient on mechanism of action, side effects, contraindications, and what to do if the patient misses a dose. All patients questions were answered.          PATIENT EDUCATION/GOALS  Current A1c: [  9.7% ] [ insert date taken ]     Goals  Fasting B - 130 mg/dL  Postprandial BG: less than 180 mg/dL  A1c: less than 7%    Type of encounter: [ /virtual ]    Provided counseling on lifestyle modifications, medications, and self-monitoring. Patient has no additional questions at this time. Pharmacy to follow up in 2 weeks. Please reach out with any questions. Thank you.       Tika Wilkins, PharmD    Provider on site:  Dr Bagley  Continue all meds under the continuation of care with the referring provider and clinical pharmacy team.          [1]   Allergies  Allergen Reactions    Animal Dander Shortness of breath     especially cats    Morphine Itching and Swelling

## 2025-07-07 NOTE — PATIENT INSTRUCTIONS
Download Omnipod 5 lori and Dexcom G7 lori  Day of training, skip Semglee  Bring: Omnipod intro kit, Novolog vial, Dexcom G7 sensors (if you have them)    Tika Phone: [phone]

## 2025-07-08 ENCOUNTER — PHARMACY VISIT (OUTPATIENT)
Dept: PHARMACY | Facility: CLINIC | Age: 65
End: 2025-07-08
Payer: COMMERCIAL

## 2025-07-21 ENCOUNTER — APPOINTMENT (OUTPATIENT)
Dept: ENDOCRINOLOGY | Facility: CLINIC | Age: 65
End: 2025-07-21
Payer: MEDICARE

## 2025-07-21 DIAGNOSIS — R80.9 TYPE 1 DIABETES MELLITUS WITH MICROALBUMINURIA, WITH LONG-TERM CURRENT USE OF INSULIN (MULTI): Primary | ICD-10-CM

## 2025-07-21 DIAGNOSIS — E10.29 TYPE 1 DIABETES MELLITUS WITH MICROALBUMINURIA, WITH LONG-TERM CURRENT USE OF INSULIN (MULTI): Primary | ICD-10-CM

## 2025-07-21 NOTE — PROGRESS NOTES
Patient Assistance for Omnipod, Novolog approved through 7/3/26. Will have to be renewed prior to that date to prevent lapse in coverage. Medication(s) will be received at no cost to patient from [facility].     Clinical Pharmacy Team contacted Jeanine Ghosh regarding a consultation for diabetes management thanks to Dr. Bagley Below is a summary of our conversation and recommendations:    Recommendations:  Start Omnipod 5 with Novolog   Trained on Omnipod system today  Transition to Dexcom G7 for compatibility with Plair Omnipod lori  Trained on Dexcom G7 system today  Follow-up in 2 weeks for Omnipod adjustment  ________________________________________________________________________      Subjective:     Has wanted Omnipod for a long time. Nephew uses and loves it. Helped to get his A1c under control.  Comfortable carb counting.  Just got FSL2, needs to  Dexcom G7. Can provide sample at training if needed.  Goes low overnight. Does not feel confident in dosing insulin when sugar is low or coming down  Will download Omnipod and Dexxom G7 apps before training. Denia will call to set up accounts next week.       Allergies[1]    Adverse Effects:   N/a    Objective     There were no vitals taken for this visit.    Diabetes Pharmacotherapy:    Semglee, Novolog    Semglee: 16 units in the morning  Novolog/aspart: 1: 10 carb. Correction factor 2:50 above 150 mg/dL  - 8 units most commonly  - Biggest: 10 units  - Total daily Novolo units  - 3 meals      Lab Review  Lab Results   Component Value Date    BILITOT 0.5 02/10/2025    CALCIUM 8.7 2025    CO2 22 2025     2025    CREATININE 1.35 (H) 2025    GLUCOSE 167 (H) 2025    ALKPHOS 60 02/10/2025    K 4.3 2025    PROT 6.1 02/10/2025     2025    AST 16 02/10/2025    ALT 17 02/10/2025    BUN 32 (H) 2025    ANIONGAP 13 2025    PHOS 3.4 2025    ALBUMIN 3.2 (L) 2025    GFRF 48  (A) 02/10/2023     Lab Results   Component Value Date    TRIG 75 2025    CHOL 126 2025    LDLCALC 47 2025    HDL 63 2025     Lab Results   Component Value Date    HGBA1C 9.7 (A) 2025    HGBA1C 9.1 (H) 02/10/2025    HGBA1C 8.6 (A) 2024     The ASCVD Risk score (Kevin DK, et al., 2019) failed to calculate for the following reasons:    The valid total cholesterol range is 130 to 320 mg/dL    Unable to determine if patient is Non-       Monitoring           Assessment/Plan     The patient reports today for a diabetes consultation. Transitioned to Dexcom G7 for iPhone compatibility. Enrolled in  PAP to alleviate cost of therapy. Trained on Omnipod 5 system today. Answered all patient questions before leaving today.  Follow-up for Omnipod adjustment in 2 weeks.         PATIENT EDUCATION/GOALS  Current A1c: [  9.7% ] [ insert date taken ]     Goals  Fasting B - 130 mg/dL  Postprandial BG: less than 180 mg/dL  A1c: less than 7%    Type of encounter: [ in person ]    Provided counseling on lifestyle modifications, medications, and self-monitoring. Patient has no additional questions at this time. Pharmacy to follow up in 2 weeks. Please reach out with any questions. Thank you.       Tika Wilkins, PharmD    Provider on site: none  Continue all meds under the continuation of care with the referring provider and clinical pharmacy team.          [1]   Allergies  Allergen Reactions    Animal Dander Shortness of breath     especially cats    Morphine Itching and Swelling

## 2025-07-25 ENCOUNTER — PATIENT MESSAGE (OUTPATIENT)
Dept: PRIMARY CARE | Facility: CLINIC | Age: 65
End: 2025-07-25
Payer: MEDICARE

## 2025-07-30 ENCOUNTER — TELEPHONE (OUTPATIENT)
Dept: PHARMACY | Facility: HOSPITAL | Age: 65
End: 2025-07-30
Payer: MEDICARE

## 2025-07-30 NOTE — TELEPHONE ENCOUNTER
"Patient called with concerns that pump is not giving her enough insulin. Continues to spike post-prandially      Intensified pump settings as follows.  Carb ratio from 1:12 --> 1:11  Correction factor from 1:50--1:45  Turn OFF reverse correction  Lowered glucose target and \"correct above\" from to 110mg/dL    Walked patient through implementing these changes in Omnipod lori. Will call me with any further questions or concerns    Tika Wilkins, PharmD    "

## 2025-08-04 ENCOUNTER — APPOINTMENT (OUTPATIENT)
Dept: ENDOCRINOLOGY | Facility: CLINIC | Age: 65
End: 2025-08-04
Payer: MEDICARE

## 2025-08-04 DIAGNOSIS — R80.9 TYPE 1 DIABETES MELLITUS WITH MICROALBUMINURIA, WITH LONG-TERM CURRENT USE OF INSULIN (MULTI): ICD-10-CM

## 2025-08-04 DIAGNOSIS — E10.29 TYPE 1 DIABETES MELLITUS WITH MICROALBUMINURIA, WITH LONG-TERM CURRENT USE OF INSULIN (MULTI): ICD-10-CM

## 2025-08-04 NOTE — PROGRESS NOTES
Patient Assistance for Omnipod, Novolog approved through 7/3/26. Will have to be renewed prior to that date to prevent lapse in coverage. Medication(s) will be received at no cost to patient from [facility].     Clinical Pharmacy Team contacted Jeanine Ghosh regarding a consultation for diabetes management thanks to Dr. Bagley Below is a summary of our conversation and recommendations:    Recommendations:  Continue Omnipod 5 with Novolog   Intensified correction factor from 1:45 to 1:40 mg/dL  Follow-up in 2 weeks for Omnipod adjustment   ________________________________________________________________________    Subjective:     Has wanted Omnipod for a long time. Nephew uses and loves it. Helped to get his A1c under control.  Comfortable carb counting.  Enjoying Omnipod so far. Likes not having to give herself shots, but notes that sugar is not always controlled with the Omnipod. Sometimes rises when she is not doing anything. Was hoping Omnipod would control these random fluctuations better.    Allergies[1]    Adverse Effects:   N/a    Objective     There were no vitals taken for this visit.    Diabetes Pharmacotherapy:    Omnipod 5 with Novolog      Lab Review  Lab Results   Component Value Date    BILITOT 0.5 02/10/2025    CALCIUM 8.7 03/06/2025    CO2 22 03/06/2025     03/06/2025    CREATININE 1.35 (H) 03/06/2025    GLUCOSE 167 (H) 03/06/2025    ALKPHOS 60 02/10/2025    K 4.3 03/06/2025    PROT 6.1 02/10/2025     03/06/2025    AST 16 02/10/2025    ALT 17 02/10/2025    BUN 32 (H) 03/06/2025    ANIONGAP 13 03/06/2025    PHOS 3.4 03/06/2025    ALBUMIN 3.2 (L) 03/06/2025    GFRF 48 (A) 02/10/2023     Lab Results   Component Value Date    TRIG 75 05/01/2025    CHOL 126 05/01/2025    LDLCALC 47 05/01/2025    HDL 63 05/01/2025     Lab Results   Component Value Date    HGBA1C 9.7 (A) 06/27/2025    HGBA1C 9.1 (H) 02/10/2025    HGBA1C 8.6 (A) 06/14/2024     The ASCVD Risk score (Kevin DK, et  al., 2019) failed to calculate for the following reasons:    The valid total cholesterol range is 130 to 320 mg/dL    Unable to determine if patient is Non-       Monitoring           Assessment/Plan     The patient reports today for a diabetes consultation. Started Omnipod 5 2 weeks ago and is still getting ready to system. Have made several adjustments over the phone in the last week. Reviewed changing out sensor over the phone today. Intensify correction doses and advised to give more corrections. Sensitivity factor from 1:45-->1:40.        PATIENT EDUCATION/GOALS  Current A1c: [  9.7% ] [ 25]     Goals  Fasting B - 130 mg/dL  Postprandial BG: less than 180 mg/dL  A1c: less than 7%    Type of encounter: [ virtual ]    Provided counseling on lifestyle modifications, medications, and self-monitoring. Patient has no additional questions at this time. Pharmacy to follow up in 2 weeks. Please reach out with any questions. Thank you.       Tika Wilkins, PharmD    Provider on site: none  Continue all meds under the continuation of care with the referring provider and clinical pharmacy team.          [1]   Allergies  Allergen Reactions    Animal Dander Shortness of breath     especially cats    Morphine Itching and Swelling

## 2025-08-07 ENCOUNTER — APPOINTMENT (OUTPATIENT)
Dept: CARDIOLOGY | Facility: HOSPITAL | Age: 65
End: 2025-08-07
Payer: COMMERCIAL

## 2025-08-18 ENCOUNTER — APPOINTMENT (OUTPATIENT)
Dept: ENDOCRINOLOGY | Facility: CLINIC | Age: 65
End: 2025-08-18
Payer: MEDICARE

## 2025-08-18 DIAGNOSIS — R80.9 TYPE 1 DIABETES MELLITUS WITH MICROALBUMINURIA, WITH LONG-TERM CURRENT USE OF INSULIN (MULTI): ICD-10-CM

## 2025-08-18 DIAGNOSIS — E10.29 TYPE 1 DIABETES MELLITUS WITH MICROALBUMINURIA, WITH LONG-TERM CURRENT USE OF INSULIN (MULTI): ICD-10-CM

## 2025-10-13 ENCOUNTER — APPOINTMENT (OUTPATIENT)
Dept: ENDOCRINOLOGY | Facility: CLINIC | Age: 65
End: 2025-10-13
Payer: MEDICARE

## 2025-12-08 ENCOUNTER — APPOINTMENT (OUTPATIENT)
Dept: ENDOCRINOLOGY | Facility: CLINIC | Age: 65
End: 2025-12-08
Payer: MEDICARE

## 2026-02-12 ENCOUNTER — APPOINTMENT (OUTPATIENT)
Dept: PRIMARY CARE | Facility: CLINIC | Age: 66
End: 2026-02-12
Payer: COMMERCIAL

## (undated) DEVICE — SHEATH, PINNACLE, 10 CM,  6FR INTRODUCER, 6FR DIA, 2.5 CM DIALATOR

## (undated) DEVICE — GUIDEWIRE, INQWIRE, 3MM J, .035 X 210CM, FIXED

## (undated) DEVICE — CATHETER, BALLOON, MONORAIL, NC EMERGE, PTCA, 20MM X 3.50MM

## (undated) DEVICE — CATHETER, DIAGNOSTIC, DXTERITY, 6FR JR 4.0, 100CM

## (undated) DEVICE — CATHETER, GUIDING, LAUNCHER, 6FR AL 75

## (undated) DEVICE — SHEATH, GLIDESHEATH, SLENDER, 6FR 10CM

## (undated) DEVICE — INTRODUCER, FAST-CATH, 8 FR X 12 CM, W/LUER-LOCK

## (undated) DEVICE — BAND, VASCULAR, RADIAL HEMOSTAT, REGULAR 24CM

## (undated) DEVICE — CATHETER, BALLOON, NC EUPHORA NONCOMPLIANT 2.5 X 15 X 142CM

## (undated) DEVICE — GUIDEWIRE, STRAIGHT, HI-TORQUE BALANCE MIDDLEWEIGHT, 0.014 IN X 300 CM, HYDROCOAT

## (undated) DEVICE — GUIDEWIRE, HI-TORQUE PILOT 200, .014IN X 300CM, STRAIGHT

## (undated) DEVICE — VALVE, HEMO, GUARDIAN II, W/GUIDEWIRE INSERTION TOOL & TORQUE

## (undated) DEVICE — CATHETER, DIAGNOSTIC, DXTERITY, 6 FR, JL 4.0, 100C

## (undated) DEVICE — GUIDEWIRE, ROTAWIRE DRIVE, FLOPPY

## (undated) DEVICE — CATHETER, EAGLE EYE, PLATNIUM (IVUS)

## (undated) DEVICE — ACCESS KIT, S-MAK MINI, 4FR 10CM 0.018IN 40CM, NT/PT, ECHO ENHANCE NEEDLE

## (undated) DEVICE — CABLE, PACING PATIENT BIPOLAR 8'

## (undated) DEVICE — TR BAND, RADIAL COMPRESSION, STANDARD, 24CM

## (undated) DEVICE — DEVICE, ROTAPRO 2.0, EXCHANGEABLE BURR, 1.5MM X 135CM

## (undated) DEVICE — GUIDEWIRE, INQUIRE, J TIP, .035 X 210CM, FIXED CORE, DIAGNOSTIC

## (undated) DEVICE — CLOSURE DEVICE, VASCULAR, ANGIO-SEAL, VIP, 6FR, LF

## (undated) DEVICE — DEVICE, INFLATION, ENCORE 20

## (undated) DEVICE — PAD, ELECTRODE DEFIB PADPRO ADULT STRL W/ADAPTER

## (undated) DEVICE — Device